# Patient Record
Sex: FEMALE | Race: WHITE | NOT HISPANIC OR LATINO | Employment: FULL TIME | URBAN - METROPOLITAN AREA
[De-identification: names, ages, dates, MRNs, and addresses within clinical notes are randomized per-mention and may not be internally consistent; named-entity substitution may affect disease eponyms.]

---

## 2017-02-13 ENCOUNTER — ALLSCRIPTS OFFICE VISIT (OUTPATIENT)
Dept: OTHER | Facility: OTHER | Age: 52
End: 2017-02-13

## 2017-02-13 ENCOUNTER — LAB REQUISITION (OUTPATIENT)
Dept: LAB | Facility: HOSPITAL | Age: 52
End: 2017-02-13
Payer: COMMERCIAL

## 2017-02-13 DIAGNOSIS — N63.0 BREAST LUMP: ICD-10-CM

## 2017-02-13 DIAGNOSIS — Z11.51 ENCOUNTER FOR SCREENING FOR HUMAN PAPILLOMAVIRUS (HPV): ICD-10-CM

## 2017-02-13 DIAGNOSIS — Z01.419 ENCOUNTER FOR GYNECOLOGICAL EXAMINATION WITHOUT ABNORMAL FINDING: ICD-10-CM

## 2017-02-13 PROCEDURE — 87624 HPV HI-RISK TYP POOLED RSLT: CPT | Performed by: NURSE PRACTITIONER

## 2017-02-13 PROCEDURE — G0145 SCR C/V CYTO,THINLAYER,RESCR: HCPCS | Performed by: NURSE PRACTITIONER

## 2017-02-17 LAB — HPV RRNA GENITAL QL NAA+PROBE: NORMAL

## 2017-02-20 LAB
LAB AP GYN PRIMARY INTERPRETATION: NORMAL
Lab: NORMAL

## 2017-02-22 ENCOUNTER — HOSPITAL ENCOUNTER (OUTPATIENT)
Dept: ULTRASOUND IMAGING | Facility: CLINIC | Age: 52
Discharge: HOME | End: 2017-02-22
Payer: COMMERCIAL

## 2017-02-22 ENCOUNTER — HOSPITAL ENCOUNTER (OUTPATIENT)
Dept: MAMMOGRAPHY | Facility: CLINIC | Age: 52
Discharge: HOME | End: 2017-02-22
Payer: COMMERCIAL

## 2017-02-22 DIAGNOSIS — N63.0 BREAST LUMP: ICD-10-CM

## 2017-02-22 PROCEDURE — G0204 DX MAMMO INCL CAD BI: HCPCS

## 2017-02-22 PROCEDURE — 76642 ULTRASOUND BREAST LIMITED: CPT

## 2017-02-24 ENCOUNTER — GENERIC CONVERSION - ENCOUNTER (OUTPATIENT)
Dept: OTHER | Facility: OTHER | Age: 52
End: 2017-02-24

## 2017-04-19 ENCOUNTER — TRANSCRIBE ORDERS (OUTPATIENT)
Dept: ADMINISTRATIVE | Age: 52
End: 2017-04-19

## 2017-04-19 ENCOUNTER — APPOINTMENT (OUTPATIENT)
Dept: LAB | Age: 52
End: 2017-04-19
Payer: COMMERCIAL

## 2017-04-19 DIAGNOSIS — D64.9 ANEMIA: ICD-10-CM

## 2017-04-19 DIAGNOSIS — E03.9 HYPOTHYROIDISM: ICD-10-CM

## 2017-04-19 LAB
BASOPHILS # BLD AUTO: 0.04 THOUSANDS/ΜL (ref 0–0.1)
BASOPHILS NFR BLD AUTO: 1 % (ref 0–1)
EOSINOPHIL # BLD AUTO: 0.24 THOUSAND/ΜL (ref 0–0.61)
EOSINOPHIL NFR BLD AUTO: 3 % (ref 0–6)
ERYTHROCYTE [DISTWIDTH] IN BLOOD BY AUTOMATED COUNT: 13 % (ref 11.6–15.1)
HCT VFR BLD AUTO: 38.4 % (ref 34.8–46.1)
HGB BLD-MCNC: 12.3 G/DL (ref 11.5–15.4)
IRON SATN MFR SERPL: 15 %
IRON SERPL-MCNC: 56 UG/DL (ref 50–170)
LYMPHOCYTES # BLD AUTO: 2.94 THOUSANDS/ΜL (ref 0.6–4.47)
LYMPHOCYTES NFR BLD AUTO: 36 % (ref 14–44)
MCH RBC QN AUTO: 28.9 PG (ref 26.8–34.3)
MCHC RBC AUTO-ENTMCNC: 32 G/DL (ref 31.4–37.4)
MCV RBC AUTO: 90 FL (ref 82–98)
MONOCYTES # BLD AUTO: 0.48 THOUSAND/ΜL (ref 0.17–1.22)
MONOCYTES NFR BLD AUTO: 6 % (ref 4–12)
NEUTROPHILS # BLD AUTO: 4.42 THOUSANDS/ΜL (ref 1.85–7.62)
NEUTS SEG NFR BLD AUTO: 54 % (ref 43–75)
NRBC BLD AUTO-RTO: 0 /100 WBCS
PLATELET # BLD AUTO: 377 THOUSANDS/UL (ref 149–390)
PMV BLD AUTO: 9.7 FL (ref 8.9–12.7)
RBC # BLD AUTO: 4.26 MILLION/UL (ref 3.81–5.12)
TIBC SERPL-MCNC: 378 UG/DL (ref 250–450)
TSH SERPL DL<=0.05 MIU/L-ACNC: 0.09 UIU/ML (ref 0.36–3.74)
WBC # BLD AUTO: 8.14 THOUSAND/UL (ref 4.31–10.16)

## 2017-04-19 PROCEDURE — 85025 COMPLETE CBC W/AUTO DIFF WBC: CPT

## 2017-04-19 PROCEDURE — 83540 ASSAY OF IRON: CPT

## 2017-04-19 PROCEDURE — 83550 IRON BINDING TEST: CPT

## 2017-04-19 PROCEDURE — 84443 ASSAY THYROID STIM HORMONE: CPT

## 2017-04-19 PROCEDURE — 36415 COLL VENOUS BLD VENIPUNCTURE: CPT

## 2017-06-15 ENCOUNTER — ALLSCRIPTS OFFICE VISIT (OUTPATIENT)
Dept: OTHER | Facility: OTHER | Age: 52
End: 2017-06-15

## 2017-06-21 DIAGNOSIS — E03.9 HYPOTHYROIDISM: ICD-10-CM

## 2017-06-30 ENCOUNTER — APPOINTMENT (OUTPATIENT)
Dept: LAB | Facility: CLINIC | Age: 52
End: 2017-06-30
Payer: COMMERCIAL

## 2017-06-30 DIAGNOSIS — E03.9 HYPOTHYROIDISM: ICD-10-CM

## 2017-06-30 LAB — TSH SERPL DL<=0.05 MIU/L-ACNC: 0.05 UIU/ML (ref 0.36–3.74)

## 2017-06-30 PROCEDURE — 36415 COLL VENOUS BLD VENIPUNCTURE: CPT

## 2017-06-30 PROCEDURE — 84443 ASSAY THYROID STIM HORMONE: CPT

## 2017-07-19 ENCOUNTER — HOSPITAL ENCOUNTER (OUTPATIENT)
Dept: RADIOLOGY | Facility: HOSPITAL | Age: 52
Discharge: HOME | End: 2017-07-19
Payer: COMMERCIAL

## 2017-07-19 DIAGNOSIS — E03.9 HYPOTHYROIDISM: ICD-10-CM

## 2017-07-19 PROCEDURE — 76536 US EXAM OF HEAD AND NECK: CPT

## 2017-07-27 ENCOUNTER — GENERIC CONVERSION - ENCOUNTER (OUTPATIENT)
Dept: OTHER | Facility: OTHER | Age: 52
End: 2017-07-27

## 2017-08-08 ENCOUNTER — ALLSCRIPTS OFFICE VISIT (OUTPATIENT)
Dept: OTHER | Facility: OTHER | Age: 52
End: 2017-08-08

## 2017-08-08 DIAGNOSIS — E53.8 DEFICIENCY OF OTHER SPECIFIED B GROUP VITAMINS: ICD-10-CM

## 2017-08-08 DIAGNOSIS — E03.9 HYPOTHYROIDISM: ICD-10-CM

## 2017-08-08 DIAGNOSIS — D64.9 ANEMIA: ICD-10-CM

## 2017-08-16 DIAGNOSIS — E03.9 HYPOTHYROIDISM: ICD-10-CM

## 2017-09-22 ENCOUNTER — APPOINTMENT (OUTPATIENT)
Dept: LAB | Facility: HOSPITAL | Age: 52
End: 2017-09-22
Payer: COMMERCIAL

## 2017-09-22 ENCOUNTER — ALLSCRIPTS OFFICE VISIT (OUTPATIENT)
Dept: OTHER | Facility: OTHER | Age: 52
End: 2017-09-22

## 2017-09-22 DIAGNOSIS — R39.9 UNSPECIFIED SYMPTOMS AND SIGNS INVOLVING THE GENITOURINARY SYSTEM: ICD-10-CM

## 2017-09-22 LAB
BILIRUB UR QL STRIP: NORMAL
CLARITY UR: NORMAL
COLOR UR: YELLOW
GLUCOSE (HISTORICAL): NORMAL
HGB UR QL STRIP.AUTO: NORMAL
KETONES UR STRIP-MCNC: NORMAL MG/DL
LEUKOCYTE ESTERASE UR QL STRIP: NORMAL
NITRITE UR QL STRIP: NORMAL
PH UR STRIP.AUTO: 0.5 [PH]
PROT UR STRIP-MCNC: NORMAL MG/DL
SP GR UR STRIP.AUTO: 1.02
UROBILINOGEN UR QL STRIP.AUTO: 0.2

## 2017-09-22 PROCEDURE — 87086 URINE CULTURE/COLONY COUNT: CPT

## 2017-09-23 LAB — BACTERIA UR CULT: NORMAL

## 2018-01-10 NOTE — MISCELLANEOUS
Provider Comments  Provider Comments:   Pt  was a no show for her Rheumatology evaluation today, 6/15/17        Signatures   Electronically signed by : Joe Silva DO; Jonah 15 2017  3:07PM EST                       (Author)

## 2018-01-13 VITALS
HEIGHT: 64 IN | BODY MASS INDEX: 31.07 KG/M2 | SYSTOLIC BLOOD PRESSURE: 120 MMHG | DIASTOLIC BLOOD PRESSURE: 74 MMHG | WEIGHT: 182 LBS

## 2018-01-13 VITALS
BODY MASS INDEX: 30.43 KG/M2 | TEMPERATURE: 97.4 F | RESPIRATION RATE: 18 BRPM | HEIGHT: 64 IN | WEIGHT: 178.25 LBS | SYSTOLIC BLOOD PRESSURE: 110 MMHG | HEART RATE: 78 BPM | DIASTOLIC BLOOD PRESSURE: 70 MMHG

## 2018-01-14 VITALS
SYSTOLIC BLOOD PRESSURE: 130 MMHG | TEMPERATURE: 96.4 F | WEIGHT: 177.5 LBS | HEIGHT: 64 IN | HEART RATE: 82 BPM | DIASTOLIC BLOOD PRESSURE: 84 MMHG | BODY MASS INDEX: 30.3 KG/M2

## 2018-01-15 NOTE — RESULT NOTES
Verified Results  [address] [place] [address] 02:24PM Sacha Moeller Order Number: [id number]    - Patient Instructions: To schedule this appointment, please contact Central Scheduling at [phone]  Test Name Result Flag Reference   US THYROID (Report)     THYROID ULTRASOUND     INDICATION: Fullness when swallowing, abnormal thyroid function tests     COMPARISON: None  TECHNIQUE:  Ultrasound of the thyroid was performed with a high frequency linear transducer in transverse and sagittal planes including volumetric imaging sweeps as well as traditional still imaging technique  FINDINGS:   The thyroid is atrophic and heterogeneous  Right gland: 0 9 x 0 7 x 2 4 cm  No dominant nodules  Left gland: 0 8 x 0 9 x 3 1 cm  No dominant nodules  Isthmus: The isthmus is 0 1 cm in AP dimension  IMPRESSION:      Atrophic and heterogeneous thyroid likely secondary to chronic autoimmune thyroiditis  Workstation performed: [id number]     Signed by:   Mookie Shelby MD   7/24/17       Signatures   Electronically signed by :  DELFINA Young ; Jul 27 2017  6:35PM EST                       (Author)

## 2018-01-15 NOTE — MISCELLANEOUS
Message   Recorded as Task   Date: 02/24/2017 01:12 PM, Created By: Jordy Torres   Task Name: Result Follow Up   Assigned To: Debby Dunn GYN,Team   Regarding Patient: Que Forrester, Status: In Progress   Zaranatalie Jeffers - 24 Feb 2017 1:12 PM     TASK CREATED  Benign findings on dx mammo and bilat breast US   Please notify patient and advise that she returns for screening mammo in 1 year  F/u PRN for changes noted on SBE  Please also advise that pap was WNL  Thanks   Paty Bañuelos - 24 Feb 2017 1:23 PM     TASK IN PROGRESS   Paty Bañuelos - 24 Feb 2017 1:23 PM     TASK EDITED  lm for pt  re olu and pap results        Active Problems    1  Anemia (285 9) (D64 9)   2  Arthritis (716 90) (M19 90)   3  Attention deficit hyperactivity disorder (314 01) (F90 9)   4  Atypical chest pain (786 59) (R07 89)   5  Cervical cancer screening (V76 2) (Z12 4)   6  Depression with anxiety (300 4) (F41 8)   7  Dysmenorrhea (625 3) (N94 6)   8  Eczema (692 9) (L30 9)   9  Elevated C-reactive protein (790 95) (R79 82)   10  Elevated WBC count (288 60) (D72 829)   11  Encounter for gynecological examination with abnormal finding (V72 31) (Z01 411)   12  Encounter for screening mammogram for malignant neoplasm of breast (V76 12)    (Z12 31)   13  Esophageal reflux (530 81) (K21 9)   14  Fasciitis (729 4) (M72 9)   15  Generalized anxiety disorder (300 02) (F41 1)   16  Herpes simplex infection (054 9) (B00 9)   17  Hypothyroidism (244 9) (E03 9)   18  Intramural leiomyoma of uterus (218 1) (D25 1)   19  Leiomyoma of uterus (218 9) (D25 9)   20  Lymphadenopathy (785 6) (R59 1)   21  Masses of both breasts (611 72) (N63)   22  Multiple joint pain (719 49) (M25 50)   23  Need for influenza vaccination (V04 81) (Z23)   24  Pain syndrome, chronic (338 4) (G89 4)   25  Psoriatic arthropathy (696 0) (L40 50)   26  Reactive airway disease (493 90) (J45 909)   27  Recurrent low back pain (724 2) (M54 5)   28   Screening for HPV (human papillomavirus) (V73 81) (Z11 51)   29  Skin rash (782 1) (R21)   30  Stress syndrome (308 0) (F43 9)   31  Vitamin B12 deficiency (266 2) (E53 8)    Current Meds   1  ALPRAZolam 0 5 MG Oral Tablet; TAKE 1 TABLET TWICE DAILY AS NEEDED FOR   ANXIETY; Therapy: 41Sgf3448 to (Evaluate:67Bqn5031)  Requested for: 01Apr2016; Last   Rx:01Apr2016 Ordered   2  Ferrex 150 Forte Plus  MG CAPS; TAKE 1 CAPSULE Daily; Therapy: 82QVW3803 to (Evaluate:28Mar2017)  Requested for: 68GUI3843; Last   Rx:28Nov2016 Ordered   3  Levocetirizine Dihydrochloride 5 MG Oral Tablet; TAKE 1 TABLET DAILY; Therapy: 82AJY6076 to (797 9386)  Requested for: 52DON9003; Last   Rx:01Nov2014 Ordered   4  Levothyroxine Sodium 150 MCG Oral Tablet; take one tablet by mouth every day; Therapy: 96PTZ8824 to (Evaluate:22Jan2017)  Requested for: 36Zvl3113; Last   Rx:31Mpi6088 Ordered   5  Metoprolol Tartrate 50 MG Oral Tablet; Take 1 tablet daily; Therapy: 02JUY5162 to (Evaluate:15Fpn7762)  Requested for: 36TXL0201; Last   Rx:18Jan2017 Ordered   6  Montelukast Sodium 10 MG Oral Tablet; TAKE 1 TABLET BY MOUTH EVERY AT   BEDTIME; Therapy: 34Sjj2459 to (Danica Robles)  Requested for: 10VYR8462; Last   Rx:78Yrn7612 Ordered   7  Nasonex 50 MCG/ACT Nasal Suspension (Mometasone Furoate); USE 2 SPRAYS IN   EACH NOSTRIL ONCE DAILY; Therapy: 22IAX5535 to (Evaluate:18Ctt9319)  Requested for: 96WIV1842; Last   Rx:10Rgw2797 Ordered   8  Pantoprazole Sodium 40 MG Oral Tablet Delayed Release; TAKE 1 TABLET DAILY 30   MINUTES BEFORE BREAKFAST; Therapy: 47RZA2088 to (Evaluate:73Ipj9455)  Requested for: 90XNV5859; Last   Rx:03Cfl9714 Ordered   9  PredniSONE 5 MG Oral Tablet; 4 tabs daily for 2 days, then  3  tabs daily for 2 days,   then 2 tabs daily for 2  days, 1 tab daily for 2 days; Therapy: 48FRR0474 to (Last Rx:28Nov2016)  Requested for: 25HSG0713; Status:   ACTIVE - Renewal Denied Ordered   10   Qnasl 80 MCG/ACT Nasal Aerosol Solution; INSTILL 2 SQUIRT Daily PRN; Therapy: 09IHB2806 to (Evaluate:07Kpz3197)  Requested for: 22OQT3054; Last    Rx:28Nov2016 Ordered   11  Symbicort 160-4 5 MCG/ACT Inhalation Aerosol; USE 2 PUFFS TWICE DAILY; Therapy: 76RPM2787 to (Teresa Dumont)  Requested for: 80JEL3054; Last    Rx:28Nov2016 Ordered   12  ValACYclovir HCl - 1 GM Oral Tablet; TAKE 2 TABLETS TWICE A DAY AS DIRECTED; Therapy: 00ICW8441 to (Evaluate:83Daa2254)  Requested for: 15Yoq0744; Last    Rx:09Aug2016 Ordered   13  Vyvanse 50 MG Oral Capsule; Take 1 capsule twice daily; Therapy: 87EJJ3647 to (Evaluate:53Maq1813); Last KI:58OLK5216 Ordered   14  Xopenex HFA 45 MCG/ACT Inhalation Aerosol; INHALE 2 PUFFS Every 4 hours PRN     chest tightness/wheeze; Therapy: 06EMZ6337 to (Evaluate:28Mar2017)  Requested for: 73VZO4633; Last    Rx:28Nov2016 Ordered    Allergies    1  Avelox TABS   2  Bactrim TABS   3   Cephalexin CAPS   4  Gabapentin CAPS    Signatures   Electronically signed by : Zaira Olivarez, ; Feb 24 2017  1:24PM EST                       (Author)

## 2018-01-15 NOTE — PROGRESS NOTES
Chief Complaint  Nurse visit for Vitamin B12 injection      Active Problems    1  Abdominal pain, LLQ (left lower quadrant) (789 04) (R10 32)   2  Acute sinusitis (461 9) (J01 90)   3  Allergic rhinitis (477 9) (J30 9)   4  Anemia (285 9) (D64 9)   5  Arthritis (716 90) (M19 90)   6  Attention deficit hyperactivity disorder (314 01) (F90 9)   7  Atypical chest pain (786 59) (R07 89)   8  Depression with anxiety (300 4) (F41 8)   9  Dysmenorrhea (625 3) (N94 6)   10  Eczema (692 9) (L30 9)   11  Elevated C-reactive protein (790 95) (R79 82)   12  Elevated WBC count (288 60) (D72 829)   13  Encounter for routine gynecological examination (V72 31) (Z01 419)   14  Encounter for screening mammogram for malignant neoplasm of breast (V76 12)    (Z12 31)   15  Esophageal reflux (530 81) (K21 9)   16  Fasciitis (729 4) (M72 9)   17  Generalized anxiety disorder (300 02) (F41 1)   18  Headache (784 0) (R51)   19  Herpes simplex infection (054 9) (B00 9)   20  Hot flashes, menopausal (627 2) (N95 1)   21  Hypothyroidism (244 9) (E03 9)   22  Intramural leiomyoma of uterus (218 1) (D25 1)   23  Leiomyoma of uterus (218 9) (D25 9)   24  Lymphadenopathy (785 6) (R59 1)   25  Mammogram abnormal (793 80) (R92 8)   26  Migraine headache (346 90) (G43 909)   27  Multiple joint pain (719 49) (M25 50)   28  Need for influenza vaccination (V04 81) (Z23)   29  Obstructive sleep apnea (327 23) (G47 33)   30  Pain syndrome, chronic (338 4) (G89 4)   31  Pelvic and perineal pain (625 9) (R10 2)   32  Psoriatic arthropathy (696 0) (L40 50)   33  Reactive airway disease (493 90) (J45 909)   34  Recurrent low back pain (724 2) (M54 5)   35  Skin rash (782 1) (R21)   36  Sore throat (462) (J02 9)   37  Stress syndrome (308 0) (F43 9)   38  Upper respiratory infection (465 9) (J06 9)   39  Urinary tract infection (599 0) (N39 0)   40  Vaginal yeast infection (112 1) (B37 3)   41  Vitamin B12 deficiency (266 2) (E53 8)    Current Meds   1  ALPRAZolam 0 5 MG Oral Tablet; TAKE 1 TABLET TWICE DAILY AS NEEDED FOR   ANXIETY; Therapy: 09Jhm5655 to (Evaluate:76Aws8548)  Requested for: 01Apr2016; Last   Rx:01Apr2016 Ordered   2  Ferrex 150 Forte Plus  MG CAPS; TAKE 1 CAPSULE Daily; Therapy: 98OTT5364 to (Evaluate:28Mar2017)  Requested for: 61GQL8584; Last   Rx:28Nov2016 Ordered   3  Levocetirizine Dihydrochloride 5 MG Oral Tablet; TAKE 1 TABLET DAILY; Therapy: 28DDD0056 to ([id number])  Requested for: 36ZOH8296; Last   Rx:01Nov2014 Ordered   4  Levothyroxine Sodium 150 MCG Oral Tablet; take one tablet by mouth every day; Therapy: 34JEX1941 to (Evaluate:09Jan2017)  Requested for: 95UJS4507; Last   Rx:10Nov2016 Ordered   5  Metoprolol Tartrate 50 MG Oral Tablet; Take 1 tablet daily; Therapy: 09CTR8607 to (Evaluate:15Jan2017)  Requested for: 04KJZ1799; Last   Rx:19Jlu9776 Ordered   6  Montelukast Sodium 10 MG Oral Tablet; TAKE 1 TABLET BY MOUTH EVERY AT   BEDTIME; Therapy: 78Iif3029 to (Geo De La Garza)  Requested for: 84EBV5822; Last   Rx:62Cgy1188 Ordered   7  Nasonex 50 MCG/ACT Nasal Suspension; USE 2 SPRAYS IN EACH NOSTRIL ONCE   DAILY; Therapy: 07DWV3743 to (Evaluate:46Avs0224)  Requested for: 46EQE3204; Last   Rx:62Ddc9285 Ordered   8  Pantoprazole Sodium 40 MG Oral Tablet Delayed Release; TAKE 1 TABLET 30   MINUTES BEFORE BREAKFAST DAILY; Therapy: 90USE3779 to (Evaluate:19Mar2017)  Requested for: 23MAD3849; Last   Rx:24Mar2016 Ordered   9  PredniSONE 5 MG Oral Tablet; 4 tabs daily for 2 days, then  3  tabs daily for 2 days,   then 2 tabs daily for 2  days, 1 tab daily for 2 days; Therapy: 29JLY6594 to (Last Rx:28Nov2016)  Requested for: 28Nov2016 Ordered   10  Qnasl 80 MCG/ACT Nasal Aerosol Solution; INSTILL 2 SQUIRT Daily PRN; Therapy: 00FXZ4234 to (Evaluate:58Dhr3795)  Requested for: 62LCL5456; Last    Rx:28Nov2016 Ordered   11  Symbicort 160-4 5 MCG/ACT Inhalation Aerosol; USE 2 PUFFS TWICE DAILY;     Therapy: 19HLO0925 to (Sherman Onslow)  Requested for: 84XEV3440; Last    Rx:28Nov2016 Ordered   12  ValACYclovir HCl - 1 GM Oral Tablet; TAKE 2 TABLETS TWICE A DAY AS DIRECTED; Therapy: 92KZZ8091 to (Evaluate:21Lvq0662)  Requested for: 07Spg2618; Last    Rx:81Dca1640 Ordered   13  Vyvanse 50 MG Oral Capsule; Take 1 capsule twice daily; Therapy: 97IPD6812 to (Evaluate:68Rfe9690); Last RAMIREZ:41KMN8247 Ordered   14  Xopenex HFA 45 MCG/ACT Inhalation Aerosol; INHALE 2 PUFFS Every 4 hours PRN     chest tightness/wheeze; Therapy: 10NSX7151 to (Evaluate:28Mar2017)  Requested for: 62PNH5294; Last    Rx:28Nov2016 Ordered    Allergies    1  Avelox TABS   2  Bactrim TABS   3  Cephalexin CAPS   4  Gabapentin CAPS    Plan  Vitamin B12 deficiency    · Cyanocobalamin 1000 MCG/ML Injection Solution    Future Appointments    Date/Time Provider Specialty Site   02/01/2017 09:00 AM Luis Alberto Moore DO Gastroenterology Adult [facility]   02/27/2017 02:20 PM Juliette Messer DO Rheumatology [facility]   12/14/2016 10:00 AM Lisset Martinez MD Pain Management [facility] SPINE   12/27/2016 01:15 PM [address], Nurse Schedule  [facility]     Signatures   Electronically signed by :  DELFINA Peters ; Dec 12 2016  9:59PM EST                       (Author)

## 2018-02-03 DIAGNOSIS — E03.9 HYPOTHYROIDISM, UNSPECIFIED TYPE: Primary | ICD-10-CM

## 2018-02-05 RX ORDER — LEVOTHYROXINE SODIUM 0.1 MG/1
TABLET ORAL
Qty: 90 TABLET | Refills: 0 | Status: SHIPPED | OUTPATIENT
Start: 2018-02-05 | End: 2018-05-06 | Stop reason: SDUPTHER

## 2018-05-06 DIAGNOSIS — E03.9 HYPOTHYROIDISM, UNSPECIFIED TYPE: ICD-10-CM

## 2018-05-07 ENCOUNTER — HOSPITAL ENCOUNTER (OUTPATIENT)
Dept: RADIOLOGY | Facility: HOSPITAL | Age: 53
Discharge: HOME | End: 2018-05-07
Payer: COMMERCIAL

## 2018-05-07 DIAGNOSIS — R07.1 CHEST PAIN ON RESPIRATION: ICD-10-CM

## 2018-05-07 DIAGNOSIS — S22.31XA CLOSED FRACTURE OF RIB OF RIGHT SIDE: ICD-10-CM

## 2018-05-07 PROCEDURE — 71101 X-RAY EXAM UNILAT RIBS/CHEST: CPT

## 2018-05-07 PROCEDURE — 71046 X-RAY EXAM CHEST 2 VIEWS: CPT

## 2018-05-08 RX ORDER — LEVOTHYROXINE SODIUM 0.1 MG/1
TABLET ORAL
Qty: 5 TABLET | Refills: 0 | Status: SHIPPED | OUTPATIENT
Start: 2018-05-08 | End: 2018-07-02 | Stop reason: SDUPTHER

## 2018-05-08 NOTE — TELEPHONE ENCOUNTER
Please contact patient  I received a refill request for Synthroid 100 micrograms 90 tablets  Patient's last thyroid test was done in June, it was abnormal, she was supposed to repeated and I do not see any follow-up results of blood work  I did placed orders for her repeat TSH back almost a year ago in June of 2017    I approved refill for 5 tablets only  Patient should proceed with blood work ASAP and schedule follow-up    Thanks

## 2018-05-09 ENCOUNTER — OFFICE VISIT (OUTPATIENT)
Dept: FAMILY MEDICINE CLINIC | Facility: CLINIC | Age: 53
End: 2018-05-09
Payer: COMMERCIAL

## 2018-05-09 VITALS
HEART RATE: 84 BPM | RESPIRATION RATE: 16 BRPM | HEIGHT: 64 IN | TEMPERATURE: 97.2 F | WEIGHT: 181.8 LBS | DIASTOLIC BLOOD PRESSURE: 82 MMHG | SYSTOLIC BLOOD PRESSURE: 116 MMHG | BODY MASS INDEX: 31.04 KG/M2

## 2018-05-09 DIAGNOSIS — R07.89 RIGHT-SIDED CHEST WALL PAIN: ICD-10-CM

## 2018-05-09 DIAGNOSIS — J40 BRONCHITIS: Primary | ICD-10-CM

## 2018-05-09 PROCEDURE — 99213 OFFICE O/P EST LOW 20 MIN: CPT | Performed by: NURSE PRACTITIONER

## 2018-05-09 RX ORDER — VALACYCLOVIR HYDROCHLORIDE 1 G/1
TABLET, FILM COATED ORAL
COMMUNITY
Start: 2011-09-21

## 2018-05-09 RX ORDER — PANTOPRAZOLE SODIUM 40 MG/1
1 TABLET, DELAYED RELEASE ORAL DAILY
COMMUNITY
Start: 2014-01-15 | End: 2018-10-09 | Stop reason: SDUPTHER

## 2018-05-09 RX ORDER — METOPROLOL TARTRATE 50 MG/1
1 TABLET, FILM COATED ORAL DAILY
COMMUNITY
Start: 2013-06-04 | End: 2018-06-14 | Stop reason: SDUPTHER

## 2018-05-09 RX ORDER — FAMOTIDINE 40 MG/1
1 TABLET, FILM COATED ORAL
COMMUNITY
Start: 2017-08-08 | End: 2018-05-19 | Stop reason: SDUPTHER

## 2018-05-09 RX ORDER — BUPROPION HYDROCHLORIDE 300 MG/1
1 TABLET ORAL DAILY
COMMUNITY

## 2018-05-09 RX ORDER — MOMETASONE FUROATE 50 UG/1
2 SPRAY, METERED NASAL DAILY
COMMUNITY
Start: 2012-11-05 | End: 2020-03-09 | Stop reason: SDUPTHER

## 2018-05-09 RX ORDER — GABAPENTIN 100 MG/1
300 CAPSULE ORAL 2 TIMES DAILY PRN
COMMUNITY

## 2018-05-09 RX ORDER — LEVALBUTEROL TARTRATE 45 UG/1
AEROSOL, METERED ORAL EVERY 4 HOURS
COMMUNITY
Start: 2013-02-18 | End: 2020-03-09 | Stop reason: SDUPTHER

## 2018-05-09 RX ORDER — LEVOFLOXACIN 750 MG/1
750 TABLET ORAL EVERY 24 HOURS
Qty: 5 TABLET | Refills: 0 | Status: SHIPPED | OUTPATIENT
Start: 2018-05-09 | End: 2018-05-14

## 2018-05-09 RX ORDER — ESCITALOPRAM OXALATE 20 MG/1
1 TABLET ORAL DAILY
COMMUNITY

## 2018-05-09 NOTE — PROGRESS NOTES
FAMILY PRACTICE OFFICE VISIT       NAME: Aneesh Burns  AGE: 47 y o  SEX: female       : 1965        MRN: [id number]    DATE: 2018  TIME: 8:01 PM    Assessment and Plan     Problem List Items Addressed This Visit     None      Visit Diagnoses     Bronchitis    -  Primary    Relevant Medications    levalbuterol (XOPENEX HFA) 45 mcg/act inhaler    mometasone (NASONEX) 50 mcg/act nasal spray    levofloxacin (LEVAQUIN) 750 mg tablet    Right-sided chest wall pain              1  Bronchitis  levofloxacin (LEVAQUIN) 750 mg tablet   2  Right-sided chest wall pain       Patient presents today with symptoms consistent with bronchitis  Symptoms have been present for the past 3 weeks  She has completed a medrol dose pack 4-5 days ago, with no improvement in symptoms  Has developed right sided chest pain with cough & deep breathing  Chest x-ray and right rib x-rays completed on , with no acute findings  Suspect muscle strain or costochondritis  Continue to use heat, and Tylenol for pain  Declines cough medication at this time  Given length of symptoms will treat with a course of levofloxacin 750 mg daily for 5 days  She has taken this in the past and is aware of the side effect profile of this medication  Continue supportive care:  Rest, increase fluids, warm fluids, honey, and humidification  If symptoms worsen, or if symptoms do not improve over the next few days, instructed to call the office  Chief Complaint     Chief Complaint   Patient presents with    Cold Like Symptoms     Patient is here c/o chest discomfort, ear pain, productive cough and fatigue x's 3 wks  History of Present Illness     Patient presents today for follow up after recent visits at [facility]  Approximately, 3 weeks ago she began with cough, fatigue, and rhinorrhea  Chest feels congested, like she needs to expectorate sputum, but is unable too   She was seen in urgent care approximately 1 5 weeks ago and prescribed a medrol dose pack, which she completed as prescribed  Had no improvement in symptoms  2 days ago she went back to urgent care for persistent symptoms and right sided sharp anterior chest pain with deep breathing and coughing  She had x-rays completed which were normal      Pain, cough, rhinorrhea persist  She has chills, no fevers  +ear pain bilaterally  +hoarse voice  She feels symptoms are worsening  Has a xopenx inhaler, which she has been using 3-4 times per day for chest tightness  Taking Tylenol for pain during the day, and states oxycodone was prescribed at urgent care for bedtime  Significant history of gastric bypass surgery, therefore it is necessary to avoid NSAIDS  Review of Systems   Review of Systems   Constitutional: Positive for chills and fatigue  Negative for fever  HENT: Positive for ear pain, postnasal drip, rhinorrhea and voice change  Negative for congestion, sinus pressure and sore throat  Respiratory: Positive for cough, chest tightness, shortness of breath (at times) and wheezing (at times)  Cardiovascular: Positive for chest pain  Negative for palpitations and leg swelling  Gastrointestinal: Negative for diarrhea, nausea and vomiting  Skin: Negative for rash  Neurological: Negative for dizziness and headaches  Active Problem List   There is no problem list on file for this patient        Past Medical History:  Past Medical History:   Diagnosis Date    Anxiety     Arthritis     Disease of thyroid gland     HPV in female        Past Surgical History:  Past Surgical History:   Procedure Laterality Date     SECTION      GASTRIC BYPASS      HIP SURGERY      MOUTH SURGERY      SINUS SURGERY         Family History:  Family History   Problem Relation Age of Onset    Dementia Mother     Osteopenia Mother     Leukemia Father        Social History:  Social History     Social History    Marital status: /Civil Union Spouse name: N/A    Number of children: N/A    Years of education: N/A     Occupational History    Not on file  Social History Main Topics    Smoking status: Former Smoker    Smokeless tobacco: Never Used    Alcohol use No    Drug use: No    Sexual activity: Not on file     Other Topics Concern    Not on file     Social History Narrative        2 children    Working currently       I have reviewed the patient's medical history in detail; there are no changes to the history as noted in the electronic medical record  Objective     Vitals:    05/09/18 1208   BP: 116/82   Pulse: 84   Resp: 16   Temp: (!) 97 2 °F (36 2 °C)   TempSrc: Tympanic   Weight: 82 5 kg (181 lb 12 8 oz)   Height: 5' 3 5" (1 613 m)     Wt Readings from Last 3 Encounters:   05/09/18 82 5 kg (181 lb 12 8 oz)   09/22/17 80 9 kg (178 lb 4 oz)   08/08/17 80 5 kg (177 lb 8 oz)     Body mass index is 31 7 kg/m²  Physical Exam   Constitutional: She is oriented to person, place, and time  She appears well-developed and well-nourished  Appears tired   HENT:   Head: Normocephalic and atraumatic  Right Ear: Tympanic membrane and ear canal normal    Left Ear: Tympanic membrane and ear canal normal    Nose: Mucosal edema present  Mouth/Throat: Posterior oropharyngeal erythema (mild erythema and post nasal drip) present  No oropharyngeal exudate  Eyes: Conjunctivae are normal    Neck: Normal range of motion  Neck supple  Cardiovascular: Normal rate, regular rhythm and normal heart sounds  No murmur heard  Pulmonary/Chest: Effort normal and breath sounds normal    Moist nonproductive cough   Musculoskeletal: She exhibits no edema  Lymphadenopathy:     She has no cervical adenopathy  Neurological: She is alert and oriented to person, place, and time  Skin: No rash noted  Psychiatric: She has a normal mood and affect  Nursing note and vitals reviewed        ALLERGIES:  Allergies   Allergen Reactions    Gabapentin Other reaction(s): Unknown Allergic Reaction    Cephalexin Rash     Reaction Date: 28Jul2011;     Moxifloxacin Rash     Reaction Date: 18Apr2011;     Sulfamethoxazole-Trimethoprim Rash       Current Medications     Current Outpatient Prescriptions   Medication Sig Dispense Refill    buPROPion (WELLBUTRIN XL) 300 mg 24 hr tablet Take 1 tablet by mouth daily      escitalopram (LEXAPRO) 20 mg tablet Take 1 tablet by mouth daily      famotidine (PEPCID) 40 MG tablet Take 1 tablet by mouth      gabapentin (NEURONTIN) 100 mg capsule Take by mouth      levalbuterol (XOPENEX HFA) 45 mcg/act inhaler Inhale every 4 (four) hours      levothyroxine 100 mcg tablet TAKE 1 TABLET BY MOUTH EVERY DAY 5 tablet 0    lisdexamfetamine (VYVANSE) 40 MG capsule Take by mouth 2 (two) times a day      metoprolol tartrate (LOPRESSOR) 50 mg tablet Take 1 tablet by mouth daily      mometasone (NASONEX) 50 mcg/act nasal spray 2 sprays into each nostril daily      pantoprazole (PROTONIX) 40 mg tablet Take 1 tablet by mouth Daily      valACYclovir (VALTREX) 1,000 mg tablet Take by mouth      levofloxacin (LEVAQUIN) 750 mg tablet Take 1 tablet (750 mg total) by mouth every 24 hours for 5 days 5 tablet 0     No current facility-administered medications for this visit            Health Maintenance     Health Maintenance   Topic Date Due    HIV SCREENING  1965    COLONOSCOPY  1965    Depression Screening PHQ-9  01/19/1978    DTaP,Tdap,and Td Vaccines (1 - Tdap) 01/19/1987    INFLUENZA VACCINE  09/01/2018     Immunization History   Administered Date(s) Administered    Influenza Quadrivalent Preservative Free 3 years and older IM 11/28/2016    Influenza TIV (IM) 10/14/2009, 09/16/2011, 01/10/2013    Pneumococcal Polysaccharide PPV23 02/01/2013    Tuberculin Skin Test-PPD Intradermal 12/13/2011       KALEB Scott

## 2018-05-15 ENCOUNTER — TELEPHONE (OUTPATIENT)
Dept: FAMILY MEDICINE CLINIC | Facility: CLINIC | Age: 53
End: 2018-05-15

## 2018-05-15 DIAGNOSIS — Z00.00 HEALTHCARE MAINTENANCE: Primary | ICD-10-CM

## 2018-05-15 RX ORDER — TERCONAZOLE 80 MG/1
80 SUPPOSITORY VAGINAL
Qty: 3 SUPPOSITORY | Refills: 0 | Status: SHIPPED | OUTPATIENT
Start: 2018-05-15 | End: 2018-09-24

## 2018-05-15 NOTE — TELEPHONE ENCOUNTER
OBVIOUSLY, SHE SHOULD NOT BE USING  VAGINAL SUPPOSITORY IF SHE HAS SYMPTOMS OF ORAL THRUSH  I recommend that  PATIENT SHOULD BE EVALUATED IN THE OFFICE ARE NOT QUITE SURE WHAT I AM TREATING OVER THE PHONE      THANKS

## 2018-05-15 NOTE — TELEPHONE ENCOUNTER
Spoke w/ pt's  and he states that the yeast infection is in pt's mouth and she has completed the Levaquin  Should she still use vaginal suppositories ? Please advise   Thanks

## 2018-05-15 NOTE — TELEPHONE ENCOUNTER
Diflucan and Levaquin should not be combined together  I will send prescription for Terazol vaginal suppositories      Thank you

## 2018-05-15 NOTE — TELEPHONE ENCOUNTER
RE: Levaquin    It has given her a yeast infection  Can you call in Diflucan for her into [facility]?

## 2018-05-19 DIAGNOSIS — K21.9 CHRONIC GERD: Primary | ICD-10-CM

## 2018-05-22 RX ORDER — FAMOTIDINE 40 MG/1
TABLET, FILM COATED ORAL
Qty: 30 TABLET | Refills: 0 | Status: SHIPPED | OUTPATIENT
Start: 2018-05-22 | End: 2018-06-18 | Stop reason: SDUPTHER

## 2018-06-14 DIAGNOSIS — F41.1 GENERALIZED ANXIETY DISORDER: Primary | ICD-10-CM

## 2018-06-14 RX ORDER — METOPROLOL TARTRATE 50 MG/1
TABLET, FILM COATED ORAL DAILY
Qty: 90 TABLET | Refills: 1 | Status: SHIPPED | OUTPATIENT
Start: 2018-06-14 | End: 2018-12-08 | Stop reason: SDUPTHER

## 2018-06-18 DIAGNOSIS — K21.9 CHRONIC GERD: ICD-10-CM

## 2018-06-18 RX ORDER — FAMOTIDINE 40 MG/1
TABLET, FILM COATED ORAL
Qty: 30 TABLET | Refills: 0 | Status: SHIPPED | OUTPATIENT
Start: 2018-06-18 | End: 2018-07-23 | Stop reason: SDUPTHER

## 2018-06-26 ENCOUNTER — OFFICE VISIT (OUTPATIENT)
Dept: URGENT CARE | Age: 53
End: 2018-06-26
Payer: COMMERCIAL

## 2018-06-26 VITALS
HEART RATE: 63 BPM | DIASTOLIC BLOOD PRESSURE: 76 MMHG | SYSTOLIC BLOOD PRESSURE: 139 MMHG | OXYGEN SATURATION: 99 % | BODY MASS INDEX: 30.22 KG/M2 | WEIGHT: 177 LBS | TEMPERATURE: 97.1 F | RESPIRATION RATE: 20 BRPM | HEIGHT: 64 IN

## 2018-06-26 DIAGNOSIS — M65.4 RADIAL STYLOID TENOSYNOVITIS (DE QUERVAIN): Primary | ICD-10-CM

## 2018-06-26 PROBLEM — N63.20 MASSES OF BOTH BREASTS: Status: ACTIVE | Noted: 2017-02-13

## 2018-06-26 PROBLEM — N63.10 MASSES OF BOTH BREASTS: Status: ACTIVE | Noted: 2017-02-13

## 2018-06-26 PROCEDURE — 99214 OFFICE O/P EST MOD 30 MIN: CPT | Performed by: FAMILY MEDICINE

## 2018-06-27 NOTE — PATIENT INSTRUCTIONS
RICE (rest, ice, compression, elevation) measures as discussed  Rest and elevate injured area as much as possible  Ice for 20 minutes at a time 3-4 times per day  Wear compression device/wrist splint during all waking hours  You may wear the wrist splint at night, however, check blood flow prior to sleep  Tylenol for discomfort  Follow up with your family doctor in the next 3-5 days  Proceed to the ER if symptoms worsen

## 2018-06-27 NOTE — PROGRESS NOTES
[facility]        NAME: Lindajo Sever is a 47 y o  female  : 1965    MRN: [id number]  DATE: 2018  TIME: 8:50 AM    Assessment and Plan   Radial styloid tenosynovitis (de quervain) [M65 4]  1  Radial styloid tenosynovitis (de quervain)  XR wrist 3+ vw left    XR hand 3+ vw left     Patient Instructions   RICE (rest, ice, compression, elevation) measures as discussed  Rest and elevate injured area as much as possible  Ice for 20 minutes at a time 3-4 times per day  Wear compression device/wrist splint during all waking hours  You may wear the wrist splint at night, however, check blood flow prior to sleep  Tylenol for discomfort  Follow up with your family doctor in the next 3-5 days  Proceed to the ER if symptoms worsen  Chief Complaint     Chief Complaint   Patient presents with    Wrist Pain     left wrist pain and hand for 2 month   ( no injury)    Hand Pain         History of Present Illness       64-year-old female with complaint of left wrist pain x2 months  She denies injury or any inciting event  Pain is reported as a sharp stabbing located at the base of her metatarsal (demonstrated) and the radial aspect of the wrist (demonstrated)  Pain is worse with movement especially gripping and extension of the fingers and is typically worse at the end of the day  She has tried treating with a wrist brace, which is primarily relieving of the discomfort, however, she states she does not want to wear this continously  She is also using ibuprofen which is moderately relieving of discomfort  She denies any swelling, skin discoloration, or visible deformity  No previous injury  RHD  Review of Systems   Review of Systems   Constitutional: Negative for chills and fever  Musculoskeletal: Positive for arthralgias  Negative for joint swelling and myalgias  Skin: Negative for rash and wound  Neurological: Negative for tremors, weakness and numbness       Current Medications       Current Outpatient Prescriptions:     buPROPion (WELLBUTRIN XL) 300 mg 24 hr tablet, Take 1 tablet by mouth daily, Disp: , Rfl:     escitalopram (LEXAPRO) 20 mg tablet, Take 1 tablet by mouth daily, Disp: , Rfl:     famotidine (PEPCID) 40 MG tablet, TAKE 1 TABLET BY MOUTH EVERYDAY AT BEDTIME, Disp: 30 tablet, Rfl: 0    gabapentin (NEURONTIN) 100 mg capsule, Take by mouth, Disp: , Rfl:     levalbuterol (XOPENEX HFA) 45 mcg/act inhaler, Inhale every 4 (four) hours, Disp: , Rfl:     levothyroxine 100 mcg tablet, TAKE 1 TABLET BY MOUTH EVERY DAY, Disp: 5 tablet, Rfl: 0    lisdexamfetamine (VYVANSE) 40 MG capsule, Take by mouth 2 (two) times a day, Disp: , Rfl:     metoprolol tartrate (LOPRESSOR) 50 mg tablet, TAKE 1 TABLET BY MOUTH DAILY, Disp: 90 tablet, Rfl: 1    mometasone (NASONEX) 50 mcg/act nasal spray, 2 sprays into each nostril daily, Disp: , Rfl:     pantoprazole (PROTONIX) 40 mg tablet, Take 1 tablet by mouth Daily, Disp: , Rfl:     terconazole (TERAZOL 3) 80 MG vaginal suppository, Insert 1 suppository (80 mg total) into the vagina daily at bedtime, Disp: 3 suppository, Rfl: 0    valACYclovir (VALTREX) 1,000 mg tablet, Take by mouth, Disp: , Rfl:     Current Allergies     Allergies as of 2018 - Reviewed 2018   Allergen Reaction Noted    Gabapentin  2012    Cephalexin Rash 2012    Moxifloxacin Rash 2012    Sulfamethoxazole-trimethoprim Rash 2012            The following portions of the patient's history were reviewed and updated as appropriate: allergies, current medications, past family history, past medical history, past social history, past surgical history and problem list      Past Medical History:   Diagnosis Date    Anxiety     Disorder, per [employer]    Arthritis     Disease of thyroid gland     HPV in female     Osteoarthritis        Past Surgical History:   Procedure Laterality Date     SECTION      GASTRIC BYPASS For Morbid Obesity, per [employer]    HIP SURGERY      MOUTH SURGERY      SINUS SURGERY         Family History   Problem Relation Age of Onset    Dementia Mother     Osteopenia Mother     Leukemia Father      Medications have been verified  Objective   /76   Pulse 63   Temp (!) 97 1 °F (36 2 °C) (Temporal)   Resp 20   Ht 5' 4" (1 626 m)   Wt 80 3 kg (177 lb)   SpO2 99%   BMI 30 38 kg/m²      Hand/wrist XR: no acute osseous abn  Physical Exam     Physical Exam   Constitutional: She is oriented to person, place, and time  She appears well-developed and well-nourished  No distress  HENT:   Head: Normocephalic and atraumatic  Eyes: Conjunctivae are normal    Cardiovascular: Normal rate, regular rhythm and normal heart sounds  Pulmonary/Chest: Effort normal and breath sounds normal  No respiratory distress  She has no decreased breath sounds  She has no wheezes  She has no rhonchi  She has no rales  Musculoskeletal:        Left wrist: She exhibits tenderness (TTP over radial styloid and head of proximal 5th metatarsal )  She exhibits normal range of motion, no swelling, no effusion, no crepitus, no deformity and no laceration  Positive Finkelstein test  Negative tinel's and Phalen  Radial and ulnar pulses +2  Distal sensation intact   strength 5/5 b/l  Brachioradialis reflex +2, symmetrical     Neurological: She is alert and oriented to person, place, and time  No cranial nerve deficit  She exhibits normal muscle tone  Coordination normal    Skin: Skin is warm and dry  No rash noted  She is not diaphoretic  Psychiatric: She has a normal mood and affect  Her behavior is normal    Nursing note and vitals reviewed

## 2018-07-02 ENCOUNTER — APPOINTMENT (OUTPATIENT)
Dept: LAB | Facility: CLINIC | Age: 53
End: 2018-07-02
Payer: COMMERCIAL

## 2018-07-02 ENCOUNTER — TELEPHONE (OUTPATIENT)
Dept: FAMILY MEDICINE CLINIC | Facility: CLINIC | Age: 53
End: 2018-07-02

## 2018-07-02 DIAGNOSIS — E03.9 HYPOTHYROIDISM: ICD-10-CM

## 2018-07-02 DIAGNOSIS — E03.9 HYPOTHYROIDISM, UNSPECIFIED TYPE: ICD-10-CM

## 2018-07-02 LAB — TSH SERPL DL<=0.05 MIU/L-ACNC: 43.3 UIU/ML (ref 0.36–3.74)

## 2018-07-02 PROCEDURE — 36415 COLL VENOUS BLD VENIPUNCTURE: CPT

## 2018-07-02 PROCEDURE — 84443 ASSAY THYROID STIM HORMONE: CPT

## 2018-07-02 RX ORDER — LEVOTHYROXINE SODIUM 0.1 MG/1
100 TABLET ORAL DAILY
Qty: 30 TABLET | Refills: 1 | Status: SHIPPED | OUTPATIENT
Start: 2018-07-02 | End: 2018-09-13 | Stop reason: SDUPTHER

## 2018-07-02 NOTE — TELEPHONE ENCOUNTER
Please advise patient to restart Synthroid 100 micrograms daily  I will send prescription  She needs to make an office visit    Thank you

## 2018-07-02 NOTE — TELEPHONE ENCOUNTER
----- Message from Charley Morgan MD sent at 7/2/2018  4:44 PM EDT -----  Please contact patient  Her thyroid function is very under active  Please find out current dose of Synthroid  I do need to see this patient for a follow-up office visit    Please let me know, thank you

## 2018-07-02 NOTE — TELEPHONE ENCOUNTER
Patient is aware of MD instructions  Patient states that she is currently taking 100mcg daily of the Levothyroxine  Patient is currently out of her medication  Please advise

## 2018-07-23 DIAGNOSIS — K21.9 CHRONIC GERD: ICD-10-CM

## 2018-07-25 RX ORDER — FAMOTIDINE 40 MG/1
TABLET, FILM COATED ORAL
Qty: 30 TABLET | Refills: 0 | Status: SHIPPED | OUTPATIENT
Start: 2018-07-25 | End: 2018-09-13 | Stop reason: SDUPTHER

## 2018-08-17 DIAGNOSIS — K21.9 CHRONIC GERD: ICD-10-CM

## 2018-08-17 RX ORDER — FAMOTIDINE 40 MG/1
40 TABLET, FILM COATED ORAL
Qty: 90 TABLET | Refills: 0 | OUTPATIENT
Start: 2018-08-17

## 2018-08-24 DIAGNOSIS — E03.9 HYPOTHYROIDISM, UNSPECIFIED TYPE: ICD-10-CM

## 2018-08-28 RX ORDER — LEVOTHYROXINE SODIUM 0.1 MG/1
TABLET ORAL
Qty: 30 TABLET | Refills: 1 | OUTPATIENT
Start: 2018-08-28

## 2018-09-06 DIAGNOSIS — K21.9 CHRONIC GERD: ICD-10-CM

## 2018-09-06 RX ORDER — FAMOTIDINE 40 MG/1
TABLET, FILM COATED ORAL
Qty: 30 TABLET | Refills: 0 | OUTPATIENT
Start: 2018-09-06

## 2018-09-13 ENCOUNTER — TELEPHONE (OUTPATIENT)
Dept: FAMILY MEDICINE CLINIC | Facility: CLINIC | Age: 53
End: 2018-09-13

## 2018-09-13 DIAGNOSIS — N94.6 DYSMENORRHEA: ICD-10-CM

## 2018-09-13 DIAGNOSIS — K21.9 CHRONIC GERD: ICD-10-CM

## 2018-09-13 DIAGNOSIS — E03.9 HYPOTHYROIDISM, UNSPECIFIED TYPE: Primary | ICD-10-CM

## 2018-09-13 DIAGNOSIS — D64.9 ANEMIA, UNSPECIFIED TYPE: ICD-10-CM

## 2018-09-13 RX ORDER — LEVOTHYROXINE SODIUM 0.1 MG/1
100 TABLET ORAL DAILY
Qty: 30 TABLET | Refills: 0 | Status: SHIPPED | OUTPATIENT
Start: 2018-09-13 | End: 2018-10-10 | Stop reason: SDUPTHER

## 2018-09-13 RX ORDER — FAMOTIDINE 40 MG/1
40 TABLET, FILM COATED ORAL
Qty: 30 TABLET | Refills: 0 | Status: SHIPPED | OUTPATIENT
Start: 2018-09-13 | End: 2018-12-09 | Stop reason: SDUPTHER

## 2018-09-13 NOTE — TELEPHONE ENCOUNTER
Spoke w/ pt and scheduled appt for 9/24/2018 for follow up  Pt is requesting a refill on her levothyroxine which she has been out of for 4 days and famotidine sent to [facility]  Please advise   Thanks

## 2018-09-13 NOTE — TELEPHONE ENCOUNTER
I will send prescriptions  Patient should do blood work prior to her appointment    Orders are in place, thanks

## 2018-09-24 ENCOUNTER — OFFICE VISIT (OUTPATIENT)
Dept: FAMILY MEDICINE CLINIC | Facility: CLINIC | Age: 53
End: 2018-09-24
Payer: COMMERCIAL

## 2018-09-24 VITALS
DIASTOLIC BLOOD PRESSURE: 82 MMHG | RESPIRATION RATE: 16 BRPM | HEART RATE: 72 BPM | HEIGHT: 64 IN | TEMPERATURE: 95.3 F | SYSTOLIC BLOOD PRESSURE: 124 MMHG | BODY MASS INDEX: 30.35 KG/M2 | WEIGHT: 177.8 LBS

## 2018-09-24 DIAGNOSIS — Z90.3 POSTGASTRECTOMY MALABSORPTION: Primary | ICD-10-CM

## 2018-09-24 DIAGNOSIS — J45.40 MODERATE PERSISTENT REACTIVE AIRWAY DISEASE WITHOUT COMPLICATION: ICD-10-CM

## 2018-09-24 DIAGNOSIS — K21.9 CHRONIC GERD: ICD-10-CM

## 2018-09-24 DIAGNOSIS — I10 ESSENTIAL HYPERTENSION: ICD-10-CM

## 2018-09-24 DIAGNOSIS — K91.2 POSTGASTRECTOMY MALABSORPTION: Primary | ICD-10-CM

## 2018-09-24 DIAGNOSIS — Z12.39 SCREENING FOR BREAST CANCER: ICD-10-CM

## 2018-09-24 DIAGNOSIS — M65.4 DE QUERVAIN'S DISEASE (RADIAL STYLOID TENOSYNOVITIS): ICD-10-CM

## 2018-09-24 DIAGNOSIS — Z12.11 SCREENING FOR COLON CANCER: ICD-10-CM

## 2018-09-24 DIAGNOSIS — F41.8 DEPRESSION WITH ANXIETY: ICD-10-CM

## 2018-09-24 DIAGNOSIS — E03.9 HYPOTHYROIDISM, UNSPECIFIED TYPE: ICD-10-CM

## 2018-09-24 DIAGNOSIS — E53.8 VITAMIN B12 DEFICIENCY: ICD-10-CM

## 2018-09-24 DIAGNOSIS — L40.50 PSORIASIS WITH ARTHROPATHY (HCC): ICD-10-CM

## 2018-09-24 PROCEDURE — 3074F SYST BP LT 130 MM HG: CPT | Performed by: FAMILY MEDICINE

## 2018-09-24 PROCEDURE — 3079F DIAST BP 80-89 MM HG: CPT | Performed by: FAMILY MEDICINE

## 2018-09-24 PROCEDURE — 99215 OFFICE O/P EST HI 40 MIN: CPT | Performed by: FAMILY MEDICINE

## 2018-09-24 PROCEDURE — 1036F TOBACCO NON-USER: CPT | Performed by: FAMILY MEDICINE

## 2018-09-24 PROCEDURE — 3008F BODY MASS INDEX DOCD: CPT | Performed by: FAMILY MEDICINE

## 2018-09-24 RX ORDER — MELOXICAM 15 MG/1
TABLET ORAL
Qty: 30 TABLET | Refills: 3 | Status: SHIPPED | OUTPATIENT
Start: 2018-09-24 | End: 2019-01-19 | Stop reason: SDUPTHER

## 2018-09-24 NOTE — PROGRESS NOTES
FAMILY PRACTICE OFFICE VISIT       NAME: Vitaly Mercedes  AGE: 47 y o  SEX: female       : 1965        MRN: [id number]        Assessment and Plan     Problem List Items Addressed This Visit     Depression with anxiety    Hypothyroidism    Psoriasis with arthropathy (HCC)    Relevant Medications    diclofenac sodium (VOLTAREN) 1 %    meloxicam (MOBIC) 15 mg tablet    Reactive airway disease    Vitamin B12 deficiency    Relevant Orders    Vitamin B12    Chronic GERD    Relevant Orders    Ambulatory referral to Gastroenterology      Other Visit Diagnoses     Postgastrectomy malabsorption    -  Primary    Relevant Orders    Vitamin D 25 hydroxy    Iron, TIBC and Ferritin Panel    C-reactive protein    Sedimentation rate, automated    De Quervain's disease (radial styloid tenosynovitis)        Relevant Medications    diclofenac sodium (VOLTAREN) 1 %    Other Relevant Orders    Ambulatory referral to Orthopedic Surgery    Screening for colon cancer        Relevant Orders    Ambulatory referral to Gastroenterology    Screening for breast cancer        Relevant Orders    Mammo screening bilateral w 3d & cad       Patient presents for follow-up of chronic medical conditions  She will be proceeding with complete blood work including CBC, CMP, TSH, lipid panel, iron panel, no vitamin D as well as C reactive protein and sed rate  Psoriasis  Psoriatic arthropathy  Chronic arthralgias  Patient has been using gabapentin 200 mg 3 times daily with partial improvement of her symptoms  Dermatology:  Dr Dukes  Patient prefers to hold off further evaluation by Rheumatology  She will try Voltaren gel topically as needed  I also suggested to discontinue ibuprofen/Tylenol combo over-the-counter and try meloxicam 50 mg once a day p r n  Only  Left wrist tenosynovitis, patient will start using soft wrist splint and Voltaren gel topically    She would likely benefit from corticosteroid injections, referral to [facility] Hand surgery  Patient will start PT exercises at home  Hypothyroidism:  Continue Synthroid 100 mcg daily  July 2017 thyroid ultrasound revealed : Atrophic and heterogeneous thyroid likely secondary to chronic autoimmune thyroiditis  TSH has been fluctuating lately, possibly likely to malabsorption due to history of gastric bypass  Will await results of blood work  Acid reflux disease:  I advised patient to use Pepcid 40 mg once a day at bedtime  She may be alternating it with Protonix as needed  Referral to [facility] GI patient would likely benefit from EGD due to chronic symptoms of acid reflux disease  Health maintenance:  Referral for mammography and colonoscopy  Patient and I had long discussion about pros and cons of Cologuard versus colonoscopy  I strongly suggested colonoscopy as more reliable screening tool for the young patient  Depression  Anxiety  She remains on regimen of Wellbutrin, Lexapro and Vyvanse, managed by Psychiatry  Hypertension:  Well controlled, continue metoprolol 50 mg twice a day  Reactive airway disease:  Continue Symbicort and Xopenex p r n     I have spent 40 minutes with Patient  today in which greater than 50% of this time was spent in counseling/coordination of care regarding Diagnostic results, Prognosis, Risks and benefits of tx options, Intructions for management, Patient and family education, Importance of tx compliance, Risk factor reductions and Impressions  Patient Instructions   Nithya Nur   WHAT YOU NEED TO KNOW:   What is de Quervain's disease? De Quervain's disease is inflammation of the tendons on the thumb side of your wrist  Tendons are thick strands of tissue that connect muscles to bones  What causes de Quervain's disease? Ruth Lalo disease is usually caused by frequent, repeated movements of your thumb or wrist  For example, lifting a small child, sewing, typing, or playing the piano can cause inflammation   A direct blow to the thumb may also damage the tendon and form scar tissue  This scar tissue can keep the tendon from working properly  What are the signs and symptoms of de Quervain's disease? · Pain and swelling near the base of your thumb are the most common symptoms  This usually occurs when you move your wrist up and down, grasp an object, or make a fist     · You will hear a grating noise when you move or rub your thumb or wrist     · Your thumb and wrist may be weak and you may have limited movement  How is de Quervain's disease diagnosed? Your healthcare provider will ask about your medical history and examine you  He will ask you to make a fist with your thumb touching the palm of your hand  Then he will ask you to move your hand and wrist in certain directions  He will check to see if you have pain, weakness, or problems with movement  Both hands may need to be checked  You may also need any of the following:  · X-rays: You may need pictures of your wrist and hand to check for a fracture  X-rays of both hands and wrists may be done  · MRI:  This scan uses powerful magnets and a computer to take pictures of your wrist and hand  An MRI may show if you have de Quervain's disease  You may be given dye to help the pictures show up better  Tell healthcare providers if you are allergic to iodine or seafood  You may also be allergic to the dye  Do not enter the MRI room with anything metal  Metal can cause serious injury  Tell healthcare providers if you have any metal in or on your body  How is de Quervain's disease treated? · Medicines:      ¨ NSAIDs:  These medicines decrease swelling, pain, and fever  They are available without a doctor's order  Ask which medicine is right for you, and how much to take  Take as directed  NSAIDs can cause stomach bleeding or kidney problems if not taken correctly  ¨ Steroid injection: This decreases long-term pain and swelling   It is usually injected into your wrist or hand  · Surgery: This may be done if other treatments do not work or your pain interferes with your daily activities  During surgery, an incision is made in the tissue that covers the tendon  This helps release pressure and reduce pain so your tendon can move freely  How can I manage my symptoms? · Splint or brace: These devices will help decrease pain, limit movement, and protect your wrist so that it can heal  Make sure your device is comfortable  If it is too tight, your fingers may feel numb or tingly  Do not push or lean on your device because it can break  · Physical or occupational therapy:  You may need to see a physical or occupational therapist to teach you special exercises  These exercises help improve movement and decrease pain  They also help improve strength and decrease your risk for loss of function  Therapists will help you make changes to your daily activities to decrease stress and pressure on the tendons  · Rest:  Rest your injured thumb or wrist  Avoid twisting, grasping, or gripping movements  Ask when you can return to your normal activities  What are the risks of de Quervain's disease? · Your thumb or wrist may not move the way it did before, even after treatment  It may take time and commitment to therapy to regain strength and normal movement of your thumb and wrist      · Without treatment, you may have increased pain and swelling  Over time, your movement and function will decrease  Eventually, you may not be able to move or use your thumb or wrist   When should I contact my healthcare provider? · Your splint or brace is too tight and you cannot loosen it  · You have a fever  · Your pain and swelling get worse or do not go away  · Your cannot grasp objects because of the pain and swelling  · You have questions or concerns about your condition or care  When should I seek immediate care?    · You cannot move your thumb or wrist     · Your fingers feel numb, tingly, cool to the touch, or look blue or pale  CARE AGREEMENT:   You have the right to help plan your care  Learn about your health condition and how it may be treated  Discuss treatment options with your caregivers to decide what care you want to receive  You always have the right to refuse treatment  The above information is an  only  It is not intended as medical advice for individual conditions or treatments  Talk to your doctor, nurse or pharmacist before following any medical regimen to see if it is safe and effective for you  © 2017 [address] [facility] Information is for End User's use only and may not be sold, redistributed or otherwise used for commercial purposes  All illustrations and images included in CareNotes® are the copyrighted property of A D A M , Inc  or [employer] ? Please proceed with blood work  And mammography  Please schedule EGD and colonoscopy            Chief Complaint     Chief Complaint   Patient presents with    Follow-up     Patient is here for a follow-up  Patient would like to discuss having the Cologaurd   Wrist Pain     Patient c/o left wrist pain and swelling x's 3 mths  History of Present Illness     Patient presents for follow-up of chronic medical conditions  She remains on medications for hypothyroidism, acid reflux disease, reactive airway disease, she is following up with Psychiatry for treatment of depression, anxiety and right earD  Patient is pleased with current medication regimen including Wellbutrin, Lexapro and Vyvanse  Reactive airway disease:  She has been experiencing increased chest tightness within past few days with flare up of seasonal allergies  She uses Symbicort and Xopenex as needed, medications work  History of gastric bypass surgery  Patient is past due routine blood work    She remains on multivitamin daily along with vitamin D3 2718-5849 units once a day   Hypothyroidism  She remains on 100 mcg once a day  She is also using gabapentin 200 mg b i d  or t i d  which was prescribed a while ago by Dermatology  History of psoriasis and psoriatic arthritis  Patient was seen by multiple specialists/Orthopedic surgery and Rheumatology in the past   She is not favoring any further evaluation by rheumatologist since she would like to prefer to avoid any biologic agents  Patient has been using ibuprofen and Tylenol  She has noticed increased reflux symptoms  She has been managing chronic GERD with combination of Protonix and Pepcid as needed  Patient is complaining of persistent pain in the left wrist for almost 3 months  No injury or fall  Pain along left thumb, shooting to her forearm  No numbness or tingling  No symptoms on the right hand, patient is right handed  Reportedly, patient was seen at urgent care center and had normal x-rays  No further recommendations were provided  Health maintenance:  Patient is following up with gyn  She is due for mammography  She is reluctant to proceed with colonoscopy  Patient is interested in Cologuard screening  Wrist Pain          Review of Systems   Review of Systems   Constitutional: Negative  HENT: Positive for postnasal drip  Eyes: Negative  Respiratory: Positive for cough and chest tightness  Negative for wheezing  Cardiovascular: Negative  Gastrointestinal: Negative  Endocrine: Negative  Genitourinary: Negative  Musculoskeletal: Positive for arthralgias and back pain  Allergic/Immunologic: Negative  Neurological: Negative  Hematological: Negative  Psychiatric/Behavioral: Negative           As outlined above       Active Problem List     Patient Active Problem List   Diagnosis    Anemia    Arthritis    Attention deficit hyperactivity disorder    Atypical chest pain    Depression with anxiety    Dysmenorrhea    Eczema    Elevated C-reactive protein    Elevated WBC count    Generalized anxiety disorder    Herpes simplex infection    Hypothyroidism    Leiomyoma of uterus    Masses of both breasts    Pain syndrome, chronic    Psoriasis with arthropathy (HCC)    Reactive airway disease    Vitamin B12 deficiency    Chronic GERD       Past Medical History:  Past Medical History:   Diagnosis Date    Anxiety     Disorder, per [employer]scripts    Arthritis     Disease of thyroid gland     HPV in female     Osteoarthritis        Past Surgical History:  Past Surgical History:   Procedure Laterality Date     SECTION      GASTRIC BYPASS      For Morbid Obesity, per [employer]scripts    HIP SURGERY      MOUTH SURGERY      SINUS SURGERY         Family History:  Family History   Problem Relation Age of Onset    Dementia Mother     Osteopenia Mother     Leukemia Father        Social History:  Social History     Social History    Marital status: /Civil Union     Spouse name: N/A    Number of children: 2    Years of education: N/A     Occupational History    currently works full-time      Social History Main Topics    Smoking status: Former Smoker    Smokeless tobacco: Never Used      Comment: Never a smoker, per [employer]scripts    Alcohol use No    Drug use: No    Sexual activity: Yes     Other Topics Concern    Not on file     Social History Narrative        2 children    Working currently    Always uses seat belt    College student    Exercise frequency     Feels safe at home           Objective     Vitals:    18 1416   BP: 124/82   Pulse: 72   Resp: 16   Temp: (!) 95 3 °F (35 2 °C)     Wt Readings from Last 3 Encounters:   18 80 6 kg (177 lb 12 8 oz)   18 80 3 kg (177 lb)   18 82 5 kg (181 lb 12 8 oz)       Physical Exam   Constitutional: She is oriented to person, place, and time  She appears well-developed and well-nourished  HENT:   Head: Normocephalic and atraumatic     Mouth/Throat: No oropharyngeal exudate (Postnasal drip, no erythema)  Eyes: Conjunctivae are normal    Neck: Neck supple  Carotid bruit is not present  No thyromegaly present  Cardiovascular: Normal rate, regular rhythm and normal heart sounds  No murmur heard  Pulmonary/Chest: Breath sounds normal  No respiratory distress  She has no wheezes  Musculoskeletal: Normal range of motion  Left wrist:  Mild edema, mild warmth  Positive Finkelstein sign  Tenderness with extension and outward deviation  Neurological: She is alert and oriented to person, place, and time  No cranial nerve deficit  Psychiatric: She has a normal mood and affect  Her behavior is normal    Nursing note and vitals reviewed        Pertinent Laboratory/Diagnostic Studies:  Lab Results   Component Value Date    GLUCOSE 117 11/16/2015    BUN 15 11/10/2016    CREATININE 0 76 11/10/2016    CALCIUM 8 7 11/10/2016     11/10/2016    K 4 0 11/10/2016    CO2 33 (H) 11/10/2016     11/10/2016     Lab Results   Component Value Date    ALT 29 11/10/2016    AST 24 11/10/2016    ALKPHOS 84 11/10/2016    BILITOT 0 35 11/16/2015       Lab Results   Component Value Date    WBC 8 14 04/19/2017    HGB 12 3 04/19/2017    HCT 38 4 04/19/2017    MCV 90 04/19/2017     04/19/2017       No results found for: TSH    Lab Results   Component Value Date    CHOL 255 04/06/2015     Lab Results   Component Value Date    TRIG 88 11/10/2016     Lab Results   Component Value Date    HDL 77 (H) 11/10/2016     Lab Results   Component Value Date    LDLCALC 116 (H) 11/10/2016     No results found for: HGBA1C    Results for orders placed or performed in visit on 07/02/18   TSH, 3rd generation   Result Value Ref Range    TSH 3RD GENERATON 43 300 (H) 0 358 - 3 740 uIU/mL       Orders Placed This Encounter   Procedures    Mammo screening bilateral w 3d & cad    Vitamin B12    Vitamin D 25 hydroxy    Iron, TIBC and Ferritin Panel    C-reactive protein    Sedimentation rate, automated    Ambulatory referral to Gastroenterology    Ambulatory referral to Orthopedic Surgery       ALLERGIES:  Allergies   Allergen Reactions    Gabapentin      Other reaction(s): Unknown Allergic Reaction    Cephalexin Rash     Reaction Date: 28Jul2011;     Moxifloxacin Rash     Reaction Date: 18Apr2011;     Sulfamethoxazole-Trimethoprim Rash       Current Medications     Current Outpatient Prescriptions   Medication Sig Dispense Refill    buPROPion (WELLBUTRIN XL) 300 mg 24 hr tablet Take 1 tablet by mouth daily      escitalopram (LEXAPRO) 20 mg tablet Take 1 tablet by mouth daily      famotidine (PEPCID) 40 MG tablet Take 1 tablet (40 mg total) by mouth daily at bedtime 30 tablet 0    gabapentin (NEURONTIN) 100 mg capsule Take 100 mg by mouth daily        levalbuterol (XOPENEX HFA) 45 mcg/act inhaler Inhale every 4 (four) hours      levothyroxine 100 mcg tablet Take 1 tablet (100 mcg total) by mouth daily Take half a tablet once a day for 6 days then use 1 tablet daily (Patient taking differently: Take 100 mcg by mouth daily  ) 30 tablet 0    lisdexamfetamine (VYVANSE) 40 MG capsule Take by mouth 2 (two) times a day      metoprolol tartrate (LOPRESSOR) 50 mg tablet TAKE 1 TABLET BY MOUTH DAILY 90 tablet 1    mometasone (NASONEX) 50 mcg/act nasal spray 2 sprays into each nostril daily      pantoprazole (PROTONIX) 40 mg tablet Take 1 tablet by mouth Daily      valACYclovir (VALTREX) 1,000 mg tablet Take by mouth      diclofenac sodium (VOLTAREN) 1 % Use 2 g 4 times daily as needed for upper body joints and 4 g 4 times daily as needed for lower body joints  Up to 3-4 times daily 5 Tube 5    meloxicam (MOBIC) 15 mg tablet Take 1 tablet once a day as needed for joint pain after food, do not combine with Voltaren gel 30 tablet 3     No current facility-administered medications for this visit            Health Maintenance     Health Maintenance   Topic Date Due    CRC Screening: Colonoscopy 1965    DTaP,Tdap,and Td Vaccines (1 - Tdap) 01/19/1987    INFLUENZA VACCINE  09/01/2018    Depression Screening PHQ  06/26/2019     Immunization History   Administered Date(s) Administered    Influenza Quadrivalent Preservative Free 3 years and older IM 11/28/2016    Influenza TIV (IM) 10/14/2009, 09/16/2011, 01/10/2013    Pneumococcal Polysaccharide PPV23 02/01/2013    Tuberculin Skin Test-PPD Intradermal 12/13/2011       Vona Harada, MD

## 2018-09-24 NOTE — PATIENT INSTRUCTIONS
Maryda Alberto Disease   WHAT YOU NEED TO KNOW:   What is de Quervain's disease? De Quervain's disease is inflammation of the tendons on the thumb side of your wrist  Tendons are thick strands of tissue that connect muscles to bones  What causes de Quervain's disease? Annice Files disease is usually caused by frequent, repeated movements of your thumb or wrist  For example, lifting a small child, sewing, typing, or playing the piano can cause inflammation  A direct blow to the thumb may also damage the tendon and form scar tissue  This scar tissue can keep the tendon from working properly  What are the signs and symptoms of de Quervain's disease? · Pain and swelling near the base of your thumb are the most common symptoms  This usually occurs when you move your wrist up and down, grasp an object, or make a fist     · You will hear a grating noise when you move or rub your thumb or wrist     · Your thumb and wrist may be weak and you may have limited movement  How is de Quervain's disease diagnosed? Your healthcare provider will ask about your medical history and examine you  He will ask you to make a fist with your thumb touching the palm of your hand  Then he will ask you to move your hand and wrist in certain directions  He will check to see if you have pain, weakness, or problems with movement  Both hands may need to be checked  You may also need any of the following:  · X-rays: You may need pictures of your wrist and hand to check for a fracture  X-rays of both hands and wrists may be done  · MRI:  This scan uses powerful magnets and a computer to take pictures of your wrist and hand  An MRI may show if you have de Quervain's disease  You may be given dye to help the pictures show up better  Tell healthcare providers if you are allergic to iodine or seafood  You may also be allergic to the dye  Do not enter the MRI room with anything metal  Metal can cause serious injury   Tell healthcare providers if you have any metal in or on your body  How is de Quervain's disease treated? · Medicines:      ¨ NSAIDs:  These medicines decrease swelling, pain, and fever  They are available without a doctor's order  Ask which medicine is right for you, and how much to take  Take as directed  NSAIDs can cause stomach bleeding or kidney problems if not taken correctly  ¨ Steroid injection: This decreases long-term pain and swelling  It is usually injected into your wrist or hand  · Surgery: This may be done if other treatments do not work or your pain interferes with your daily activities  During surgery, an incision is made in the tissue that covers the tendon  This helps release pressure and reduce pain so your tendon can move freely  How can I manage my symptoms? · Splint or brace: These devices will help decrease pain, limit movement, and protect your wrist so that it can heal  Make sure your device is comfortable  If it is too tight, your fingers may feel numb or tingly  Do not push or lean on your device because it can break  · Physical or occupational therapy:  You may need to see a physical or occupational therapist to teach you special exercises  These exercises help improve movement and decrease pain  They also help improve strength and decrease your risk for loss of function  Therapists will help you make changes to your daily activities to decrease stress and pressure on the tendons  · Rest:  Rest your injured thumb or wrist  Avoid twisting, grasping, or gripping movements  Ask when you can return to your normal activities  What are the risks of de Quervain's disease? · Your thumb or wrist may not move the way it did before, even after treatment  It may take time and commitment to therapy to regain strength and normal movement of your thumb and wrist      · Without treatment, you may have increased pain and swelling  Over time, your movement and function will decrease   Eventually, you may not be able to move or use your thumb or wrist   When should I contact my healthcare provider? · Your splint or brace is too tight and you cannot loosen it  · You have a fever  · Your pain and swelling get worse or do not go away  · Your cannot grasp objects because of the pain and swelling  · You have questions or concerns about your condition or care  When should I seek immediate care? · You cannot move your thumb or wrist     · Your fingers feel numb, tingly, cool to the touch, or look blue or pale  CARE AGREEMENT:   You have the right to help plan your care  Learn about your health condition and how it may be treated  Discuss treatment options with your caregivers to decide what care you want to receive  You always have the right to refuse treatment  The above information is an  only  It is not intended as medical advice for individual conditions or treatments  Talk to your doctor, nurse or pharmacist before following any medical regimen to see if it is safe and effective for you  © 2017 [address] [employer]  Information is for End User's use only and may not be sold, redistributed or otherwise used for commercial purposes  All illustrations and images included in CareNotes® are the copyrighted property of [employer]  or Melvin Ovalles  [facility] ?     Please proceed with blood work  And mammography  Please schedule EGD and colonoscopy

## 2018-10-05 ENCOUNTER — TRANSCRIBE ORDERS (OUTPATIENT)
Dept: LAB | Facility: CLINIC | Age: 53
End: 2018-10-05

## 2018-10-05 ENCOUNTER — APPOINTMENT (OUTPATIENT)
Dept: LAB | Facility: CLINIC | Age: 53
End: 2018-10-05
Payer: COMMERCIAL

## 2018-10-05 DIAGNOSIS — E03.9 HYPOTHYROIDISM, UNSPECIFIED TYPE: ICD-10-CM

## 2018-10-05 DIAGNOSIS — K91.2 POSTGASTRECTOMY MALABSORPTION: ICD-10-CM

## 2018-10-05 DIAGNOSIS — K91.2 POST-RESECTION MALABSORPTION: ICD-10-CM

## 2018-10-05 DIAGNOSIS — D64.9 ANEMIA, UNSPECIFIED TYPE: ICD-10-CM

## 2018-10-05 DIAGNOSIS — K21.9 CHRONIC GERD: ICD-10-CM

## 2018-10-05 DIAGNOSIS — Z90.3 POSTGASTRECTOMY MALABSORPTION: ICD-10-CM

## 2018-10-05 DIAGNOSIS — E53.8 VITAMIN B12 DEFICIENCY: ICD-10-CM

## 2018-10-05 DIAGNOSIS — K91.2 POST-RESECTION MALABSORPTION: Primary | ICD-10-CM

## 2018-10-05 LAB
25(OH)D3 SERPL-MCNC: 26.7 NG/ML (ref 30–100)
ALBUMIN SERPL BCP-MCNC: 3.4 G/DL (ref 3.5–5)
ALP SERPL-CCNC: 101 U/L (ref 46–116)
ALT SERPL W P-5'-P-CCNC: 33 U/L (ref 12–78)
ANION GAP SERPL CALCULATED.3IONS-SCNC: 4 MMOL/L (ref 4–13)
AST SERPL W P-5'-P-CCNC: 29 U/L (ref 5–45)
BILIRUB SERPL-MCNC: 0.42 MG/DL (ref 0.2–1)
BUN SERPL-MCNC: 19 MG/DL (ref 5–25)
CALCIUM SERPL-MCNC: 8.5 MG/DL (ref 8.3–10.1)
CHLORIDE SERPL-SCNC: 102 MMOL/L (ref 100–108)
CHOLEST SERPL-MCNC: 220 MG/DL (ref 50–200)
CO2 SERPL-SCNC: 28 MMOL/L (ref 21–32)
CREAT SERPL-MCNC: 1 MG/DL (ref 0.6–1.3)
CRP SERPL QL: 10.1 MG/L
ERYTHROCYTE [DISTWIDTH] IN BLOOD BY AUTOMATED COUNT: 13.3 % (ref 11.6–15.1)
ERYTHROCYTE [SEDIMENTATION RATE] IN BLOOD: 34 MM/HOUR (ref 0–20)
FERRITIN SERPL-MCNC: 6 NG/ML (ref 8–388)
GFR SERPL CREATININE-BSD FRML MDRD: 65 ML/MIN/1.73SQ M
GLUCOSE P FAST SERPL-MCNC: 123 MG/DL (ref 65–99)
HCT VFR BLD AUTO: 35.7 % (ref 34.8–46.1)
HDLC SERPL-MCNC: 84 MG/DL (ref 40–60)
HGB BLD-MCNC: 11 G/DL (ref 11.5–15.4)
IRON SERPL-MCNC: 56 UG/DL (ref 50–170)
LDLC SERPL CALC-MCNC: 114 MG/DL (ref 0–100)
MCH RBC QN AUTO: 28.4 PG (ref 26.8–34.3)
MCHC RBC AUTO-ENTMCNC: 30.8 G/DL (ref 31.4–37.4)
MCV RBC AUTO: 92 FL (ref 82–98)
NONHDLC SERPL-MCNC: 136 MG/DL
PLATELET # BLD AUTO: 348 THOUSANDS/UL (ref 149–390)
PMV BLD AUTO: 9.6 FL (ref 8.9–12.7)
POTASSIUM SERPL-SCNC: 4.3 MMOL/L (ref 3.5–5.3)
PROT SERPL-MCNC: 7.4 G/DL (ref 6.4–8.2)
RBC # BLD AUTO: 3.87 MILLION/UL (ref 3.81–5.12)
SODIUM SERPL-SCNC: 134 MMOL/L (ref 136–145)
TIBC SERPL-MCNC: 388 UG/DL (ref 250–450)
TRIGL SERPL-MCNC: 109 MG/DL
TSH SERPL DL<=0.05 MIU/L-ACNC: 4.63 UIU/ML (ref 0.36–3.74)
VIT B12 SERPL-MCNC: 247 PG/ML (ref 100–900)
WBC # BLD AUTO: 8.03 THOUSAND/UL (ref 4.31–10.16)

## 2018-10-05 PROCEDURE — 85027 COMPLETE CBC AUTOMATED: CPT

## 2018-10-05 PROCEDURE — 82728 ASSAY OF FERRITIN: CPT

## 2018-10-05 PROCEDURE — 83540 ASSAY OF IRON: CPT

## 2018-10-05 PROCEDURE — 84443 ASSAY THYROID STIM HORMONE: CPT

## 2018-10-05 PROCEDURE — 80053 COMPREHEN METABOLIC PANEL: CPT

## 2018-10-05 PROCEDURE — 86140 C-REACTIVE PROTEIN: CPT

## 2018-10-05 PROCEDURE — 82607 VITAMIN B-12: CPT

## 2018-10-05 PROCEDURE — 82306 VITAMIN D 25 HYDROXY: CPT

## 2018-10-05 PROCEDURE — 80061 LIPID PANEL: CPT

## 2018-10-05 PROCEDURE — 83550 IRON BINDING TEST: CPT

## 2018-10-05 PROCEDURE — 36415 COLL VENOUS BLD VENIPUNCTURE: CPT

## 2018-10-05 PROCEDURE — 85652 RBC SED RATE AUTOMATED: CPT

## 2018-10-07 ENCOUNTER — TELEPHONE (OUTPATIENT)
Dept: FAMILY MEDICINE CLINIC | Facility: CLINIC | Age: 53
End: 2018-10-07

## 2018-10-07 DIAGNOSIS — D64.9 ANEMIA, UNSPECIFIED TYPE: Primary | ICD-10-CM

## 2018-10-07 DIAGNOSIS — E03.9 HYPOTHYROIDISM, UNSPECIFIED TYPE: ICD-10-CM

## 2018-10-07 DIAGNOSIS — E53.8 VITAMIN B12 DEFICIENCY: ICD-10-CM

## 2018-10-08 NOTE — TELEPHONE ENCOUNTER
Results of recent blood work reviewed  Fasting blood sugar 123, TSH is elevated at 4 63  Ferritin level is 6, low, vitamin B12 level 247, hemoglobin 11 0, vitamin-D 26 7   C reactive protein is elevated at 10 1, sed rate is elevated 34  Will discuss results with patient  I did refer her to EGD and colonoscopy during last office visit  Current dose of Synthroid is 100 mcg daily    I do not see current iron supplements and vitamin-D supplements on her med list

## 2018-10-09 DIAGNOSIS — K21.9 CHRONIC GERD: Primary | ICD-10-CM

## 2018-10-09 RX ORDER — PANTOPRAZOLE SODIUM 40 MG/1
TABLET, DELAYED RELEASE ORAL
Qty: 90 TABLET | Refills: 3 | Status: SHIPPED | OUTPATIENT
Start: 2018-10-09 | End: 2019-10-20 | Stop reason: SDUPTHER

## 2018-10-10 RX ORDER — LEVOTHYROXINE SODIUM 112 UG/1
TABLET ORAL
Qty: 30 TABLET | Refills: 2 | Status: SHIPPED | OUTPATIENT
Start: 2018-10-10 | End: 2019-12-02 | Stop reason: SDUPTHER

## 2018-10-10 RX ORDER — CYANOCOBALAMIN 1000 UG/ML
INJECTION INTRAMUSCULAR; SUBCUTANEOUS
Qty: 10 ML | Refills: 3 | Status: SHIPPED | OUTPATIENT
Start: 2018-10-10 | End: 2019-10-26 | Stop reason: SDUPTHER

## 2018-10-10 RX ORDER — IRON POLYSACCHARIDE COMPLEX 150 MG
150 CAPSULE ORAL DAILY
Qty: 30 CAPSULE | Refills: 3 | Status: SHIPPED | OUTPATIENT
Start: 2018-10-10 | End: 2020-03-09

## 2018-10-10 NOTE — TELEPHONE ENCOUNTER
I spoke with patient on on Monday, October 8th, regarding blood work results  Will increase dose of Synthroid from 100-112 mcg daily  Will start her on daily iron supplement  Patient will start vitamin B12 injections at home  Will repeat blood work including TSH and CBC in 2 months  Referral to EGD and colonoscopy  Patient will increase dose of vitamin D3 from 3000 units daily to 5000 units daily and will use it in tablets  Patient understands instructions and agrees

## 2018-12-08 DIAGNOSIS — F41.1 GENERALIZED ANXIETY DISORDER: ICD-10-CM

## 2018-12-09 DIAGNOSIS — E03.9 HYPOTHYROIDISM, UNSPECIFIED TYPE: ICD-10-CM

## 2018-12-09 DIAGNOSIS — K21.9 CHRONIC GERD: ICD-10-CM

## 2018-12-10 RX ORDER — FAMOTIDINE 40 MG/1
40 TABLET, FILM COATED ORAL
Qty: 90 TABLET | Refills: 1 | Status: SHIPPED | OUTPATIENT
Start: 2018-12-10 | End: 2019-06-05 | Stop reason: SDUPTHER

## 2018-12-10 RX ORDER — LEVOTHYROXINE SODIUM 112 UG/1
112 TABLET ORAL DAILY
Qty: 30 TABLET | Refills: 1 | Status: SHIPPED | OUTPATIENT
Start: 2018-12-10 | End: 2019-03-11

## 2018-12-11 RX ORDER — METOPROLOL TARTRATE 50 MG/1
TABLET, FILM COATED ORAL
Qty: 90 TABLET | Refills: 1 | Status: SHIPPED | OUTPATIENT
Start: 2018-12-11 | End: 2019-06-05 | Stop reason: SDUPTHER

## 2019-01-19 DIAGNOSIS — L40.50 PSORIASIS WITH ARTHROPATHY (HCC): ICD-10-CM

## 2019-01-19 RX ORDER — MELOXICAM 15 MG/1
TABLET ORAL
Qty: 30 TABLET | Refills: 3 | Status: SHIPPED | OUTPATIENT
Start: 2019-01-19 | End: 2019-05-11 | Stop reason: SDUPTHER

## 2019-03-11 ENCOUNTER — OFFICE VISIT (OUTPATIENT)
Dept: FAMILY MEDICINE CLINIC | Facility: CLINIC | Age: 54
End: 2019-03-11
Payer: COMMERCIAL

## 2019-03-11 VITALS
TEMPERATURE: 97.4 F | WEIGHT: 178 LBS | SYSTOLIC BLOOD PRESSURE: 130 MMHG | DIASTOLIC BLOOD PRESSURE: 90 MMHG | HEIGHT: 64 IN | BODY MASS INDEX: 30.39 KG/M2 | RESPIRATION RATE: 16 BRPM | HEART RATE: 68 BPM

## 2019-03-11 DIAGNOSIS — J32.9 SINUSITIS, UNSPECIFIED CHRONICITY, UNSPECIFIED LOCATION: Primary | ICD-10-CM

## 2019-03-11 DIAGNOSIS — L30.9 ECZEMA, UNSPECIFIED TYPE: ICD-10-CM

## 2019-03-11 PROCEDURE — 3008F BODY MASS INDEX DOCD: CPT | Performed by: FAMILY MEDICINE

## 2019-03-11 PROCEDURE — 1036F TOBACCO NON-USER: CPT | Performed by: FAMILY MEDICINE

## 2019-03-11 PROCEDURE — 99213 OFFICE O/P EST LOW 20 MIN: CPT | Performed by: FAMILY MEDICINE

## 2019-03-11 RX ORDER — IRON ASPGLY,PS/C/B12/FA/CA/SUC 150-25-1
CAPSULE ORAL
COMMUNITY
Start: 2016-11-28

## 2019-03-11 RX ORDER — TRIAMCINOLONE ACETONIDE 5 MG/G
OINTMENT TOPICAL 2 TIMES DAILY
Qty: 30 G | Refills: 2 | Status: SHIPPED | OUTPATIENT
Start: 2019-03-11 | End: 2019-12-30 | Stop reason: SDUPTHER

## 2019-03-11 RX ORDER — AMOXICILLIN AND CLAVULANATE POTASSIUM 875; 125 MG/1; MG/1
1 TABLET, FILM COATED ORAL EVERY 12 HOURS SCHEDULED
Qty: 14 TABLET | Refills: 0 | Status: SHIPPED | OUTPATIENT
Start: 2019-03-11 | End: 2019-03-18

## 2019-03-11 RX ORDER — BUDESONIDE AND FORMOTEROL FUMARATE DIHYDRATE 160; 4.5 UG/1; UG/1
AEROSOL RESPIRATORY (INHALATION)
COMMUNITY
Start: 2011-09-10 | End: 2020-03-09 | Stop reason: SDUPTHER

## 2019-03-11 NOTE — PROGRESS NOTES
FAMILY PRACTICE OFFICE VISIT       NAME: Karma Osman  AGE: 49 y o  SEX: female       : 1965        MRN: [id number]    DATE: 3/11/2019  TIME: 10:33 PM    Assessment and Plan     Problem List Items Addressed This Visit        Respiratory    Sinusitis - Primary    Relevant Medications    amoxicillin-clavulanate (AUGMENTIN) 875-125 mg per tablet       Musculoskeletal and Integument    Eczema    Relevant Medications    triamcinolone (KENALOG) 0 5 % ointment        25-year-old female presents with symptoms that appear consistent with sinusitis  Will start patient on Augmentin  Recommend continue with symptomatic treatment and supportive measures including adequate hydration  Would benefit from nasal saline rinses  Would also benefit from cool mist humidifier and hot steam modification  Return parameters discussed  There are no Patient Instructions on file for this visit  Chief Complaint     Chief Complaint   Patient presents with   Colletta Claw Like Symptoms     pt is here for sinus pressure, throat 2 + wks       History of Present Illness     HPI  25-year-old female presents with a 3 week h/o sinus pressure, PND, cough, ear pressure, runny nose, nasal congestion   chills   took one of her daughter's augmentin last night   daugher is sick with sinus infection         Review of Systems   Review of Systems   Constitutional: Negative for chills and fever  HENT: Positive for congestion, postnasal drip, rhinorrhea and sinus pressure  Respiratory: Positive for cough  Negative for chest tightness, shortness of breath and wheezing          Active Problem List     Patient Active Problem List   Diagnosis    Anemia    Attention deficit hyperactivity disorder    Depression with anxiety    Eczema    Generalized anxiety disorder    Hypothyroidism    Leiomyoma of uterus    Masses of both breasts    Pain syndrome, chronic    Psoriasis with arthropathy ([address] )    Reactive airway disease    Vitamin B12 deficiency    Chronic GERD    Essential hypertension    Sinusitis       Past Medical History:  Past Medical History:   Diagnosis Date    Anxiety     Disorder, per [employer]    Arthritis     Disease of thyroid gland     HPV in female     Osteoarthritis        Past Surgical History:  Past Surgical History:   Procedure Laterality Date     SECTION      GASTRIC BYPASS      For Morbid Obesity, per [employer]    HIP SURGERY      MOUTH SURGERY      SINUS SURGERY         Family History:  Family History   Problem Relation Age of Onset    Dementia Mother    Xin Guevara Osteopenia Mother     Leukemia Father        Social History:  Social History     Socioeconomic History    Marital status: /Civil Union     Spouse name: Not on file    Number of children: 2    Years of education: Not on file    Highest education level: Not on file   Occupational History    Occupation: currently works full-time   Social Needs    Financial resource strain: Not on file    Food insecurity:     Worry: Not on file     Inability: Not on file   [employer] needs:     Medical: Not on file     Non-medical: Not on file   Tobacco Use    Smoking status: Former Smoker    Smokeless tobacco: Never Used    Tobacco comment: Never a smoker, per [employer]scripts   Substance and Sexual Activity    Alcohol use: No    Drug use: No    Sexual activity: Yes   Lifestyle    Physical activity:     Days per week: Not on file     Minutes per session: Not on file    Stress: Not on file   Relationships    Social connections:     Talks on phone: Not on file     Gets together: Not on file     Attends [employer] service: Not on file     Active member of club or organization: Not on file     Attends meetings of clubs or organizations: Not on file     Relationship status: Not on file    Intimate partner violence:     Fear of current or ex partner: Not on file     Emotionally abused: Not on file     Physically abused: Not on file Forced sexual activity: Not on file   Other Topics Concern    Not on file   Social History Narrative        2 children    Working currently    Always uses seat belt    College student    Exercise frequency     Feels safe at home     I have reviewed the patient's medical history in detail; there are no changes to the history as noted in the electronic medical record  Objective     Vitals:    03/11/19 1538   BP: 130/90   Pulse: 68   Resp: 16   Temp: (!) 97 4 °F (36 3 °C)     Wt Readings from Last 3 Encounters:   03/11/19 80 7 kg (178 lb)   09/24/18 80 6 kg (177 lb 12 8 oz)   06/26/18 80 3 kg (177 lb)       Physical Exam   Constitutional: She appears well-developed and well-nourished  HENT:   Head: Normocephalic and atraumatic  Mouth/Throat: Oropharynx is clear and moist    TMs intact and clear  Nares with edema noted  Posterior oropharynx with drainage and erythema noted  Tender palpation of maxillary, ethmoid sinuses bilaterally   Neck: Normal range of motion  Neck supple  Cardiovascular: Normal rate, regular rhythm and normal heart sounds  Pulmonary/Chest: Effort normal and breath sounds normal    Musculoskeletal: Normal range of motion  Lymphadenopathy:     She has no cervical adenopathy  Nursing note and vitals reviewed        Pertinent Laboratory/Diagnostic Studies:  Lab Results   Component Value Date    GLUCOSE 117 11/16/2015    BUN 19 10/05/2018    CREATININE 1 00 10/05/2018    CALCIUM 8 5 10/05/2018     11/16/2015    K 4 3 10/05/2018    CO2 28 10/05/2018     10/05/2018     Lab Results   Component Value Date    ALT 33 10/05/2018    AST 29 10/05/2018    ALKPHOS 101 10/05/2018    BILITOT 0 35 11/16/2015       Lab Results   Component Value Date    WBC 8 03 10/05/2018    HGB 11 0 (L) 10/05/2018    HCT 35 7 10/05/2018    MCV 92 10/05/2018     10/05/2018       No results found for: TSH    Lab Results   Component Value Date    CHOL 255 04/06/2015     Lab Results   Component Value Date    TRIG 109 10/05/2018     Lab Results   Component Value Date    HDL 84 (H) 10/05/2018     Lab Results   Component Value Date    LDLCALC 114 (H) 10/05/2018     No results found for: HGBA1C    Results for orders placed or performed in visit on 10/05/18   CBC   Result Value Ref Range    WBC 8 03 4 31 - 10 16 Thousand/uL    RBC 3 87 3 81 - 5 12 Million/uL    Hemoglobin 11 0 (L) 11 5 - 15 4 g/dL    Hematocrit 35 7 34 8 - 46 1 %    MCV 92 82 - 98 fL    MCH 28 4 26 8 - 34 3 pg    MCHC 30 8 (L) 31 4 - 37 4 g/dL    RDW 13 3 11 6 - 15 1 %    Platelets 813 962 - 191 Thousands/uL    MPV 9 6 8 9 - 12 7 fL   Comprehensive metabolic panel   Result Value Ref Range    Sodium 134 (L) 136 - 145 mmol/L    Potassium 4 3 3 5 - 5 3 mmol/L    Chloride 102 100 - 108 mmol/L    CO2 28 21 - 32 mmol/L    ANION GAP 4 4 - 13 mmol/L    BUN 19 5 - 25 mg/dL    Creatinine 1 00 0 60 - 1 30 mg/dL    Glucose, Fasting 123 (H) 65 - 99 mg/dL    Calcium 8 5 8 3 - 10 1 mg/dL    AST 29 5 - 45 U/L    ALT 33 12 - 78 U/L    Alkaline Phosphatase 101 46 - 116 U/L    Total Protein 7 4 6 4 - 8 2 g/dL    Albumin 3 4 (L) 3 5 - 5 0 g/dL    Total Bilirubin 0 42 0 20 - 1 00 mg/dL    eGFR 65 ml/min/1 73sq m   Lipid panel   Result Value Ref Range    Cholesterol 220 (H) 50 - 200 mg/dL    Triglycerides 109 <=150 mg/dL    HDL, Direct 84 (H) 40 - 60 mg/dL    LDL Calculated 114 (H) 0 - 100 mg/dL    Non-HDL-Chol (CHOL-HDL) 136 mg/dl   TSH, 3rd generation   Result Value Ref Range    TSH 3RD GENERATON 4 630 (H) 0 358 - 3 740 uIU/mL   Vitamin B12   Result Value Ref Range    Vitamin B-12 247 100 - 900 pg/mL   Vitamin D 25 hydroxy   Result Value Ref Range    Vit D, 25-Hydroxy 26 7 (L) 30 0 - 100 0 ng/mL   C-reactive protein   Result Value Ref Range    CRP 10 1 (H) <3 0 mg/L   Sedimentation rate, automated   Result Value Ref Range    Sed Rate 34 (H) 0 - 20 mm/hour   Iron   Result Value Ref Range    Iron 56 50 - 170 ug/dL   Ferritin   Result Value Ref Range    Ferritin 6 (L) 8 - 388 ng/mL   TIBC   Result Value Ref Range    TIBC 388 250 - 450 ug/dL       No orders of the defined types were placed in this encounter        ALLERGIES:  Allergies   Allergen Reactions    Cephalexin Rash     Reaction Date: 28Jul2011;     Moxifloxacin Rash     Reaction Date: 18Apr2011;     Sulfamethoxazole-Trimethoprim Rash       Current Medications     Current Outpatient Medications   Medication Sig Dispense Refill    budesonide-formoterol (SYMBICORT) 160-4 5 mcg/act inhaler Inhale      buPROPion (WELLBUTRIN XL) 300 mg 24 hr tablet Take 1 tablet by mouth daily      cyanocobalamin 1,000 mcg/mL Inject 1 mL IM once a week x 4 times then every 2 weeks x 4 times  then use 1 mL IM every 3-4 weeks 10 mL 3    diclofenac sodium (VOLTAREN) 1 % Use 2 g 4 times daily as needed for upper body joints and 4 g 4 times daily as needed for lower body joints  Up to 3-4 times daily 5 Tube 5    escitalopram (LEXAPRO) 20 mg tablet Take 1 tablet by mouth daily      famotidine (PEPCID) 40 MG tablet TAKE 1 TABLET (40 MG TOTAL) BY MOUTH DAILY AT BEDTIME 90 tablet 1    Fe-Succ Ac-C-Thre Ac-B12-FA (FERREX 150 FORTE PLUS)  MG CAPS Take by mouth      gabapentin (NEURONTIN) 100 mg capsule Take 100 mg by mouth daily        iron polysaccharides (NIFEREX) 150 mg capsule Take 1 capsule (150 mg total) by mouth daily 30 capsule 3    levalbuterol (XOPENEX HFA) 45 mcg/act inhaler Inhale every 4 (four) hours      levothyroxine 112 mcg tablet Take 1 tablet once a day 30 tablet 2    lisdexamfetamine (VYVANSE) 40 MG capsule Take by mouth 2 (two) times a day      meloxicam (MOBIC) 15 mg tablet TAKE 1 TABLET ONCE A DAY AS NEEDED FOR JOINT PAIN AFTER FOOD, DO NOT COMBINE WITH VOLTAREN GEL 30 tablet 3    metoprolol tartrate (LOPRESSOR) 50 mg tablet TAKE 1 TABLET BY MOUTH EVERY DAY 90 tablet 1    mometasone (NASONEX) 50 mcg/act nasal spray 2 sprays into each nostril daily      pantoprazole (PROTONIX) 40 mg tablet TAKE 1 TABLET DAILY 30 MINUTES BEFORE BREAKFAST 90 tablet 3    SYRINGE-NEEDLE, DISP, 3 ML (B-D SYRINGE/NEEDLE 3CC/23GX1") 23G X 1" 3 ML MISC Use  For vitamin B12 injections 10 each 5    valACYclovir (VALTREX) 1,000 mg tablet Take by mouth      amoxicillin-clavulanate (AUGMENTIN) 875-125 mg per tablet Take 1 tablet by mouth every 12 (twelve) hours for 7 days 14 tablet 0    triamcinolone (KENALOG) 0 5 % ointment Apply topically 2 (two) times a day 30 g 2     No current facility-administered medications for this visit            Health Maintenance     Health Maintenance   Topic Date Due    CRC Screening: Colonoscopy  1965    BMI: Followup Plan  01/19/1984    DTaP,Tdap,and Td Vaccines (1 - Tdap) 01/19/1987    INFLUENZA VACCINE  07/01/2018    Depression Screening PHQ  06/26/2019    BMI: Adult  03/11/2020    HEPATITIS B VACCINES  Aged Out     Immunization History   Administered Date(s) Administered    Influenza Quadrivalent Preservative Free 3 years and older IM 11/28/2016    Influenza TIV (IM) 10/14/2009, 09/16/2011, 01/10/2013    Pneumococcal Polysaccharide PPV23 02/01/2013    Tuberculin Skin Test-PPD Intradermal 12/13/2011       Jigar Blankenship MD

## 2019-03-21 DIAGNOSIS — E03.9 HYPOTHYROIDISM, UNSPECIFIED TYPE: ICD-10-CM

## 2019-03-21 DIAGNOSIS — M65.4 DE QUERVAIN'S DISEASE (RADIAL STYLOID TENOSYNOVITIS): ICD-10-CM

## 2019-03-22 RX ORDER — LEVOTHYROXINE SODIUM 112 UG/1
TABLET ORAL
Qty: 30 TABLET | Refills: 0 | Status: SHIPPED | OUTPATIENT
Start: 2019-03-22 | End: 2019-08-27 | Stop reason: SDUPTHER

## 2019-03-22 NOTE — TELEPHONE ENCOUNTER
Please contact patient  I refilled her Synthroid for 30 tablets and no refills  She was supposed to have blood work done a while ago to repeat her TSH and CBC  Her last thyroid test performed in October of 2018 was slightly under active  I need blood work results to adjust her medication further    Thank you

## 2019-03-25 DIAGNOSIS — E03.9 HYPOTHYROIDISM, UNSPECIFIED TYPE: ICD-10-CM

## 2019-03-26 RX ORDER — LEVOTHYROXINE SODIUM 112 UG/1
TABLET ORAL
Qty: 90 TABLET | Refills: 1 | OUTPATIENT
Start: 2019-03-26

## 2019-04-14 DIAGNOSIS — E03.9 HYPOTHYROIDISM, UNSPECIFIED TYPE: ICD-10-CM

## 2019-04-15 ENCOUNTER — TELEPHONE (OUTPATIENT)
Dept: FAMILY MEDICINE CLINIC | Facility: CLINIC | Age: 54
End: 2019-04-15

## 2019-04-15 RX ORDER — LEVOTHYROXINE SODIUM 112 UG/1
TABLET ORAL
Qty: 15 TABLET | Refills: 0 | Status: SHIPPED | OUTPATIENT
Start: 2019-04-15 | End: 2019-12-02

## 2019-05-11 DIAGNOSIS — L40.50 PSORIASIS WITH ARTHROPATHY (HCC): ICD-10-CM

## 2019-05-12 RX ORDER — MELOXICAM 15 MG/1
TABLET ORAL
Qty: 30 TABLET | Refills: 3 | Status: SHIPPED | OUTPATIENT
Start: 2019-05-12 | End: 2019-09-10 | Stop reason: SDUPTHER

## 2019-06-05 DIAGNOSIS — F41.1 GENERALIZED ANXIETY DISORDER: ICD-10-CM

## 2019-06-05 DIAGNOSIS — K21.9 CHRONIC GERD: ICD-10-CM

## 2019-06-05 RX ORDER — METOPROLOL TARTRATE 50 MG/1
TABLET, FILM COATED ORAL
Qty: 90 TABLET | Refills: 1 | Status: SHIPPED | OUTPATIENT
Start: 2019-06-05 | End: 2019-12-02 | Stop reason: SDUPTHER

## 2019-06-05 RX ORDER — FAMOTIDINE 40 MG/1
40 TABLET, FILM COATED ORAL
Qty: 90 TABLET | Refills: 1 | Status: SHIPPED | OUTPATIENT
Start: 2019-06-05 | End: 2020-03-09 | Stop reason: SDUPTHER

## 2019-08-26 ENCOUNTER — APPOINTMENT (OUTPATIENT)
Dept: LAB | Facility: CLINIC | Age: 54
End: 2019-08-26
Payer: COMMERCIAL

## 2019-08-26 ENCOUNTER — TELEPHONE (OUTPATIENT)
Dept: FAMILY MEDICINE CLINIC | Facility: CLINIC | Age: 54
End: 2019-08-26

## 2019-08-26 DIAGNOSIS — E03.9 HYPOTHYROIDISM, UNSPECIFIED TYPE: ICD-10-CM

## 2019-08-26 DIAGNOSIS — Z90.3 POSTGASTRECTOMY MALABSORPTION: ICD-10-CM

## 2019-08-26 DIAGNOSIS — K91.2 POSTGASTRECTOMY MALABSORPTION: ICD-10-CM

## 2019-08-26 DIAGNOSIS — E53.8 VITAMIN B12 DEFICIENCY: ICD-10-CM

## 2019-08-26 DIAGNOSIS — D64.9 ANEMIA, UNSPECIFIED TYPE: ICD-10-CM

## 2019-08-26 LAB
ERYTHROCYTE [DISTWIDTH] IN BLOOD BY AUTOMATED COUNT: 13 % (ref 11.6–15.1)
FERRITIN SERPL-MCNC: 9 NG/ML (ref 8–388)
HCT VFR BLD AUTO: 39.6 % (ref 34.8–46.1)
HGB BLD-MCNC: 12.4 G/DL (ref 11.5–15.4)
IRON SERPL-MCNC: 72 UG/DL (ref 50–170)
MCH RBC QN AUTO: 29.2 PG (ref 26.8–34.3)
MCHC RBC AUTO-ENTMCNC: 31.3 G/DL (ref 31.4–37.4)
MCV RBC AUTO: 93 FL (ref 82–98)
PLATELET # BLD AUTO: 334 THOUSANDS/UL (ref 149–390)
PMV BLD AUTO: 9.7 FL (ref 8.9–12.7)
RBC # BLD AUTO: 4.25 MILLION/UL (ref 3.81–5.12)
TIBC SERPL-MCNC: 412 UG/DL (ref 250–450)
TSH SERPL DL<=0.05 MIU/L-ACNC: 136 UIU/ML (ref 0.36–3.74)
WBC # BLD AUTO: 6.92 THOUSAND/UL (ref 4.31–10.16)

## 2019-08-26 PROCEDURE — 84443 ASSAY THYROID STIM HORMONE: CPT

## 2019-08-26 PROCEDURE — 83540 ASSAY OF IRON: CPT

## 2019-08-26 PROCEDURE — 36415 COLL VENOUS BLD VENIPUNCTURE: CPT

## 2019-08-26 PROCEDURE — 82728 ASSAY OF FERRITIN: CPT

## 2019-08-26 PROCEDURE — 85027 COMPLETE CBC AUTOMATED: CPT

## 2019-08-26 PROCEDURE — 83550 IRON BINDING TEST: CPT

## 2019-08-26 NOTE — TELEPHONE ENCOUNTER
----- Message from Trent Sherman MD sent at 8/26/2019  5:24 PM EDT -----  Please contact patient  Her thyroid function is extremely under active  I need to see her in the office tomorrow to evaluate  Please schedule any available [facility] appointment    Thank you

## 2019-08-27 DIAGNOSIS — E03.9 HYPOTHYROIDISM, UNSPECIFIED TYPE: Primary | ICD-10-CM

## 2019-08-27 DIAGNOSIS — E03.9 HYPOTHYROIDISM, UNSPECIFIED TYPE: ICD-10-CM

## 2019-08-27 RX ORDER — LEVOTHYROXINE SODIUM 112 UG/1
112 TABLET ORAL DAILY
Qty: 15 TABLET | Refills: 0 | OUTPATIENT
Start: 2019-08-27

## 2019-08-27 RX ORDER — LEVOTHYROXINE SODIUM 112 UG/1
TABLET ORAL
Qty: 30 TABLET | Refills: 0 | OUTPATIENT
Start: 2019-08-27

## 2019-08-27 RX ORDER — LEVOTHYROXINE SODIUM 112 UG/1
TABLET ORAL
Qty: 30 TABLET | Refills: 1 | Status: SHIPPED | OUTPATIENT
Start: 2019-08-27 | End: 2019-08-30 | Stop reason: SDUPTHER

## 2019-08-27 RX ORDER — LEVOTHYROXINE SODIUM 112 UG/1
112 TABLET ORAL DAILY
Qty: 30 TABLET | Refills: 0 | OUTPATIENT
Start: 2019-08-27

## 2019-08-27 NOTE — TELEPHONE ENCOUNTER
Spoke with pt, made aware as per Md's instructions  Pt reports she has not taken her Thyroid medication for approximately  a month  Pt was suppose to go for BW before obtaining additional RF's and she was unable to get to the lab till yesterday  Pt is requesting a RF for her Levothyroxine, She states she is very busy and has limited time  However, if Dr Gagan Gallardo really needs to see her, she will make apt

## 2019-08-27 NOTE — TELEPHONE ENCOUNTER
----- Message from Rebekah Coronel MD sent at 8/26/2019  5:24 PM EDT -----  Please contact patient  Her thyroid function is extremely under active  I need to see her in the office tomorrow to evaluate  Please schedule any available [facility] appointment    Thank you

## 2019-08-28 NOTE — TELEPHONE ENCOUNTER
Please contact patient  I just refilled her levothyroxine  She will take half a pill once a day for 2 weeks and then will increase dose to 1 pill daily  She cannot go back on a full strength of levothyroxine since she has been off medication for 1 month  It is very important that she repeats her blood work in 5-6 weeks  I will place orders    Thank you

## 2019-08-28 NOTE — TELEPHONE ENCOUNTER
Spoke w/ pt and advised medication sent to pharmacy and MD message and she will proceed w/ blood work

## 2019-08-30 DIAGNOSIS — E03.9 HYPOTHYROIDISM, UNSPECIFIED TYPE: ICD-10-CM

## 2019-08-30 RX ORDER — LEVOTHYROXINE SODIUM 112 UG/1
TABLET ORAL
Qty: 90 TABLET | Refills: 0 | Status: SHIPPED | OUTPATIENT
Start: 2019-08-30 | End: 2019-12-02 | Stop reason: SDUPTHER

## 2019-09-10 DIAGNOSIS — L40.50 PSORIASIS WITH ARTHROPATHY (HCC): ICD-10-CM

## 2019-09-11 RX ORDER — MELOXICAM 15 MG/1
TABLET ORAL
Qty: 30 TABLET | Refills: 3 | Status: SHIPPED | OUTPATIENT
Start: 2019-09-11 | End: 2020-01-16

## 2019-10-20 DIAGNOSIS — K21.9 CHRONIC GERD: ICD-10-CM

## 2019-10-20 RX ORDER — PANTOPRAZOLE SODIUM 40 MG/1
TABLET, DELAYED RELEASE ORAL
Qty: 90 TABLET | Refills: 3 | Status: SHIPPED | OUTPATIENT
Start: 2019-10-20 | End: 2020-03-09 | Stop reason: SDUPTHER

## 2019-10-26 DIAGNOSIS — E53.8 VITAMIN B12 DEFICIENCY: ICD-10-CM

## 2019-10-27 RX ORDER — CYANOCOBALAMIN 1000 UG/ML
INJECTION INTRAMUSCULAR; SUBCUTANEOUS
Qty: 10 ML | Refills: 3 | Status: SHIPPED | OUTPATIENT
Start: 2019-10-27 | End: 2020-03-09 | Stop reason: SDUPTHER

## 2019-12-02 ENCOUNTER — TELEPHONE (OUTPATIENT)
Dept: FAMILY MEDICINE CLINIC | Facility: CLINIC | Age: 54
End: 2019-12-02

## 2019-12-02 DIAGNOSIS — E03.9 HYPOTHYROIDISM, UNSPECIFIED TYPE: ICD-10-CM

## 2019-12-02 DIAGNOSIS — F41.1 GENERALIZED ANXIETY DISORDER: ICD-10-CM

## 2019-12-02 RX ORDER — LEVOTHYROXINE SODIUM 112 UG/1
TABLET ORAL
Qty: 90 TABLET | Refills: 0 | Status: SHIPPED | OUTPATIENT
Start: 2019-12-02 | End: 2020-03-21

## 2019-12-02 RX ORDER — LEVOTHYROXINE SODIUM 112 UG/1
TABLET ORAL
Qty: 30 TABLET | Refills: 0 | Status: SHIPPED | OUTPATIENT
Start: 2019-12-02 | End: 2019-12-02

## 2019-12-02 RX ORDER — METOPROLOL TARTRATE 50 MG/1
TABLET, FILM COATED ORAL
Qty: 90 TABLET | Refills: 0 | Status: SHIPPED | OUTPATIENT
Start: 2019-12-02 | End: 2020-02-19

## 2019-12-02 NOTE — TELEPHONE ENCOUNTER
Please contact [facility] in [place]  Correct prescription for levothyroxine should state 112 mcg,take  1 tablet once a day number 90 no refills  ( original prescription indicated half a tablet for 2 weeks and then 1 tablet daily)    Please contact patient  She is past due repeat blood work for [facility]   Her last test was significantly out of the range  It is very important that I monitor her thyroid function levels    Please advise her to proceed with labs ASAP, nonfasting  I did place orders for repeat TSH back  in August   She was supposed to do in October, please see telephone note from August 27th for details  Thank you

## 2019-12-03 NOTE — TELEPHONE ENCOUNTER
Please let patient know I refilled metoprolol  She is due for a check up with Dr Crystal Khan, her last check up was September 2018

## 2019-12-30 DIAGNOSIS — L30.9 ECZEMA, UNSPECIFIED TYPE: ICD-10-CM

## 2019-12-31 RX ORDER — TRIAMCINOLONE ACETONIDE 5 MG/G
OINTMENT TOPICAL
Qty: 30 G | Refills: 0 | Status: SHIPPED | OUTPATIENT
Start: 2019-12-31 | End: 2020-11-15 | Stop reason: SDUPTHER

## 2020-01-16 DIAGNOSIS — L40.50 PSORIASIS WITH ARTHROPATHY (HCC): ICD-10-CM

## 2020-01-16 RX ORDER — MELOXICAM 15 MG/1
TABLET ORAL
Qty: 30 TABLET | Refills: 0 | Status: SHIPPED | OUTPATIENT
Start: 2020-01-16 | End: 2020-03-02 | Stop reason: SDUPTHER

## 2020-02-13 ENCOUNTER — TELEPHONE (OUTPATIENT)
Dept: FAMILY MEDICINE CLINIC | Facility: CLINIC | Age: 55
End: 2020-02-13

## 2020-02-13 DIAGNOSIS — Z20.828 EXPOSURE TO INFLUENZA: Primary | ICD-10-CM

## 2020-02-13 RX ORDER — OSELTAMIVIR PHOSPHATE 75 MG/1
75 CAPSULE ORAL DAILY
Qty: 10 CAPSULE | Refills: 0 | Status: SHIPPED | OUTPATIENT
Start: 2020-02-13 | End: 2020-02-23

## 2020-02-13 NOTE — TELEPHONE ENCOUNTER
Patient called and stated that her daughter had been diagnosed with the Flu and patient would like to know if you can send a script over for the Tamiflu for her    Please send to [facility] in [address] and call to advise

## 2020-02-13 NOTE — TELEPHONE ENCOUNTER
Rx for Tamiflu 75 mg once a day for 10 days which is a preventative dose sent to the pharmacy    Thank you

## 2020-02-14 DIAGNOSIS — L40.50 PSORIASIS WITH ARTHROPATHY (HCC): ICD-10-CM

## 2020-02-14 RX ORDER — MELOXICAM 15 MG/1
TABLET ORAL
Qty: 30 TABLET | Refills: 0 | OUTPATIENT
Start: 2020-02-14

## 2020-02-17 DIAGNOSIS — F41.1 GENERALIZED ANXIETY DISORDER: ICD-10-CM

## 2020-02-19 RX ORDER — METOPROLOL TARTRATE 50 MG/1
TABLET, FILM COATED ORAL
Qty: 90 TABLET | Refills: 0 | Status: SHIPPED | OUTPATIENT
Start: 2020-02-19 | End: 2020-03-02 | Stop reason: SDUPTHER

## 2020-02-19 NOTE — TELEPHONE ENCOUNTER
Please contact patient or mail her a letter  I have not seen her in the office since September of 2018  Her last office visit with us was in March of 2019  I did send multiple messages about importance of follow-up and regular blood work  I just refilled her blood pressure medication, metoprolol for 90 day supply and no refills  I will not be able to continue refilling any other medications unless patient comes for an office visit/checkup    Thank you

## 2020-03-02 DIAGNOSIS — L40.50 PSORIASIS WITH ARTHROPATHY (HCC): ICD-10-CM

## 2020-03-02 DIAGNOSIS — F41.1 GENERALIZED ANXIETY DISORDER: ICD-10-CM

## 2020-03-03 DIAGNOSIS — K91.2 POSTGASTRECTOMY MALABSORPTION: ICD-10-CM

## 2020-03-03 DIAGNOSIS — E03.9 HYPOTHYROIDISM, UNSPECIFIED TYPE: Primary | ICD-10-CM

## 2020-03-03 DIAGNOSIS — Z90.3 POSTGASTRECTOMY MALABSORPTION: ICD-10-CM

## 2020-03-03 DIAGNOSIS — I10 ESSENTIAL HYPERTENSION: ICD-10-CM

## 2020-03-03 RX ORDER — MELOXICAM 15 MG/1
TABLET ORAL
Qty: 30 TABLET | Refills: 0 | Status: SHIPPED | OUTPATIENT
Start: 2020-03-03 | End: 2020-03-09 | Stop reason: SDUPTHER

## 2020-03-03 RX ORDER — METOPROLOL TARTRATE 50 MG/1
50 TABLET, FILM COATED ORAL DAILY
Qty: 90 TABLET | Refills: 0 | Status: SHIPPED | OUTPATIENT
Start: 2020-03-03 | End: 2020-03-09 | Stop reason: SDUPTHER

## 2020-03-09 ENCOUNTER — TRANSCRIBE ORDERS (OUTPATIENT)
Dept: LAB | Facility: CLINIC | Age: 55
End: 2020-03-09

## 2020-03-09 ENCOUNTER — OFFICE VISIT (OUTPATIENT)
Dept: FAMILY MEDICINE CLINIC | Facility: CLINIC | Age: 55
End: 2020-03-09
Payer: COMMERCIAL

## 2020-03-09 ENCOUNTER — APPOINTMENT (OUTPATIENT)
Dept: LAB | Facility: CLINIC | Age: 55
End: 2020-03-09
Payer: COMMERCIAL

## 2020-03-09 VITALS
HEART RATE: 64 BPM | SYSTOLIC BLOOD PRESSURE: 122 MMHG | HEIGHT: 64 IN | TEMPERATURE: 97.8 F | BODY MASS INDEX: 29.81 KG/M2 | WEIGHT: 174.6 LBS | RESPIRATION RATE: 15 BRPM | DIASTOLIC BLOOD PRESSURE: 78 MMHG | OXYGEN SATURATION: 97 %

## 2020-03-09 DIAGNOSIS — K91.2 POSTGASTRECTOMY MALABSORPTION: ICD-10-CM

## 2020-03-09 DIAGNOSIS — Z12.39 SCREENING FOR BREAST CANCER: ICD-10-CM

## 2020-03-09 DIAGNOSIS — K21.9 CHRONIC GERD: ICD-10-CM

## 2020-03-09 DIAGNOSIS — E03.9 HYPOTHYROIDISM, UNSPECIFIED TYPE: ICD-10-CM

## 2020-03-09 DIAGNOSIS — Z12.11 SCREENING FOR COLON CANCER: ICD-10-CM

## 2020-03-09 DIAGNOSIS — I10 ESSENTIAL HYPERTENSION: ICD-10-CM

## 2020-03-09 DIAGNOSIS — E53.8 VITAMIN B12 DEFICIENCY: ICD-10-CM

## 2020-03-09 DIAGNOSIS — Z90.3 POSTGASTRECTOMY MALABSORPTION: ICD-10-CM

## 2020-03-09 DIAGNOSIS — J32.9 SINUSITIS, UNSPECIFIED CHRONICITY, UNSPECIFIED LOCATION: ICD-10-CM

## 2020-03-09 DIAGNOSIS — L40.50 PSORIASIS WITH ARTHROPATHY (HCC): ICD-10-CM

## 2020-03-09 DIAGNOSIS — J45.40 MODERATE PERSISTENT REACTIVE AIRWAY DISEASE WITHOUT COMPLICATION: ICD-10-CM

## 2020-03-09 DIAGNOSIS — F41.1 GENERALIZED ANXIETY DISORDER: ICD-10-CM

## 2020-03-09 DIAGNOSIS — Z00.00 WELL ADULT EXAM: Primary | ICD-10-CM

## 2020-03-09 LAB
25(OH)D3 SERPL-MCNC: 32.4 NG/ML (ref 30–100)
ALBUMIN SERPL BCP-MCNC: 3.3 G/DL (ref 3.5–5)
ALP SERPL-CCNC: 84 U/L (ref 46–116)
ALT SERPL W P-5'-P-CCNC: 26 U/L (ref 12–78)
ANION GAP SERPL CALCULATED.3IONS-SCNC: 8 MMOL/L (ref 4–13)
AST SERPL W P-5'-P-CCNC: 21 U/L (ref 5–45)
BASOPHILS # BLD AUTO: 0.05 THOUSANDS/ΜL (ref 0–0.1)
BASOPHILS NFR BLD AUTO: 1 % (ref 0–1)
BILIRUB SERPL-MCNC: 0.21 MG/DL (ref 0.2–1)
BUN SERPL-MCNC: 20 MG/DL (ref 5–25)
CALCIUM SERPL-MCNC: 8.6 MG/DL (ref 8.3–10.1)
CHLORIDE SERPL-SCNC: 111 MMOL/L (ref 100–108)
CHOLEST SERPL-MCNC: 202 MG/DL (ref 50–200)
CO2 SERPL-SCNC: 20 MMOL/L (ref 21–32)
CREAT SERPL-MCNC: 1.07 MG/DL (ref 0.6–1.3)
CRP SERPL QL: <3 MG/L
EOSINOPHIL # BLD AUTO: 0.15 THOUSAND/ΜL (ref 0–0.61)
EOSINOPHIL NFR BLD AUTO: 3 % (ref 0–6)
ERYTHROCYTE [DISTWIDTH] IN BLOOD BY AUTOMATED COUNT: 13.7 % (ref 11.6–15.1)
EST. AVERAGE GLUCOSE BLD GHB EST-MCNC: 117 MG/DL
FERRITIN SERPL-MCNC: 5 NG/ML (ref 8–388)
GFR SERPL CREATININE-BSD FRML MDRD: 59 ML/MIN/1.73SQ M
GLUCOSE SERPL-MCNC: 105 MG/DL (ref 65–140)
HBA1C MFR BLD: 5.7 %
HCT VFR BLD AUTO: 35.1 % (ref 34.8–46.1)
HDLC SERPL-MCNC: 76 MG/DL
HGB BLD-MCNC: 10.7 G/DL (ref 11.5–15.4)
IMM GRANULOCYTES # BLD AUTO: 0.02 THOUSAND/UL (ref 0–0.2)
IMM GRANULOCYTES NFR BLD AUTO: 0 % (ref 0–2)
IRON SATN MFR SERPL: 13 %
IRON SERPL-MCNC: 62 UG/DL (ref 50–170)
LDLC SERPL CALC-MCNC: 110 MG/DL (ref 0–100)
LYMPHOCYTES # BLD AUTO: 1.67 THOUSANDS/ΜL (ref 0.6–4.47)
LYMPHOCYTES NFR BLD AUTO: 28 % (ref 14–44)
MCH RBC QN AUTO: 27.7 PG (ref 26.8–34.3)
MCHC RBC AUTO-ENTMCNC: 30.5 G/DL (ref 31.4–37.4)
MCV RBC AUTO: 91 FL (ref 82–98)
MONOCYTES # BLD AUTO: 0.46 THOUSAND/ΜL (ref 0.17–1.22)
MONOCYTES NFR BLD AUTO: 8 % (ref 4–12)
NEUTROPHILS # BLD AUTO: 3.64 THOUSANDS/ΜL (ref 1.85–7.62)
NEUTS SEG NFR BLD AUTO: 60 % (ref 43–75)
NRBC BLD AUTO-RTO: 0 /100 WBCS
PLATELET # BLD AUTO: 352 THOUSANDS/UL (ref 149–390)
PMV BLD AUTO: 9.7 FL (ref 8.9–12.7)
POTASSIUM SERPL-SCNC: 4.1 MMOL/L (ref 3.5–5.3)
PROT SERPL-MCNC: 6.8 G/DL (ref 6.4–8.2)
PTH-INTACT SERPL-MCNC: 80.4 PG/ML (ref 18.4–80.1)
RBC # BLD AUTO: 3.86 MILLION/UL (ref 3.81–5.12)
SODIUM SERPL-SCNC: 139 MMOL/L (ref 136–145)
TIBC SERPL-MCNC: 489 UG/DL (ref 250–450)
TRIGL SERPL-MCNC: 78 MG/DL
TSH SERPL DL<=0.05 MIU/L-ACNC: 0.38 UIU/ML (ref 0.36–3.74)
VIT B12 SERPL-MCNC: 549 PG/ML (ref 100–900)
WBC # BLD AUTO: 5.99 THOUSAND/UL (ref 4.31–10.16)

## 2020-03-09 PROCEDURE — 82306 VITAMIN D 25 HYDROXY: CPT

## 2020-03-09 PROCEDURE — 83970 ASSAY OF PARATHORMONE: CPT

## 2020-03-09 PROCEDURE — 80053 COMPREHEN METABOLIC PANEL: CPT

## 2020-03-09 PROCEDURE — 83540 ASSAY OF IRON: CPT

## 2020-03-09 PROCEDURE — 99396 PREV VISIT EST AGE 40-64: CPT | Performed by: FAMILY MEDICINE

## 2020-03-09 PROCEDURE — 84425 ASSAY OF VITAMIN B-1: CPT

## 2020-03-09 PROCEDURE — 82728 ASSAY OF FERRITIN: CPT

## 2020-03-09 PROCEDURE — 83036 HEMOGLOBIN GLYCOSYLATED A1C: CPT

## 2020-03-09 PROCEDURE — 86140 C-REACTIVE PROTEIN: CPT

## 2020-03-09 PROCEDURE — 82607 VITAMIN B-12: CPT

## 2020-03-09 PROCEDURE — 80061 LIPID PANEL: CPT

## 2020-03-09 PROCEDURE — 83550 IRON BINDING TEST: CPT

## 2020-03-09 PROCEDURE — 85025 COMPLETE CBC W/AUTO DIFF WBC: CPT

## 2020-03-09 PROCEDURE — 36415 COLL VENOUS BLD VENIPUNCTURE: CPT

## 2020-03-09 PROCEDURE — 84443 ASSAY THYROID STIM HORMONE: CPT

## 2020-03-09 RX ORDER — PANTOPRAZOLE SODIUM 40 MG/1
40 TABLET, DELAYED RELEASE ORAL
Qty: 90 TABLET | Refills: 3 | Status: SHIPPED | OUTPATIENT
Start: 2020-03-09 | End: 2021-03-17

## 2020-03-09 RX ORDER — MELOXICAM 15 MG/1
TABLET ORAL
Qty: 90 TABLET | Refills: 1 | Status: SHIPPED | OUTPATIENT
Start: 2020-03-09 | End: 2020-09-17

## 2020-03-09 RX ORDER — METOPROLOL TARTRATE 50 MG/1
50 TABLET, FILM COATED ORAL DAILY
Qty: 90 TABLET | Refills: 3 | Status: SHIPPED | OUTPATIENT
Start: 2020-03-09 | End: 2020-06-17 | Stop reason: SDUPTHER

## 2020-03-09 RX ORDER — MONTELUKAST SODIUM 10 MG/1
10 TABLET ORAL
Qty: 90 TABLET | Refills: 2 | Status: SHIPPED | OUTPATIENT
Start: 2020-03-09 | End: 2020-12-19

## 2020-03-09 RX ORDER — FAMOTIDINE 40 MG/1
40 TABLET, FILM COATED ORAL
Qty: 90 TABLET | Refills: 1 | Status: SHIPPED | OUTPATIENT
Start: 2020-03-09 | End: 2020-09-02

## 2020-03-09 RX ORDER — LEVALBUTEROL TARTRATE 45 UG/1
2 AEROSOL, METERED ORAL EVERY 6 HOURS PRN
Qty: 1 INHALER | Refills: 3 | Status: SHIPPED | OUTPATIENT
Start: 2020-03-09

## 2020-03-09 RX ORDER — MOMETASONE FUROATE 50 UG/1
2 SPRAY, METERED NASAL DAILY
Qty: 51 ACT | Refills: 3 | Status: SHIPPED | OUTPATIENT
Start: 2020-03-09 | End: 2021-03-02

## 2020-03-09 RX ORDER — BUDESONIDE AND FORMOTEROL FUMARATE DIHYDRATE 160; 4.5 UG/1; UG/1
2 AEROSOL RESPIRATORY (INHALATION) 2 TIMES DAILY
Qty: 1 INHALER | Refills: 1 | Status: SHIPPED | OUTPATIENT
Start: 2020-03-09 | End: 2020-05-04

## 2020-03-09 RX ORDER — CYANOCOBALAMIN 1000 UG/ML
INJECTION INTRAMUSCULAR; SUBCUTANEOUS
Qty: 10 ML | Refills: 3 | Status: SHIPPED | OUTPATIENT
Start: 2020-03-09

## 2020-03-13 ENCOUNTER — TELEPHONE (OUTPATIENT)
Dept: FAMILY MEDICINE CLINIC | Facility: CLINIC | Age: 55
End: 2020-03-13

## 2020-03-13 DIAGNOSIS — G89.4 PAIN SYNDROME, CHRONIC: ICD-10-CM

## 2020-03-13 DIAGNOSIS — Z12.11 SCREENING FOR COLON CANCER: ICD-10-CM

## 2020-03-13 DIAGNOSIS — E03.9 HYPOTHYROIDISM, UNSPECIFIED TYPE: ICD-10-CM

## 2020-03-13 DIAGNOSIS — D50.8 OTHER IRON DEFICIENCY ANEMIA: Primary | ICD-10-CM

## 2020-03-13 LAB — VIT B1 BLD-SCNC: 132.1 NMOL/L (ref 66.5–200)

## 2020-03-13 NOTE — TELEPHONE ENCOUNTER
I reviewed blood work results  Hemoglobin 10 7, ferritin level is 5  Rest of the blood work is essentially unremarkable  The left message for patient to call me back to discuss

## 2020-03-17 PROBLEM — D50.9 IRON DEFICIENCY ANEMIA, UNSPECIFIED: Status: ACTIVE | Noted: 2020-03-17

## 2020-03-17 NOTE — TELEPHONE ENCOUNTER
I spoke with patient regarding blood work results  Decreased hemoglobin of 10 7 with very low level of ferritin and high TIBC  Patient with history of gastric bypass  She is not up-to-date with upper and lower endoscopies  Plan:  Referral to [facility], patient needs EGD and colonoscopy, will cancel Cologuard orders due to anemia  Referral to Hematology, patient would likely benefit from iron infusions  Patient understands and agrees    Will repeat blood work in 3 months

## 2020-03-19 ENCOUNTER — TELEPHONE (OUTPATIENT)
Dept: HEMATOLOGY ONCOLOGY | Facility: CLINIC | Age: 55
End: 2020-03-19

## 2020-03-19 NOTE — TELEPHONE ENCOUNTER
New Patient Encounter    New Patient Intake Form   Patient Details:  Iqra Guerra  1965  [id number]    Background Information:  [address] starts by opening a telephone encounter and gathering the following information   Who is calling to schedule? If not self, relationship to patient? Self   Referring Provider Dr Alma Katz   What is the diagnosis? Other iron deficiency anemia   Is this diagnosis confirmed? Yes   When was the diagnosis? unknown   Is there a confirmed diagnosis from a biopsy/tissue reviewed by pathology? Is patient aware of diagnosis? yes   Is there a personal history of cancer and what kind? no   Is there a family history of cancer and what kind? Father - Yost Ping - 62   Reason for visit? New Diagnosis   Have you had any imaging or labs done? If so: when, [facility]? [facility]   Are records in [email or web address] [facility])? yes   Was the patient told to bring a disk? no   Does the patient smoke or Vape? no   If yes, how many packs or cartridges per day? N/A   Scheduling Information:   Preferred [place]: [facility]     Requesting Specific Provider? any   Are there any dates/time the patient cannot be seen? any   Miscellaneous:    After completing the above information, please route to Financial Counselor and the appropriate Nurse Navigator for review

## 2020-03-21 DIAGNOSIS — E03.9 HYPOTHYROIDISM, UNSPECIFIED TYPE: ICD-10-CM

## 2020-03-21 RX ORDER — LEVOTHYROXINE SODIUM 112 UG/1
TABLET ORAL
Qty: 90 TABLET | Refills: 1 | Status: SHIPPED | OUTPATIENT
Start: 2020-03-21 | End: 2020-11-13

## 2020-04-20 ENCOUNTER — TELEPHONE (OUTPATIENT)
Dept: FAMILY MEDICINE CLINIC | Facility: CLINIC | Age: 55
End: 2020-04-20

## 2020-04-20 RX ORDER — DEXTROAMPHETAMINE SACCHARATE, AMPHETAMINE ASPARTATE, DEXTROAMPHETAMINE SULFATE AND AMPHETAMINE SULFATE 3.75; 3.75; 3.75; 3.75 MG/1; MG/1; MG/1; MG/1
1 TABLET ORAL DAILY PRN
COMMUNITY
Start: 2020-04-01

## 2020-04-23 ENCOUNTER — TELEPHONE (OUTPATIENT)
Dept: HEMATOLOGY ONCOLOGY | Facility: CLINIC | Age: 55
End: 2020-04-23

## 2020-04-24 ENCOUNTER — TELEPHONE (OUTPATIENT)
Dept: HEMATOLOGY ONCOLOGY | Facility: CLINIC | Age: 55
End: 2020-04-24

## 2020-04-24 ENCOUNTER — CONSULT (OUTPATIENT)
Dept: HEMATOLOGY ONCOLOGY | Facility: CLINIC | Age: 55
End: 2020-04-24
Payer: COMMERCIAL

## 2020-04-24 VITALS
OXYGEN SATURATION: 99 % | RESPIRATION RATE: 16 BRPM | WEIGHT: 181.4 LBS | DIASTOLIC BLOOD PRESSURE: 80 MMHG | SYSTOLIC BLOOD PRESSURE: 120 MMHG | BODY MASS INDEX: 30.97 KG/M2 | HEIGHT: 64 IN | HEART RATE: 70 BPM | TEMPERATURE: 96.4 F

## 2020-04-24 DIAGNOSIS — Z90.3 POSTGASTRECTOMY MALABSORPTION: ICD-10-CM

## 2020-04-24 DIAGNOSIS — Z92.241 HISTORY OF STEROID THERAPY: ICD-10-CM

## 2020-04-24 DIAGNOSIS — E53.8 VITAMIN B12 DEFICIENCY: Primary | ICD-10-CM

## 2020-04-24 DIAGNOSIS — K91.2 POSTGASTRECTOMY MALABSORPTION: ICD-10-CM

## 2020-04-24 DIAGNOSIS — D50.8 OTHER IRON DEFICIENCY ANEMIA: ICD-10-CM

## 2020-04-24 PROBLEM — Z00.00 HEALTH CARE MAINTENANCE: Status: ACTIVE | Noted: 2020-04-24

## 2020-04-24 PROCEDURE — 3074F SYST BP LT 130 MM HG: CPT | Performed by: PHYSICIAN ASSISTANT

## 2020-04-24 PROCEDURE — 99243 OFF/OP CNSLTJ NEW/EST LOW 30: CPT | Performed by: PHYSICIAN ASSISTANT

## 2020-04-24 PROCEDURE — 3008F BODY MASS INDEX DOCD: CPT | Performed by: NURSE PRACTITIONER

## 2020-04-24 PROCEDURE — 3079F DIAST BP 80-89 MM HG: CPT | Performed by: PHYSICIAN ASSISTANT

## 2020-04-24 RX ORDER — SODIUM CHLORIDE 9 MG/ML
20 INJECTION, SOLUTION INTRAVENOUS ONCE
Status: CANCELLED | OUTPATIENT
Start: 2020-04-29

## 2020-04-29 ENCOUNTER — HOSPITAL ENCOUNTER (OUTPATIENT)
Dept: INFUSION CENTER | Facility: HOSPITAL | Age: 55
Discharge: HOME | End: 2020-04-29
Attending: INTERNAL MEDICINE
Payer: COMMERCIAL

## 2020-04-29 VITALS
HEART RATE: 68 BPM | DIASTOLIC BLOOD PRESSURE: 57 MMHG | BODY MASS INDEX: 30.86 KG/M2 | HEIGHT: 64 IN | RESPIRATION RATE: 18 BRPM | TEMPERATURE: 97.4 F | SYSTOLIC BLOOD PRESSURE: 110 MMHG | WEIGHT: 180.78 LBS

## 2020-04-29 DIAGNOSIS — D50.8 OTHER IRON DEFICIENCY ANEMIA: Primary | ICD-10-CM

## 2020-04-29 PROCEDURE — 96366 THER/PROPH/DIAG IV INF ADDON: CPT

## 2020-04-29 PROCEDURE — 96365 THER/PROPH/DIAG IV INF INIT: CPT

## 2020-04-29 RX ORDER — SODIUM CHLORIDE 9 MG/ML
20 INJECTION, SOLUTION INTRAVENOUS ONCE
Status: COMPLETED | OUTPATIENT
Start: 2020-04-29 | End: 2020-04-29

## 2020-04-29 RX ORDER — SODIUM CHLORIDE 9 MG/ML
20 INJECTION, SOLUTION INTRAVENOUS ONCE
Status: CANCELLED | OUTPATIENT
Start: 2020-05-06

## 2020-04-29 RX ADMIN — IRON SUCROSE 300 MG: 20 INJECTION, SOLUTION INTRAVENOUS at 10:51

## 2020-04-29 RX ADMIN — SODIUM CHLORIDE 20 ML/HR: 0.9 INJECTION, SOLUTION INTRAVENOUS at 10:51

## 2020-05-04 DIAGNOSIS — J45.40 MODERATE PERSISTENT REACTIVE AIRWAY DISEASE WITHOUT COMPLICATION: ICD-10-CM

## 2020-05-04 RX ORDER — BUDESONIDE AND FORMOTEROL FUMARATE DIHYDRATE 160; 4.5 UG/1; UG/1
AEROSOL RESPIRATORY (INHALATION)
Qty: 10.2 INHALER | Refills: 3 | Status: SHIPPED | OUTPATIENT
Start: 2020-05-04 | End: 2020-05-30

## 2020-05-05 RX ORDER — SODIUM CHLORIDE 9 MG/ML
20 INJECTION, SOLUTION INTRAVENOUS ONCE
Status: CANCELLED | OUTPATIENT
Start: 2020-05-09

## 2020-05-09 ENCOUNTER — HOSPITAL ENCOUNTER (OUTPATIENT)
Dept: INFUSION CENTER | Facility: HOSPITAL | Age: 55
Discharge: HOME | End: 2020-05-09
Attending: INTERNAL MEDICINE
Payer: COMMERCIAL

## 2020-05-09 VITALS
SYSTOLIC BLOOD PRESSURE: 131 MMHG | HEART RATE: 66 BPM | TEMPERATURE: 97.5 F | DIASTOLIC BLOOD PRESSURE: 66 MMHG | RESPIRATION RATE: 18 BRPM

## 2020-05-09 DIAGNOSIS — D50.8 OTHER IRON DEFICIENCY ANEMIA: Primary | ICD-10-CM

## 2020-05-09 PROCEDURE — 96366 THER/PROPH/DIAG IV INF ADDON: CPT

## 2020-05-09 PROCEDURE — 96365 THER/PROPH/DIAG IV INF INIT: CPT

## 2020-05-09 RX ORDER — SODIUM CHLORIDE 9 MG/ML
20 INJECTION, SOLUTION INTRAVENOUS ONCE
Status: CANCELLED | OUTPATIENT
Start: 2020-05-16

## 2020-05-09 RX ORDER — SODIUM CHLORIDE 9 MG/ML
20 INJECTION, SOLUTION INTRAVENOUS ONCE
Status: COMPLETED | OUTPATIENT
Start: 2020-05-09 | End: 2020-05-09

## 2020-05-09 RX ADMIN — SODIUM CHLORIDE 20 ML/HR: 0.9 INJECTION, SOLUTION INTRAVENOUS at 12:23

## 2020-05-09 RX ADMIN — IRON SUCROSE 300 MG: 20 INJECTION, SOLUTION INTRAVENOUS at 12:23

## 2020-05-14 ENCOUNTER — TELEPHONE (OUTPATIENT)
Dept: FAMILY MEDICINE CLINIC | Facility: CLINIC | Age: 55
End: 2020-05-14

## 2020-05-14 ENCOUNTER — TELEPHONE (OUTPATIENT)
Dept: OTHER | Facility: OTHER | Age: 55
End: 2020-05-14

## 2020-05-15 ENCOUNTER — TELEMEDICINE (OUTPATIENT)
Dept: FAMILY MEDICINE CLINIC | Facility: CLINIC | Age: 55
End: 2020-05-15
Payer: COMMERCIAL

## 2020-05-15 DIAGNOSIS — Z20.828 EXPOSURE TO SARS-ASSOCIATED CORONAVIRUS: ICD-10-CM

## 2020-05-15 DIAGNOSIS — J01.91 ACUTE RECURRENT SINUSITIS, UNSPECIFIED LOCATION: Primary | ICD-10-CM

## 2020-05-15 PROCEDURE — 99214 OFFICE O/P EST MOD 30 MIN: CPT | Performed by: FAMILY MEDICINE

## 2020-05-15 PROCEDURE — U0003 INFECTIOUS AGENT DETECTION BY NUCLEIC ACID (DNA OR RNA); SEVERE ACUTE RESPIRATORY SYNDROME CORONAVIRUS 2 (SARS-COV-2) (CORONAVIRUS DISEASE [COVID-19]), AMPLIFIED PROBE TECHNIQUE, MAKING USE OF HIGH THROUGHPUT TECHNOLOGIES AS DESCRIBED BY CMS-2020-01-R: HCPCS | Performed by: FAMILY MEDICINE

## 2020-05-15 RX ORDER — AMOXICILLIN AND CLAVULANATE POTASSIUM 875; 125 MG/1; MG/1
1 TABLET, FILM COATED ORAL 2 TIMES DAILY WITH MEALS
Qty: 20 TABLET | Refills: 0 | Status: SHIPPED | OUTPATIENT
Start: 2020-05-15 | End: 2020-05-25

## 2020-05-16 ENCOUNTER — HOSPITAL ENCOUNTER (OUTPATIENT)
Dept: INFUSION CENTER | Facility: HOSPITAL | Age: 55
Discharge: HOME | End: 2020-05-16
Attending: INTERNAL MEDICINE
Payer: COMMERCIAL

## 2020-05-16 DIAGNOSIS — D50.8 OTHER IRON DEFICIENCY ANEMIA: Primary | ICD-10-CM

## 2020-05-16 PROCEDURE — 96366 THER/PROPH/DIAG IV INF ADDON: CPT

## 2020-05-16 PROCEDURE — 96365 THER/PROPH/DIAG IV INF INIT: CPT

## 2020-05-16 RX ORDER — SODIUM CHLORIDE 9 MG/ML
20 INJECTION, SOLUTION INTRAVENOUS ONCE
Status: COMPLETED | OUTPATIENT
Start: 2020-05-16 | End: 2020-05-16

## 2020-05-16 RX ORDER — SODIUM CHLORIDE 9 MG/ML
20 INJECTION, SOLUTION INTRAVENOUS ONCE
Status: CANCELLED | OUTPATIENT
Start: 2020-05-23

## 2020-05-16 RX ADMIN — SODIUM CHLORIDE 20 ML/HR: 0.9 INJECTION, SOLUTION INTRAVENOUS at 11:40

## 2020-05-16 RX ADMIN — IRON SUCROSE 300 MG: 20 INJECTION, SOLUTION INTRAVENOUS at 11:40

## 2020-05-17 LAB — SARS-COV-2 RNA SPEC QL NAA+PROBE: NOT DETECTED

## 2020-05-23 ENCOUNTER — HOSPITAL ENCOUNTER (OUTPATIENT)
Dept: INFUSION CENTER | Facility: HOSPITAL | Age: 55
Discharge: HOME | End: 2020-05-23
Attending: INTERNAL MEDICINE
Payer: COMMERCIAL

## 2020-05-23 VITALS
DIASTOLIC BLOOD PRESSURE: 61 MMHG | TEMPERATURE: 96.1 F | RESPIRATION RATE: 18 BRPM | HEART RATE: 72 BPM | SYSTOLIC BLOOD PRESSURE: 131 MMHG

## 2020-05-23 DIAGNOSIS — D50.8 OTHER IRON DEFICIENCY ANEMIA: Primary | ICD-10-CM

## 2020-05-23 PROCEDURE — 96365 THER/PROPH/DIAG IV INF INIT: CPT

## 2020-05-23 PROCEDURE — 96366 THER/PROPH/DIAG IV INF ADDON: CPT

## 2020-05-23 RX ORDER — SODIUM CHLORIDE 9 MG/ML
20 INJECTION, SOLUTION INTRAVENOUS ONCE
Status: COMPLETED | OUTPATIENT
Start: 2020-05-23 | End: 2020-05-23

## 2020-05-23 RX ORDER — SODIUM CHLORIDE 9 MG/ML
20 INJECTION, SOLUTION INTRAVENOUS ONCE
Status: CANCELLED | OUTPATIENT
Start: 2020-05-30

## 2020-05-23 RX ADMIN — SODIUM CHLORIDE 20 ML/HR: 0.9 INJECTION, SOLUTION INTRAVENOUS at 12:23

## 2020-05-23 RX ADMIN — IRON SUCROSE 300 MG: 20 INJECTION, SOLUTION INTRAVENOUS at 12:24

## 2020-05-29 DIAGNOSIS — J45.40 MODERATE PERSISTENT REACTIVE AIRWAY DISEASE WITHOUT COMPLICATION: ICD-10-CM

## 2020-05-30 ENCOUNTER — HOSPITAL ENCOUNTER (OUTPATIENT)
Dept: INFUSION CENTER | Facility: HOSPITAL | Age: 55
Discharge: HOME | End: 2020-05-30
Attending: INTERNAL MEDICINE
Payer: COMMERCIAL

## 2020-05-30 VITALS — DIASTOLIC BLOOD PRESSURE: 53 MMHG | HEART RATE: 80 BPM | TEMPERATURE: 97 F | SYSTOLIC BLOOD PRESSURE: 126 MMHG

## 2020-05-30 DIAGNOSIS — D50.8 OTHER IRON DEFICIENCY ANEMIA: Primary | ICD-10-CM

## 2020-05-30 PROCEDURE — 96366 THER/PROPH/DIAG IV INF ADDON: CPT

## 2020-05-30 PROCEDURE — 96365 THER/PROPH/DIAG IV INF INIT: CPT

## 2020-05-30 RX ORDER — SODIUM CHLORIDE 9 MG/ML
20 INJECTION, SOLUTION INTRAVENOUS ONCE
Status: COMPLETED | OUTPATIENT
Start: 2020-05-30 | End: 2020-05-30

## 2020-05-30 RX ORDER — BUDESONIDE AND FORMOTEROL FUMARATE DIHYDRATE 160; 4.5 UG/1; UG/1
AEROSOL RESPIRATORY (INHALATION)
Qty: 3 INHALER | Refills: 2 | Status: SHIPPED | OUTPATIENT
Start: 2020-05-30

## 2020-05-30 RX ORDER — SODIUM CHLORIDE 9 MG/ML
20 INJECTION, SOLUTION INTRAVENOUS ONCE
Status: CANCELLED | OUTPATIENT
Start: 2020-05-30

## 2020-05-30 RX ADMIN — SODIUM CHLORIDE 20 ML/HR: 0.9 INJECTION, SOLUTION INTRAVENOUS at 12:20

## 2020-05-30 RX ADMIN — IRON SUCROSE 300 MG: 20 INJECTION, SOLUTION INTRAVENOUS at 12:19

## 2020-06-17 ENCOUNTER — TELEPHONE (OUTPATIENT)
Dept: FAMILY MEDICINE CLINIC | Facility: CLINIC | Age: 55
End: 2020-06-17

## 2020-06-17 DIAGNOSIS — F41.1 GENERALIZED ANXIETY DISORDER: ICD-10-CM

## 2020-06-17 DIAGNOSIS — J32.9 SINUSITIS, UNSPECIFIED CHRONICITY, UNSPECIFIED LOCATION: Primary | ICD-10-CM

## 2020-06-17 RX ORDER — DOXYCYCLINE HYCLATE 100 MG
100 TABLET ORAL 2 TIMES DAILY
Qty: 20 TABLET | Refills: 0 | Status: SHIPPED | OUTPATIENT
Start: 2020-06-17 | End: 2020-06-27

## 2020-06-18 RX ORDER — METOPROLOL TARTRATE 50 MG/1
50 TABLET, FILM COATED ORAL DAILY
Qty: 90 TABLET | Refills: 3 | Status: SHIPPED | OUTPATIENT
Start: 2020-06-18 | End: 2020-07-07 | Stop reason: SDUPTHER

## 2020-06-19 RX ORDER — METOPROLOL TARTRATE 50 MG/1
50 TABLET, FILM COATED ORAL DAILY
Qty: 90 TABLET | Refills: 0 | OUTPATIENT
Start: 2020-06-19

## 2020-07-01 ENCOUNTER — APPOINTMENT (OUTPATIENT)
Dept: LAB | Facility: CLINIC | Age: 55
End: 2020-07-01
Payer: COMMERCIAL

## 2020-07-01 DIAGNOSIS — Z12.11 SCREENING FOR COLON CANCER: ICD-10-CM

## 2020-07-01 DIAGNOSIS — L40.50 PSORIASIS WITH ARTHROPATHY (HCC): ICD-10-CM

## 2020-07-01 DIAGNOSIS — G89.4 PAIN SYNDROME, CHRONIC: ICD-10-CM

## 2020-07-01 DIAGNOSIS — E03.9 HYPOTHYROIDISM, UNSPECIFIED TYPE: ICD-10-CM

## 2020-07-01 LAB
ALBUMIN SERPL BCP-MCNC: 3.4 G/DL (ref 3.5–5)
ALP SERPL-CCNC: 85 U/L (ref 46–116)
ALT SERPL W P-5'-P-CCNC: 48 U/L (ref 12–78)
ANION GAP SERPL CALCULATED.3IONS-SCNC: 6 MMOL/L (ref 4–13)
AST SERPL W P-5'-P-CCNC: 31 U/L (ref 5–45)
BILIRUB SERPL-MCNC: 0.28 MG/DL (ref 0.2–1)
BUN SERPL-MCNC: 19 MG/DL (ref 5–25)
CALCIUM SERPL-MCNC: 8.5 MG/DL (ref 8.3–10.1)
CHLORIDE SERPL-SCNC: 103 MMOL/L (ref 100–108)
CHOLEST SERPL-MCNC: 208 MG/DL (ref 50–200)
CO2 SERPL-SCNC: 29 MMOL/L (ref 21–32)
CREAT SERPL-MCNC: 0.93 MG/DL (ref 0.6–1.3)
ERYTHROCYTE [DISTWIDTH] IN BLOOD BY AUTOMATED COUNT: 13.1 % (ref 11.6–15.1)
ERYTHROCYTE [SEDIMENTATION RATE] IN BLOOD: 17 MM/HOUR (ref 0–20)
FERRITIN SERPL-MCNC: 176 NG/ML (ref 8–388)
GFR SERPL CREATININE-BSD FRML MDRD: 70 ML/MIN/1.73SQ M
GLUCOSE P FAST SERPL-MCNC: 108 MG/DL (ref 65–99)
HCT VFR BLD AUTO: 37.4 % (ref 34.8–46.1)
HDLC SERPL-MCNC: 70 MG/DL
HGB BLD-MCNC: 11.7 G/DL (ref 11.5–15.4)
IRON SATN MFR SERPL: 40 %
IRON SERPL-MCNC: 97 UG/DL (ref 50–170)
LDLC SERPL CALC-MCNC: 117 MG/DL (ref 0–100)
MCH RBC QN AUTO: 30.6 PG (ref 26.8–34.3)
MCHC RBC AUTO-ENTMCNC: 31.3 G/DL (ref 31.4–37.4)
MCV RBC AUTO: 98 FL (ref 82–98)
PLATELET # BLD AUTO: 306 THOUSANDS/UL (ref 149–390)
PMV BLD AUTO: 10.4 FL (ref 8.9–12.7)
POTASSIUM SERPL-SCNC: 3.9 MMOL/L (ref 3.5–5.3)
PROT SERPL-MCNC: 6.7 G/DL (ref 6.4–8.2)
PTH-INTACT SERPL-MCNC: 86.5 PG/ML (ref 18.4–80.1)
RBC # BLD AUTO: 3.82 MILLION/UL (ref 3.81–5.12)
SODIUM SERPL-SCNC: 138 MMOL/L (ref 136–145)
TIBC SERPL-MCNC: 240 UG/DL (ref 250–450)
TRIGL SERPL-MCNC: 103 MG/DL
TSH SERPL DL<=0.05 MIU/L-ACNC: 0.2 UIU/ML (ref 0.36–3.74)
WBC # BLD AUTO: 6.08 THOUSAND/UL (ref 4.31–10.16)

## 2020-07-01 PROCEDURE — 82728 ASSAY OF FERRITIN: CPT

## 2020-07-01 PROCEDURE — 83970 ASSAY OF PARATHORMONE: CPT

## 2020-07-01 PROCEDURE — 36415 COLL VENOUS BLD VENIPUNCTURE: CPT

## 2020-07-01 PROCEDURE — 80061 LIPID PANEL: CPT

## 2020-07-01 PROCEDURE — 84443 ASSAY THYROID STIM HORMONE: CPT

## 2020-07-01 PROCEDURE — 83550 IRON BINDING TEST: CPT

## 2020-07-01 PROCEDURE — 84439 ASSAY OF FREE THYROXINE: CPT

## 2020-07-01 PROCEDURE — 83540 ASSAY OF IRON: CPT

## 2020-07-01 PROCEDURE — 80053 COMPREHEN METABOLIC PANEL: CPT

## 2020-07-01 PROCEDURE — 85652 RBC SED RATE AUTOMATED: CPT

## 2020-07-01 PROCEDURE — 85027 COMPLETE CBC AUTOMATED: CPT

## 2020-07-02 ENCOUNTER — APPOINTMENT (OUTPATIENT)
Dept: LAB | Facility: CLINIC | Age: 55
End: 2020-07-02
Payer: COMMERCIAL

## 2020-07-02 ENCOUNTER — TRANSCRIBE ORDERS (OUTPATIENT)
Dept: LAB | Facility: HOSPITAL | Age: 55
End: 2020-07-02

## 2020-07-02 DIAGNOSIS — E03.9 HYPOTHYROIDISM, UNSPECIFIED TYPE: Primary | ICD-10-CM

## 2020-07-02 DIAGNOSIS — K91.1 POST GASTRECTOMY SYNDROME: ICD-10-CM

## 2020-07-02 DIAGNOSIS — K91.1 POST GASTRECTOMY SYNDROME: Primary | ICD-10-CM

## 2020-07-02 LAB — T4 FREE SERPL-MCNC: 1.11 NG/DL (ref 0.76–1.46)

## 2020-07-02 PROCEDURE — 36415 COLL VENOUS BLD VENIPUNCTURE: CPT

## 2020-07-07 ENCOUNTER — TELEMEDICINE (OUTPATIENT)
Dept: FAMILY MEDICINE CLINIC | Facility: CLINIC | Age: 55
End: 2020-07-07
Payer: COMMERCIAL

## 2020-07-07 DIAGNOSIS — M54.2 ANTERIOR NECK PAIN: ICD-10-CM

## 2020-07-07 DIAGNOSIS — J45.40 MODERATE PERSISTENT REACTIVE AIRWAY DISEASE WITHOUT COMPLICATION: ICD-10-CM

## 2020-07-07 DIAGNOSIS — Z90.3 POSTGASTRECTOMY MALABSORPTION: ICD-10-CM

## 2020-07-07 DIAGNOSIS — E03.9 HYPOTHYROIDISM, UNSPECIFIED TYPE: ICD-10-CM

## 2020-07-07 DIAGNOSIS — L40.50 PSORIASIS WITH ARTHROPATHY (HCC): ICD-10-CM

## 2020-07-07 DIAGNOSIS — F41.1 GENERALIZED ANXIETY DISORDER: ICD-10-CM

## 2020-07-07 DIAGNOSIS — K91.2 POSTGASTRECTOMY MALABSORPTION: ICD-10-CM

## 2020-07-07 DIAGNOSIS — D50.8 OTHER IRON DEFICIENCY ANEMIA: ICD-10-CM

## 2020-07-07 DIAGNOSIS — N60.19 FIBROCYSTIC BREAST DISEASE (FCBD), UNSPECIFIED LATERALITY: Primary | ICD-10-CM

## 2020-07-07 DIAGNOSIS — K21.9 CHRONIC GERD: ICD-10-CM

## 2020-07-07 PROBLEM — Z92.241 HISTORY OF STEROID THERAPY: Status: RESOLVED | Noted: 2020-04-24 | Resolved: 2020-07-07

## 2020-07-07 PROCEDURE — 99214 OFFICE O/P EST MOD 30 MIN: CPT | Performed by: FAMILY MEDICINE

## 2020-07-07 PROCEDURE — 3074F SYST BP LT 130 MM HG: CPT | Performed by: FAMILY MEDICINE

## 2020-07-07 PROCEDURE — 1036F TOBACCO NON-USER: CPT | Performed by: FAMILY MEDICINE

## 2020-07-07 PROCEDURE — 3078F DIAST BP <80 MM HG: CPT | Performed by: FAMILY MEDICINE

## 2020-07-07 RX ORDER — METOPROLOL TARTRATE 50 MG/1
TABLET, FILM COATED ORAL
Qty: 180 TABLET | Refills: 3 | Status: SHIPPED | OUTPATIENT
Start: 2020-07-07 | End: 2021-07-11

## 2020-07-07 RX ORDER — PREDNISONE 10 MG/1
TABLET ORAL
Qty: 22 TABLET | Refills: 0 | Status: SHIPPED | OUTPATIENT
Start: 2020-07-07 | End: 2020-07-17 | Stop reason: SDUPTHER

## 2020-07-07 NOTE — PROGRESS NOTES
Virtual Regular Visit      Assessment/Plan:    Problem List Items Addressed This Visit        Digestive    Chronic GERD    Relevant Orders    Ambulatory Referral to Otolaryngology    Postgastrectomy malabsorption    Relevant Orders    CBC    Iron, TIBC and Ferritin Panel    T4, free    TSH, 3rd generation    Basic metabolic panel    Calcium, ionized    PTH, intact       Endocrine    Hypothyroidism    Relevant Medications    metoprolol tartrate (LOPRESSOR) 50 mg tablet    predniSONE 10 mg tablet       Respiratory    Reactive airway disease    Relevant Medications    predniSONE 10 mg tablet       Musculoskeletal and Integument    Psoriasis with arthropathy (HCC)     · Worsening of myalgias and arthralgias  · Continue meloxicam 15 mg daily p r n  Beth Prows · Patient will start ADA steroid taper to alleviate her symptoms  · She has declined further follow-up with Rheumatology on multiple occasions         Relevant Medications    predniSONE 10 mg tablet       Other    Generalized anxiety disorder    Relevant Medications    metoprolol tartrate (LOPRESSOR) 50 mg tablet    Iron deficiency anemia, unspecified      Other Visit Diagnoses     Fibrocystic breast disease (FCBD), unspecified laterality    -  Primary    Relevant Orders    Mammo diagnostic bilateral w 3d & cad    Anterior neck pain        Relevant Orders    Ambulatory Referral to Otolaryngology      Patient presents for follow-up of chronic medical conditions  Chronic symptoms of fatigue, likely multifactorial   Patient was recently diagnosed and treated for iron deficiency anemia, status post Venofer infusions x5  Pending colonoscopy an EGD  Her hemoglobin has improved from 10 7-11 7 with significant improvement of ferritin level from 5-176  Will continue close observation  Patient will repeat blood work in 2 months  No indication for iron infusions at present time  Results of recent blood work reviewed     Mild elevation of PTH , previously normal vitamin-D level    I advised patient to increase dose of Caltrate with vitamin-D from 1 tablet daily to 2 tablets daily  Will continue close monitoring  Current PTH is 85 and calcium level is 8 5  Hypertension, continue metoprolol, patient noticed improvement on slightly high dose of 50 mg b i d     Intermittent symptoms of anterior neck discomfort described as subjective swelling  Previous thyroid ultrasound revealed atrophic gland with no dominant nodules  Patient with chronic symptoms of reflux  She has Protonix on hand but uses medication on an as-needed basis only  I advised patient to proceed with repeat thyroid ultrasound, schedule evaluation with ENT and start using Protonix on a daily basis  Patient reports worsening of generalized myalgias and arthralgias along with exacerbation of asthma and allergy symptoms  She was recently treated for 2 sinus infections and reports increased use of rescue inhaler lately  I will try her on prednisone taper at 40 mg daily for 2 days then 30 mg daily for 2 days then 20 mg daily for 2 days then 10 mg daily for 2 days  Patient will contact me if symptoms are not improving  Health maintenance:  Patient is past due mammogram   History of fibrocystic breast disease    I strongly advised her to schedule diagnostic mammogram       Schedule mammogram Ace titus, history of fibrocystic, past due 3 months  Dr Jadyn Tellez for symptoms of intermittent neck is well in and sore throat, concerned about silent reflux, start pantoprazole daily         Reason for visit is   Chief Complaint   Patient presents with   Nadya ARTIS/DELFINA for Pt to C/B with Vitals    Virtual Regular Visit        Encounter provider Garima Whitney MD    Provider located at [address] [place] [id number]      Recent Visits  Date Type Provider Dept   07/07/20 Telemedicine Garima Whitney MD [facility] recent visits within past 7 days and meeting all other requirements     Future Appointments  No visits were found meeting these conditions  Showing future appointments within next 150 days and meeting all other requirements        The patient was identified by name and date of birth  Severiano Later was informed that this is a telemedicine visit and that the visit is being conducted through [facility] and patient was informed that this is not a secure, HIPAA-complaint platform  She agrees to proceed     My office door was closed  No one else was in the room  She acknowledged consent and understanding of privacy and security of the video platform  The patient has agreed to participate and understands they can discontinue the visit at any time  Patient is aware this is a billable service  Santy Braun is a 48 y o  female who presents for follow-up of chronic medical conditions   Patient presents for follow-up chronic medical conditions  We are following up on symptoms of chronic fatigue, post gastrectomy malabsorption, iron deficiency anemia  Results of recent blood work reviewed with patient  Hemoglobin has improved and went up from 10 7 to 11 7  Significant improvement of ferritin from 5-176  Patient was evaluated by [facility] and has received 5 Venofer infusions, last treatment 3 weeks ago  She reports partial but incomplete improvement of fatigue  Patient states that she feels achier than normal   Patient reports 2 recent sinus infections and has notice worsening of asthma and allergy symptoms manifesting as intermittent chest tightness and increased use of rescue inhaler, patient states that inhaler helps  She remains on daily regimen of Symbicort and montelukast      She also admits to increased joint aches  Patient is concerned about mild elevation of PTH  PTH was 80 in March of 2020 and is currently 80    Patient uses Caltrate with vitamin-D 600 mg daily, she also remains on multivitamin daily  Vitamin-D level performed in 2020 was 32  Current Calcium level is on the lower side of normal at 8 5    Recent TSH is slightly suppressed at 0 2, free T4 is normal   Patient denies any signs or symptoms of hyperthyroidism  No palpitations, general is or increased anxiety  Patient remains on levothyroxine 112 mcg daily  Patient admits to intermittent sensation of neck fullness  She has pantoprazole on hand but has not been using medication daily  Previous evaluation with thyroid ultrasound in 2017 revealed atrophic thyroid gland likely secondary to autoimmune thyroiditis and no dominant nodules  Hypertension  Patient has noticed improved blood pressure readings on twice a day metoprolol  She is requesting updated prescription  Current doses 50 mg in the morning and 25-50 mg in the evening  Patient denies symptoms of chest pain, palpitations, shortness of breath or dizziness         Past Medical History:   Diagnosis Date    Anxiety     Disorder, per Allscripts    Arthritis     Disease of thyroid gland     HPV in female     Osteoarthritis        Past Surgical History:   Procedure Laterality Date     SECTION      GASTRIC BYPASS      For Morbid Obesity, per Allscripts    HIP SURGERY      MOUTH SURGERY      SINUS SURGERY         Current Outpatient Medications   Medication Sig Dispense Refill    amphetamine-dextroamphetamine (ADDERALL) 15 MG tablet Take 1 tablet by mouth daily as needed      buPROPion (WELLBUTRIN XL) 300 mg 24 hr tablet Take 1 tablet by mouth daily      cyanocobalamin 1,000 mcg/mL INJECT 1 ML ONCE A WEEK X 4 TIMES THEN EVERY 2 WEEKS X 4 TIMES THEN USE 1 ML EVERY 3-4 WEEKS 10 mL 3    diclofenac sodium (VOLTAREN) 1 % USE 2 GRAMS 4 TIMES DAILY AS NEEDED FOR UPPER BODY JOINTS AND 4 GRAMS FOR LOWER BODY JOINTS 500 g 1    escitalopram (LEXAPRO) 20 mg tablet Take 1 tablet by mouth daily      famotidine (PEPCID) 40 MG tablet Take 1 tablet (40 mg total) by mouth daily at bedtime 90 tablet 1    Fe-Succ Ac-C-Thre Ac-B12-FA (FERREX 150 FORTE PLUS)  MG CAPS Take by mouth      gabapentin (NEURONTIN) 100 mg capsule Take 100 mg by mouth daily        levalbuterol (Xopenex HFA) 45 mcg/act inhaler Inhale 2 puffs every 6 (six) hours as needed for wheezing or shortness of breath (cough) 1 Inhaler 3    levothyroxine 112 mcg tablet TAKE 1 TABLET BY MOUTH EVERY DAY 90 tablet 1    lisdexamfetamine (VYVANSE) 40 MG capsule Take by mouth 2 (two) times a day      meloxicam (MOBIC) 15 mg tablet Take 1 tablet once a day after food as needed for joint pain 90 tablet 1    metoprolol tartrate (LOPRESSOR) 50 mg tablet Take 1 tab twice a day 180 tablet 3    mometasone (Nasonex) 50 mcg/act nasal spray 2 sprays into each nostril daily 51 Act 3    montelukast (SINGULAIR) 10 mg tablet Take 1 tablet (10 mg total) by mouth daily at bedtime 90 tablet 2    pantoprazole (PROTONIX) 40 mg tablet Take 1 tablet (40 mg total) by mouth daily before breakfast Take 30 minutes before 90 tablet 3    predniSONE 10 mg tablet Take 40 mg qd x 2 days; then 30 mg daily x 2 days then 20 mgdaily x 2 days then 10 mg qd x 2 days, then half a tab daily x 2 days 22 tablet 0    SYMBICORT 160-4 5 MCG/ACT inhaler TAKE 2 PUFFS BY MOUTH TWICE A DAY 3 Inhaler 2    SYRINGE-NEEDLE, DISP, 3 ML (B-D SYRINGE/NEEDLE 3CC/23GX1") 23G X 1" 3 ML MISC Use  For vitamin B12 injections 10 each 5    triamcinolone (KENALOG) 0 5 % ointment APPLY TO AFFECTED AREA TWICE A DAY 30 g 0    valACYclovir (VALTREX) 1,000 mg tablet Take by mouth       No current facility-administered medications for this visit  Allergies   Allergen Reactions    Cephalexin Rash     Reaction Date: 28Jul2011;  Cephalosporin per patient     Moxifloxacin Rash     Reaction Date: 18Apr2011;     Sulfamethoxazole-Trimethoprim Rash       Review of Systems   Constitutional: Positive for fatigue     HENT: Positive for congestion and postnasal drip  Intermittent symptoms of anterior neck fullness   Respiratory: Positive for chest tightness  Cardiovascular: Negative  Genitourinary: Negative  Musculoskeletal: Positive for arthralgias  Neurological: Negative  Psychiatric/Behavioral: Negative  Video Exam    There were no vitals filed for this visit  Physical Exam   Constitutional: She is oriented to person, place, and time  She appears well-developed and well-nourished  Pulmonary/Chest: Effort normal  No respiratory distress  Unlabored breathing, no cough throughout exam, no audible wheezing   Neurological: She is alert and oriented to person, place, and time  Psychiatric: She has a normal mood and affect  Her behavior is normal         As a result of this visit, I have not referred the patient for further respiratory evaluation  I spent 25 minutes directly with the patient during this visit      VIRTUAL VISIT DISCLAIMER    Eliobelem Ozunayudith acknowledges that she has consented to an online visit or consultation  She understands that the online visit is based solely on information provided by her, and that, in the absence of a face-to-face physical evaluation by the physician, the diagnosis she receives is both limited and provisional in terms of accuracy and completeness  This is not intended to replace a full medical face-to-face evaluation by the physician  Elio Long understands and accepts these terms

## 2020-07-13 NOTE — ASSESSMENT & PLAN NOTE
· Worsening of myalgias and arthralgias  · Continue meloxicam 15 mg daily p r n  Joaquina Blend   · Patient will start ADA steroid taper to alleviate her symptoms  · She has declined further follow-up with Rheumatology on multiple occasions

## 2020-07-16 DIAGNOSIS — J45.40 MODERATE PERSISTENT REACTIVE AIRWAY DISEASE WITHOUT COMPLICATION: ICD-10-CM

## 2020-07-16 RX ORDER — PREDNISONE 10 MG/1
TABLET ORAL
Qty: 22 TABLET | Refills: 0 | OUTPATIENT
Start: 2020-07-16

## 2020-07-17 ENCOUNTER — TELEPHONE (OUTPATIENT)
Dept: FAMILY MEDICINE CLINIC | Facility: CLINIC | Age: 55
End: 2020-07-17

## 2020-07-17 DIAGNOSIS — J45.40 MODERATE PERSISTENT REACTIVE AIRWAY DISEASE WITHOUT COMPLICATION: ICD-10-CM

## 2020-07-17 RX ORDER — PREDNISONE 10 MG/1
TABLET ORAL
Qty: 40 TABLET | Refills: 0 | Status: SHIPPED | OUTPATIENT
Start: 2020-07-17 | End: 2020-07-30 | Stop reason: ALTCHOICE

## 2020-07-17 NOTE — TELEPHONE ENCOUNTER
----- Message from Arun Lyn sent at 7/16/2020  4:48 PM EDT -----  Regarding: FW: Prescription Question  Contact: [phone]      ----- Message -----  From: Reba Hernandez  Sent: 7/16/2020   4:39 PM EDT  [facility]  Subject: Prescription Question                            Hi Dr Gerda Hastings   you were spot on with the prednisone in fact I wasn't aware of how bad I was feeling until I took it I was able to smell again and taste food again and I didn't even realize that it was gone! I used inhaler 3 times in ten days  If you agree I think another round is indicated  Symptoms came back quickly and feels like sinus infection coming on but it's not     Please advise what's best    Thank you  [facility]

## 2020-07-20 RX ORDER — PREDNISONE 10 MG/1
TABLET ORAL
Qty: 40 TABLET | Refills: 0 | OUTPATIENT
Start: 2020-07-20

## 2020-07-29 ENCOUNTER — TELEPHONE (OUTPATIENT)
Dept: FAMILY MEDICINE CLINIC | Facility: CLINIC | Age: 55
End: 2020-07-29

## 2020-07-29 NOTE — TELEPHONE ENCOUNTER
----- Message from [facility] sent at 7/29/2020  2:14 PM EDT -----  Regarding: Non-Urgent Medical Question  Contact: [phone]  Still having sinus infection and I should have an appointment I think   Can I do virtual?

## 2020-07-30 ENCOUNTER — TELEMEDICINE (OUTPATIENT)
Dept: FAMILY MEDICINE CLINIC | Facility: CLINIC | Age: 55
End: 2020-07-30
Payer: COMMERCIAL

## 2020-07-30 ENCOUNTER — TELEPHONE (OUTPATIENT)
Dept: FAMILY MEDICINE CLINIC | Facility: CLINIC | Age: 55
End: 2020-07-30

## 2020-07-30 DIAGNOSIS — J01.90 SUBACUTE SINUSITIS, UNSPECIFIED LOCATION: Primary | ICD-10-CM

## 2020-07-30 PROCEDURE — 99213 OFFICE O/P EST LOW 20 MIN: CPT | Performed by: NURSE PRACTITIONER

## 2020-07-30 PROCEDURE — 3078F DIAST BP <80 MM HG: CPT | Performed by: NURSE PRACTITIONER

## 2020-07-30 PROCEDURE — 1036F TOBACCO NON-USER: CPT | Performed by: NURSE PRACTITIONER

## 2020-07-30 PROCEDURE — 3074F SYST BP LT 130 MM HG: CPT | Performed by: NURSE PRACTITIONER

## 2020-07-30 RX ORDER — AMOXICILLIN AND CLAVULANATE POTASSIUM 875; 125 MG/1; MG/1
1 TABLET, FILM COATED ORAL EVERY 12 HOURS SCHEDULED
Qty: 20 TABLET | Refills: 0 | Status: SHIPPED | OUTPATIENT
Start: 2020-07-30 | End: 2020-08-09

## 2020-07-30 RX ORDER — AMOXICILLIN AND CLAVULANATE POTASSIUM 875; 125 MG/1; MG/1
1 TABLET, FILM COATED ORAL EVERY 12 HOURS SCHEDULED
Qty: 14 TABLET | Refills: 0 | Status: SHIPPED | OUTPATIENT
Start: 2020-07-30 | End: 2020-07-30 | Stop reason: SDUPTHER

## 2020-07-30 NOTE — TELEPHONE ENCOUNTER
Pharmacy is requesting a new script for the Amoxicillin for a 10 day supply instead of 7 days  Please resend to [facility] thank you

## 2020-08-02 NOTE — PROGRESS NOTES
Virtual Regular Visit      Assessment/Plan:    Problem List Items Addressed This Visit        Respiratory    Sinusitis - Primary    Relevant Medications    amoxicillin-clavulanate (AUGMENTIN) 875-125 mg per tablet        This 52-year-old female presents today for sinusitis  Started with sinusitis in May, was initially treated with a course of Augmentin, symptoms improved, but did not completely clear  She was treated with a course of doxycycline at the end of June, and recently completed 2 courses of prednisone  Symptoms improved but did not completely resolve  Will trial course of Augmentin 875-125 mg twice daily for 10 days  She will continue Zyrtec, Nasonex, nasal saline rinses for allergy component  [place]  She is hoping the change of environment paired with antibiotics will clear her sinuses  If symptoms are persistenting, may need to consider CT sinuses and ENT referral   She will call for persisting symptoms  Reason for visit is   Chief Complaint   Patient presents with    vitals     LMOM for vitals cs    Virtual Regular Visit        Encounter provider [address] [place] [address], [facility]    Provider located at [address] [place] [id number]      Recent Visits  Date Type Provider Dept   07/30/20 Telephone [address]   07/30/20 Telemedicine Marysol Cox, [address]  [facility]    07/29/20 Telephone [facility], [facility] recent visits within past 7 days and meeting all other requirements     Future Appointments  No visits were found meeting these conditions  Showing future appointments within next 150 days and meeting all other requirements        The patient was identified by name and date of birth   Radha Johnson was informed that this is a telemedicine visit and that the visit is being conducted through [address] [place] [address] and patient was informed that this is not a secure, HIPAA-complaint platform  She agrees to proceed     My office door was closed  No one else was in the room  She acknowledged consent and understanding of privacy and security of the video platform  The patient has agreed to participate and understands they can discontinue the visit at any time  Patient is aware this is a billable service  Subjective  Makayla Glasgow is a 61-year-old female presenting today for sinusitis  She was treated with Augmentin in May for sinusitis, symptoms improved, but did not completely resolve  End of  she took a course of doxycycline, this was not effective  Recently completed 2 courses of prednisone, which did help, but nasal congestion, post nasal drip, and sinus pressure persist   She has been taking Zyrtec daily, Benadryl as needed  Currently not needing to use albuterol inhaler  She has a lot of pets at home, and [place]  Feels allergies are what is contributing to her persistent sinusitis  She is using Nasonex and nasal saline rinses             Past Medical History:   Diagnosis Date    Anxiety     Disorder, per Allscripts    Arthritis     Disease of thyroid gland     HPV in female     Osteoarthritis        Past Surgical History:   Procedure Laterality Date     SECTION      GASTRIC BYPASS      For Morbid Obesity, per Allscripts    HIP SURGERY      MOUTH SURGERY      SINUS SURGERY         Current Outpatient Medications   Medication Sig Dispense Refill    amphetamine-dextroamphetamine (ADDERALL) 15 MG tablet Take 1 tablet by mouth daily as needed      buPROPion (WELLBUTRIN XL) 300 mg 24 hr tablet Take 1 tablet by mouth daily      cyanocobalamin 1,000 mcg/mL INJECT 1 ML ONCE A WEEK X 4 TIMES THEN EVERY 2 WEEKS X 4 TIMES THEN USE 1 ML EVERY 3-4 WEEKS 10 mL 3    escitalopram (LEXAPRO) 20 mg tablet Take 1 tablet by mouth daily      gabapentin (NEURONTIN) 100 mg capsule Take 100 mg by mouth daily        levalbuterol (Xopenex HFA) 45 mcg/act inhaler Inhale 2 puffs every 6 (six) hours as needed for wheezing or shortness of breath (cough) 1 Inhaler 3    levothyroxine 112 mcg tablet TAKE 1 TABLET BY MOUTH EVERY DAY 90 tablet 1    lisdexamfetamine (VYVANSE) 40 MG capsule Take by mouth 2 (two) times a day      meloxicam (MOBIC) 15 mg tablet Take 1 tablet once a day after food as needed for joint pain 90 tablet 1    metoprolol tartrate (LOPRESSOR) 50 mg tablet Take 1 tab twice a day 180 tablet 3    mometasone (Nasonex) 50 mcg/act nasal spray 2 sprays into each nostril daily 51 Act 3    montelukast (SINGULAIR) 10 mg tablet Take 1 tablet (10 mg total) by mouth daily at bedtime 90 tablet 2    pantoprazole (PROTONIX) 40 mg tablet Take 1 tablet (40 mg total) by mouth daily before breakfast Take 30 minutes before 90 tablet 3    SYMBICORT 160-4 5 MCG/ACT inhaler TAKE 2 PUFFS BY MOUTH TWICE A DAY 3 Inhaler 2    triamcinolone (KENALOG) 0 5 % ointment APPLY TO AFFECTED AREA TWICE A DAY 30 g 0    amoxicillin-clavulanate (AUGMENTIN) 875-125 mg per tablet Take 1 tablet by mouth every 12 (twelve) hours for 10 days 20 tablet 0    diclofenac sodium (VOLTAREN) 1 % USE 2 GRAMS 4 TIMES DAILY AS NEEDED FOR UPPER BODY JOINTS AND 4 GRAMS FOR LOWER BODY JOINTS 500 g 1    famotidine (PEPCID) 40 MG tablet Take 1 tablet (40 mg total) by mouth daily at bedtime 90 tablet 1    Fe-Succ Ac-C-Thre Ac-B12-FA (FERREX 150 FORTE PLUS)  MG CAPS Take by mouth      SYRINGE-NEEDLE, DISP, 3 ML (B-D SYRINGE/NEEDLE 3CC/23GX1") 23G X 1" 3 ML MISC Use  For vitamin B12 injections 10 each 5    valACYclovir (VALTREX) 1,000 mg tablet Take by mouth       No current facility-administered medications for this visit           Allergies   Allergen Reactions    Cephalexin Rash     Reaction Date: 28Jul2011;  Cephalosporin per patient     Moxifloxacin Rash     Reaction Date: 18Apr2011;     Sulfamethoxazole-Trimethoprim Rash       Review of Systems   Constitutional: Negative  HENT: Positive for congestion, postnasal drip, sinus pressure and sinus pain  Negative for ear pain and sore throat  Respiratory: Negative  Cardiovascular: Negative  Gastrointestinal: Positive for nausea (from post nasal drip)  Negative for vomiting  Neurological: Negative for dizziness and headaches  Video Exam    Physical Exam   Constitutional: She is oriented to person, place, and time  She appears well-developed and well-nourished  No distress  HENT:   Head: Normocephalic and atraumatic  Eyes: Conjunctivae are normal    Pulmonary/Chest: Effort normal  No respiratory distress  Neurological: She is alert and oriented to person, place, and time  Psychiatric: She has a normal mood and affect  Her behavior is normal  Judgment and thought content normal    Nursing note and vitals reviewed  I spent 13 minutes directly with the patient during this visit      VIRTUAL VISIT DISCLAIMER    Denisha Robles acknowledges that she has consented to an online visit or consultation  She understands that the online visit is based solely on information provided by her, and that, in the absence of a face-to-face physical evaluation by the physician, the diagnosis she receives is both limited and provisional in terms of accuracy and completeness  This is not intended to replace a full medical face-to-face evaluation by the physician  Denisha Robles understands and accepts these terms

## 2020-08-26 ENCOUNTER — TELEPHONE (OUTPATIENT)
Dept: GASTROENTEROLOGY | Facility: AMBULARY SURGERY CENTER | Age: 55
End: 2020-08-26

## 2020-09-02 DIAGNOSIS — K21.9 CHRONIC GERD: ICD-10-CM

## 2020-09-02 RX ORDER — FAMOTIDINE 40 MG/1
TABLET, FILM COATED ORAL
Qty: 90 TABLET | Refills: 1 | Status: SHIPPED | OUTPATIENT
Start: 2020-09-02

## 2020-09-11 DIAGNOSIS — E03.9 HYPOTHYROIDISM, UNSPECIFIED TYPE: ICD-10-CM

## 2020-09-11 RX ORDER — LEVOTHYROXINE SODIUM 112 UG/1
TABLET ORAL
Qty: 90 TABLET | Refills: 1 | OUTPATIENT
Start: 2020-09-11

## 2020-09-17 DIAGNOSIS — L40.50 PSORIASIS WITH ARTHROPATHY (HCC): ICD-10-CM

## 2020-09-17 RX ORDER — MELOXICAM 15 MG/1
TABLET ORAL
Qty: 90 TABLET | Refills: 1 | Status: SHIPPED | OUTPATIENT
Start: 2020-09-17 | End: 2021-03-17

## 2020-11-11 ENCOUNTER — TELEPHONE (OUTPATIENT)
Dept: FAMILY MEDICINE CLINIC | Facility: CLINIC | Age: 55
End: 2020-11-11

## 2020-11-11 DIAGNOSIS — E03.9 HYPOTHYROIDISM, UNSPECIFIED TYPE: ICD-10-CM

## 2020-11-13 DIAGNOSIS — E03.9 HYPOTHYROIDISM, UNSPECIFIED TYPE: ICD-10-CM

## 2020-11-13 RX ORDER — LEVOTHYROXINE SODIUM 112 UG/1
TABLET ORAL
Qty: 30 TABLET | Refills: 0 | Status: SHIPPED | OUTPATIENT
Start: 2020-11-13 | End: 2021-02-25

## 2020-11-15 DIAGNOSIS — L30.9 ECZEMA, UNSPECIFIED TYPE: ICD-10-CM

## 2020-11-15 RX ORDER — LEVOTHYROXINE SODIUM 112 UG/1
TABLET ORAL
Qty: 30 TABLET | Refills: 0 | OUTPATIENT
Start: 2020-11-15

## 2020-11-16 RX ORDER — TRIAMCINOLONE ACETONIDE 5 MG/G
1 OINTMENT TOPICAL 2 TIMES DAILY
Qty: 30 G | Refills: 0 | Status: SHIPPED | OUTPATIENT
Start: 2020-11-16 | End: 2021-02-25

## 2020-12-04 ENCOUNTER — AMB VIDEO VISIT (OUTPATIENT)
Dept: OTHER | Facility: HOSPITAL | Age: 55
End: 2020-12-04

## 2020-12-19 DIAGNOSIS — J45.40 MODERATE PERSISTENT REACTIVE AIRWAY DISEASE WITHOUT COMPLICATION: ICD-10-CM

## 2020-12-19 RX ORDER — MONTELUKAST SODIUM 10 MG/1
TABLET ORAL
Qty: 90 TABLET | Refills: 2 | Status: SHIPPED | OUTPATIENT
Start: 2020-12-19 | End: 2021-09-07

## 2021-01-15 DIAGNOSIS — Z23 ENCOUNTER FOR IMMUNIZATION: ICD-10-CM

## 2021-01-17 ENCOUNTER — IMMUNIZATIONS (OUTPATIENT)
Dept: FAMILY MEDICINE CLINIC | Facility: HOSPITAL | Age: 56
End: 2021-01-17

## 2021-01-17 DIAGNOSIS — Z23 ENCOUNTER FOR IMMUNIZATION: Primary | ICD-10-CM

## 2021-01-17 PROCEDURE — 0001A SARS-COV-2 / COVID-19 MRNA VACCINE (PFIZER-BIONTECH) 30 MCG: CPT

## 2021-01-17 PROCEDURE — 91300 SARS-COV-2 / COVID-19 MRNA VACCINE (PFIZER-BIONTECH) 30 MCG: CPT

## 2021-02-05 ENCOUNTER — IMMUNIZATIONS (OUTPATIENT)
Dept: FAMILY MEDICINE CLINIC | Facility: HOSPITAL | Age: 56
End: 2021-02-05

## 2021-02-05 DIAGNOSIS — Z23 ENCOUNTER FOR IMMUNIZATION: Primary | ICD-10-CM

## 2021-02-05 PROCEDURE — 91300 SARS-COV-2 / COVID-19 MRNA VACCINE (PFIZER-BIONTECH) 30 MCG: CPT

## 2021-02-05 PROCEDURE — 0002A SARS-COV-2 / COVID-19 MRNA VACCINE (PFIZER-BIONTECH) 30 MCG: CPT

## 2021-02-24 ENCOUNTER — LAB (OUTPATIENT)
Dept: LAB | Facility: CLINIC | Age: 56
End: 2021-02-24
Payer: COMMERCIAL

## 2021-02-24 ENCOUNTER — TRANSCRIBE ORDERS (OUTPATIENT)
Dept: LAB | Facility: CLINIC | Age: 56
End: 2021-02-24

## 2021-02-24 ENCOUNTER — TELEPHONE (OUTPATIENT)
Dept: FAMILY MEDICINE CLINIC | Facility: CLINIC | Age: 56
End: 2021-02-24

## 2021-02-24 DIAGNOSIS — L30.9 ECZEMA, UNSPECIFIED TYPE: ICD-10-CM

## 2021-02-24 DIAGNOSIS — Z90.3 POSTGASTRECTOMY MALABSORPTION: Primary | ICD-10-CM

## 2021-02-24 DIAGNOSIS — K91.2 POSTGASTRECTOMY MALABSORPTION: ICD-10-CM

## 2021-02-24 DIAGNOSIS — Z90.3 POSTGASTRECTOMY MALABSORPTION: ICD-10-CM

## 2021-02-24 DIAGNOSIS — E21.3 HYPERPARATHYROIDISM (HCC): Primary | ICD-10-CM

## 2021-02-24 DIAGNOSIS — K91.2 POSTGASTRECTOMY MALABSORPTION: Primary | ICD-10-CM

## 2021-02-24 DIAGNOSIS — E03.9 HYPOTHYROIDISM, UNSPECIFIED TYPE: ICD-10-CM

## 2021-02-24 LAB
ANION GAP SERPL CALCULATED.3IONS-SCNC: 4 MMOL/L (ref 4–13)
BUN SERPL-MCNC: 20 MG/DL (ref 5–25)
CA-I BLD-SCNC: 1.13 MMOL/L (ref 1.12–1.32)
CALCIUM SERPL-MCNC: 9.4 MG/DL (ref 8.3–10.1)
CHLORIDE SERPL-SCNC: 106 MMOL/L (ref 100–108)
CO2 SERPL-SCNC: 31 MMOL/L (ref 21–32)
CREAT SERPL-MCNC: 0.94 MG/DL (ref 0.6–1.3)
ERYTHROCYTE [DISTWIDTH] IN BLOOD BY AUTOMATED COUNT: 11.9 % (ref 11.6–15.1)
FERRITIN SERPL-MCNC: 76 NG/ML (ref 8–388)
GFR SERPL CREATININE-BSD FRML MDRD: 69 ML/MIN/1.73SQ M
GLUCOSE P FAST SERPL-MCNC: 116 MG/DL (ref 65–99)
HCT VFR BLD AUTO: 37.1 % (ref 34.8–46.1)
HGB BLD-MCNC: 11.6 G/DL (ref 11.5–15.4)
IRON SERPL-MCNC: 75 UG/DL (ref 50–170)
MCH RBC QN AUTO: 30.8 PG (ref 26.8–34.3)
MCHC RBC AUTO-ENTMCNC: 31.3 G/DL (ref 31.4–37.4)
MCV RBC AUTO: 98 FL (ref 82–98)
PLATELET # BLD AUTO: 324 THOUSANDS/UL (ref 149–390)
PMV BLD AUTO: 9.8 FL (ref 8.9–12.7)
POTASSIUM SERPL-SCNC: 4.4 MMOL/L (ref 3.5–5.3)
PTH-INTACT SERPL-MCNC: 113.2 PG/ML (ref 18.4–80.1)
RBC # BLD AUTO: 3.77 MILLION/UL (ref 3.81–5.12)
SODIUM SERPL-SCNC: 141 MMOL/L (ref 136–145)
T4 FREE SERPL-MCNC: 1.13 NG/DL (ref 0.76–1.46)
TIBC SERPL-MCNC: 253 UG/DL (ref 250–450)
TSH SERPL DL<=0.05 MIU/L-ACNC: 0.08 UIU/ML (ref 0.36–3.74)
WBC # BLD AUTO: 7.8 THOUSAND/UL (ref 4.31–10.16)

## 2021-02-24 PROCEDURE — 82330 ASSAY OF CALCIUM: CPT

## 2021-02-24 PROCEDURE — 83550 IRON BINDING TEST: CPT

## 2021-02-24 PROCEDURE — 84443 ASSAY THYROID STIM HORMONE: CPT

## 2021-02-24 PROCEDURE — 83970 ASSAY OF PARATHORMONE: CPT

## 2021-02-24 PROCEDURE — 36415 COLL VENOUS BLD VENIPUNCTURE: CPT

## 2021-02-24 PROCEDURE — 85027 COMPLETE CBC AUTOMATED: CPT

## 2021-02-24 PROCEDURE — 82728 ASSAY OF FERRITIN: CPT

## 2021-02-24 PROCEDURE — 84439 ASSAY OF FREE THYROXINE: CPT

## 2021-02-24 PROCEDURE — 80048 BASIC METABOLIC PNL TOTAL CA: CPT

## 2021-02-24 PROCEDURE — 83540 ASSAY OF IRON: CPT

## 2021-02-25 PROBLEM — E21.3 HYPERPARATHYROIDISM (HCC): Status: ACTIVE | Noted: 2021-02-25

## 2021-02-25 RX ORDER — LEVOTHYROXINE SODIUM 0.1 MG/1
TABLET ORAL
Qty: 90 TABLET | Refills: 0 | Status: SHIPPED | OUTPATIENT
Start: 2021-02-25 | End: 2021-05-20

## 2021-02-25 RX ORDER — TRIAMCINOLONE ACETONIDE 5 MG/G
1 OINTMENT TOPICAL 2 TIMES DAILY
Qty: 30 G | Refills: 0 | Status: SHIPPED | OUTPATIENT
Start: 2021-02-25 | End: 2021-04-28

## 2021-02-25 NOTE — TELEPHONE ENCOUNTER
Please contact patient  I reviewed her blood work  · Her thyroid function is still overactive  I reduced dose of levothyroxine from 112 to 100 mcg daily  ·   Level of PTH, parathyroid hormone remains elevated, higher than before  ·   I recommend to schedule consultation with [facility]'s Endocrinology  · I will place a referral     · Patient should schedule annual physical with me as well       thank you

## 2021-03-02 DIAGNOSIS — J32.9 SINUSITIS, UNSPECIFIED CHRONICITY, UNSPECIFIED LOCATION: ICD-10-CM

## 2021-03-02 RX ORDER — MOMETASONE FUROATE 50 UG/1
SPRAY, METERED NASAL
Qty: 1 ACT | Refills: 3 | Status: SHIPPED | OUTPATIENT
Start: 2021-03-02 | End: 2021-07-08

## 2021-03-17 DIAGNOSIS — L40.50 PSORIASIS WITH ARTHROPATHY (HCC): ICD-10-CM

## 2021-03-17 DIAGNOSIS — K21.9 CHRONIC GERD: ICD-10-CM

## 2021-03-17 RX ORDER — PANTOPRAZOLE SODIUM 40 MG/1
40 TABLET, DELAYED RELEASE ORAL
Qty: 90 TABLET | Refills: 3 | Status: SHIPPED | OUTPATIENT
Start: 2021-03-17 | End: 2022-04-11

## 2021-03-17 RX ORDER — MELOXICAM 15 MG/1
TABLET ORAL
Qty: 90 TABLET | Refills: 1 | Status: SHIPPED | OUTPATIENT
Start: 2021-03-17 | End: 2021-09-07

## 2021-04-21 ENCOUNTER — CONSULT (OUTPATIENT)
Dept: ENDOCRINOLOGY | Facility: CLINIC | Age: 56
End: 2021-04-21
Payer: COMMERCIAL

## 2021-04-21 VITALS
WEIGHT: 181.25 LBS | HEIGHT: 64 IN | DIASTOLIC BLOOD PRESSURE: 90 MMHG | HEART RATE: 78 BPM | SYSTOLIC BLOOD PRESSURE: 122 MMHG | BODY MASS INDEX: 30.94 KG/M2

## 2021-04-21 DIAGNOSIS — E21.3 HYPERPARATHYROIDISM (HCC): ICD-10-CM

## 2021-04-21 DIAGNOSIS — Z98.84 HISTORY OF GASTRIC BYPASS: ICD-10-CM

## 2021-04-21 DIAGNOSIS — E03.9 HYPOTHYROIDISM, UNSPECIFIED TYPE: ICD-10-CM

## 2021-04-21 DIAGNOSIS — E55.9 VITAMIN D DEFICIENCY: Primary | ICD-10-CM

## 2021-04-21 DIAGNOSIS — Z90.3 POSTGASTRECTOMY MALABSORPTION: ICD-10-CM

## 2021-04-21 DIAGNOSIS — K91.2 POSTGASTRECTOMY MALABSORPTION: ICD-10-CM

## 2021-04-21 PROCEDURE — 3008F BODY MASS INDEX DOCD: CPT | Performed by: INTERNAL MEDICINE

## 2021-04-21 PROCEDURE — 99244 OFF/OP CNSLTJ NEW/EST MOD 40: CPT | Performed by: INTERNAL MEDICINE

## 2021-04-21 PROCEDURE — 1036F TOBACCO NON-USER: CPT | Performed by: INTERNAL MEDICINE

## 2021-04-21 NOTE — PROGRESS NOTES
Chief Complaint   Patient presents with    Hypothyroidism    Hyperparathyroidism      Referring Provider  Margaret Gamboa Md  [address] ,  [facility] [facility] [address]     History of Present Illness:   Ed Rodríguez is a 55 y o  female with hypothyroidism and hyperparathyroidism seen in consultation at the request of Dr Rosita Benoit    She was diagnosed at the age of 26yrs  She recalls this was when she was gaining weight  She reaclls being on several dose in the past from 100-125mcg  She has a history of gastric bypass 2007  She feels that her doses change with her weight  She is also going through menopause  Levothyroxine 100mcg once daily  She takes this daily and does not miss does  In the past, she was on brands and thinks this was better on a brand  She reports a hx of psoriatic arthritis and spondylithiasis  Her main concerns are about thinning skin  There are times when she has "swollen glands" in her throat, but a thyroid ultrasound was done in 2017 that showed an atrophied thyroid  GYB: she is taking MVI, vitamin C, and quercitin supplements  She also recalls a liquid vitamin D, but is unsure about the number of units (1000 vs 5000 units)  She laos has had significant iron deficiency from her RYGBP and has recently had iron infusions  Her PTH has been elevated, but her calciums are in the normal range  There is no personal hx of renal stones  She denies constipation or abdominal pain, but reports GERD  She denies mood changes  Also has a dry mouth, but not nocturia  She feels more sluyggies    Dietary Calcium intake: She feels lactose intolerant and has issues with chewing veggies  Yogurt is ok, but not will have a few times per week  There is milk in her coffee, maybe a cup per day  Calcium/Vitamin D supplementation: does take some in the MVI and   Weight bearing exercises: she reports "being on her feet," but not other exercise       Fhx: high calcium, renal stones in sister    Patient Active Problem List   Diagnosis    Anemia    Attention deficit hyperactivity disorder    Depression with anxiety    Eczema    Generalized anxiety disorder    Hypothyroidism    Leiomyoma of uterus    Masses of both breasts    Pain syndrome, chronic    [facility]iasis [facility] arthropathy [facility] [address] [facility]    Reactive airway disease    Vitamin B12 deficiency    Chronic GERD    Essential hypertension    Sinusitis    Iron deficiency anemia, unspecified    Health care maintenance    Postgastrectomy malabsorption    Hyperparathyroidism ([facility] [address] )    History of gastric bypass      Past Medical History:   Diagnosis Date    Anxiety     Disorder, per [employer]    Arthritis     Disease of thyroid gland     HPV in female     Osteoarthritis       Past Surgical History:   Procedure Laterality Date     SECTION      GASTRIC BYPASS      For Morbid Obesity, per [employer]    HIP SURGERY      MOUTH SURGERY      SINUS SURGERY        Family History   Problem Relation Age of Onset    Dementia Mother     Osteopenia Mother     Leukemia Father      Social History     Tobacco Use    Smoking status: Former Smoker     Quit date:      Years since quittin 3    Smokeless tobacco: Never Used    Tobacco comment: Never a smoker, per [employer]   Substance Use Topics    Alcohol use: No     Allergies   Allergen Reactions    Cephalexin Rash     Reaction Date: 2011;  Cephalosporin per patient     Moxifloxacin Rash     Reaction Date: 2011;     Sulfamethoxazole-Trimethoprim Rash         Current Outpatient Medications:     buPROPion (WELLBUTRIN XL) 300 mg 24 hr tablet, Take 1 tablet by mouth daily, Disp: , Rfl:     cyanocobalamin 1,000 mcg/mL, INJECT 1 ML ONCE A WEEK X 4 TIMES THEN EVERY 2 WEEKS X 4 TIMES THEN USE 1 ML EVERY 3-4 WEEKS, Disp: 10 mL, Rfl: 3    diclofenac sodium (VOLTAREN) 1 %, USE 2 GRAMS 4 TIMES DAILY AS NEEDED FOR UPPER BODY JOINTS AND 4 GRAMS FOR LOWER BODY JOINTS, Disp: 500 g, Rfl: 1    escitalopram (LEXAPRO) 20 mg tablet, Take 1 tablet by mouth daily, Disp: , Rfl:     famotidine (PEPCID) 40 MG tablet, TAKE 1 TABLET BY MOUTH DAILY AT BEDTIME, Disp: 90 tablet, Rfl: 1    gabapentin (NEURONTIN) 100 mg capsule, Take 400 mg by mouth daily , Disp: , Rfl:     levalbuterol (Xopenex HFA) 45 mcg/act inhaler, Inhale 2 puffs every 6 (six) hours as needed for wheezing or shortness of breath (cough), Disp: 1 Inhaler, Rfl: 3    levothyroxine 100 mcg tablet, TAKE 1 TABLET BY MOUTH EVERY DAY, Disp: 90 tablet, Rfl: 0    lisdexamfetamine (VYVANSE) 40 MG capsule, Take by mouth 2 (two) times a day, Disp: , Rfl:     meloxicam (MOBIC) 15 mg tablet, TAKE 1 TABLET ONCE A DAY AFTER FOOD AS NEEDED FOR JOINT PAIN, Disp: 90 tablet, Rfl: 1    metoprolol tartrate (LOPRESSOR) 50 mg tablet, Take 1 tab twice a day (Patient taking differently: Take 50 mg by mouth daily ), Disp: 180 tablet, Rfl: 3    mometasone (NASONEX) 50 mcg/act nasal spray, SPRAY 2 SPRAYS INTO EACH NOSTRIL EVERY DAY, Disp: 1 Act, Rfl: 3    montelukast (SINGULAIR) 10 mg tablet, TAKE 1 TABLET BY MOUTH DAILY AT BEDTIME, Disp: 90 tablet, Rfl: 2    pantoprazole (PROTONIX) 40 mg tablet, TAKE 1 TABLET (40 MG TOTAL) BY MOUTH DAILY BEFORE BREAKFAST TAKE 30 MINUTES BEFORE, Disp: 90 tablet, Rfl: 3    SYMBICORT 160-4 5 MCG/ACT inhaler, TAKE 2 PUFFS BY MOUTH TWICE A DAY, Disp: 3 Inhaler, Rfl: 2    SYRINGE-NEEDLE, DISP, 3 ML (B-D SYRINGE/NEEDLE 3CC/23GX1") 23G X 1" 3 ML MISC, Use  For vitamin B12 injections, Disp: 10 each, Rfl: 5    triamcinolone (KENALOG) 0 5 % ointment, APPLY 1 APPLICATION TOPICALLY 2 (TWO) TIMES A DAY TO AFFECTED AREA, Disp: 30 g, Rfl: 0    amphetamine-dextroamphetamine (ADDERALL) 15 MG tablet, Take 1 tablet by mouth daily as needed, Disp: , Rfl:     Fe-Succ Ac-C-Thre Ac-B12-FA (FERREX 150 FORTE PLUS)  MG CAPS, Take by mouth, Disp: , Rfl:     valACYclovir (VALTREX) 1,000 mg tablet, Take by mouth, Disp: , Rfl:   Review of Systems   Constitutional: Positive for fatigue  Negative for unexpected weight change  HENT: Negative for hearing loss, trouble swallowing and voice change  Eyes: Negative for visual disturbance  Respiratory: Negative for cough and shortness of breath  Cardiovascular: Positive for palpitations (situational)  Gastrointestinal: Negative for diarrhea and nausea  Endocrine:        Dry mouth   Genitourinary: Negative for menstrual problem  Musculoskeletal: Positive for arthralgias  Neurological: Negative for tremors  Difficulties with balance when standing on one leg   Psychiatric/Behavioral: The patient is nervous/anxious  All other systems reviewed and are negative  Physical Exam:  Body mass index is 31 11 kg/m²  /90   Pulse 78   Ht 5' 4" (1 626 m)   Wt 82 2 kg (181 lb 4 oz)   BMI 31 11 kg/m²    Wt Readings from Last 3 Encounters:   04/21/21 82 2 kg (181 lb 4 oz)   04/29/20 82 kg (180 lb 12 4 oz)   04/24/20 82 3 kg (181 lb 6 4 oz)       GEN: NAD  E/n/m: mask in place, hearing intact bilat  Eyes: no stare or proptosis, EOMI  Neck: trachea midline, thyroid NT to palpation, nl in size, no nodules or neck masses noted, no cervical LAD  CV; heart reg rate s1s2 nl, no m/r/g appreciated  Resp: CTAB, good effort  Ab+BS  Neuro: no tremor, 2+ DTRs in BUE  MS: no c/c in digits, moves all 4 ext, nl muscle bulk, gait nl  Skin: warm and dry, no palmar erythema  Psych: nl mood and affect, no gross lapses in memory    DATA:  Labs:   Lab Results   Component Value Date    TQG1XPKSJIVA 0 078 (L) 02/24/2021         Lab Results   Component Value Date    FREET4 1 13 02/24/2021     Lab Results   Component Value Date     2 (H) 02/24/2021    CALCIUM 9 4 02/24/2021    PHOS 4 2 04/06/2015     Vitamin D 3/2020 32 4, corresponding PTH 80 4 (nl 18 4-80  1)    Radiology    Impression:  1  Vitamin D deficiency    2  Hypothyroidism, unspecified type    3  Hyperparathyroidism ([address] )    4   History of gastric bypass    5  Postgastrectomy malabsorption           Plan:    Isabel Jacobs was seen today for hypothyroidism and hyperparathyroidism  Diagnoses and all orders for this visit:    Vitamin D deficiency  -     Vitamin D 25 hydroxy; Future    Hypothyroidism, unspecified type  -     Ambulatory referral to Endocrinology  -     TSH, 3rd generation Lab Collect; Future    Hyperparathyroidism [facility])  -     Ambulatory referral to Endocrinology  -     Magnesium Lab Collect; Future  -     Phosphorus Lab Collect; Future  -     Calcium, ionized; Future    History of gastric bypass    Postgastrectomy malabsorption      1  Hypothyroidism, unspecified type     - Ambulatory referral to Endocrinology    2  Hyperparathyroidism ([facility] [address] )     - Ambulatory referral to Endocrinology    1  Hypothyroidism: May need a brand preparation to avoid frequent dose changes  Order provided to check TSH at the current dose, but she could be changed to Synthroid or Levoxyl if needed  2  Hyperparathyroid: Her calciums are normal, and I suspect this is related to the RYGBP  She is unsure of her total calicum intake or vitamin D supplementation  I asked that she send picturs of the vitamins so this can be calculated  Iw ill also check iCa, mag and phos as well we vitamin D  3  Post RYGBP malabsorption: See #2, vitamin D levels ordered      Discussed with the patient and all questioned fully answered  She will call me if any problems arise          Rodolfo Eugene MD

## 2021-04-28 DIAGNOSIS — L30.9 ECZEMA, UNSPECIFIED TYPE: ICD-10-CM

## 2021-04-28 RX ORDER — TRIAMCINOLONE ACETONIDE 5 MG/G
1 OINTMENT TOPICAL 2 TIMES DAILY
Qty: 30 G | Refills: 0 | Status: SHIPPED | OUTPATIENT
Start: 2021-04-28 | End: 2021-07-02

## 2021-05-01 ENCOUNTER — AMB VIDEO VISIT (OUTPATIENT)
Dept: OTHER | Facility: HOSPITAL | Age: 56
End: 2021-05-01

## 2021-05-01 PROCEDURE — ECARE PR SL URGENT CARE VIRTUAL VISIT: Performed by: FAMILY MEDICINE

## 2021-05-01 NOTE — CARE ANYWHERE EVISITS
Visit Summary for [address] - Gender: Female - Date of Birth: [id number]  Date: [id number] - Duration: 7 minutes  Patient: [address]  Provider: Priyanka Mccauley    Patient Contact Information  Address  [address]; [place] [address]      Visit Topics  Sinus infection  [Added By: Self - 2021-05-01]    Triage Questions   What is your current physical address in the event of a medical emergency? Answer []  Are you allergic to any medications? Answer []  Are you now or could you be pregnant? Answer []  Do you have any immune system compromise or chronic lung   disease? Answer []  Do you have any vulnerable family members in the home (infant, pregnant, cancer, elderly)? Answer []     Conversation Transcripts  [0A][0A] [Notification] [employer] Staff, [id number] [facility], will help you prepare for your visit  She is Bernadine Herrera, Family Physician [0A][[facility]] Marcelino, and thank you for connecting  While you are waiting for the doctor, are there any questions I   can answer for you about our service? Please contact customer service if you have questions about billing, insurance, or technical issues  Visits work best with a stable WiFi connection, so please make sure you are connected before we   begin [0A][Notification] [employer] Staff has left the room  [0A][Notification] You are connected with Lizzie Herrera Family Physician [0A][Notification] Meghna Chauhan is located in [place]  [0A][Notification] Meghna Chauhan has shared   health history  Cem Huston  [0A][Notification] Lizzie Herrera has added a prescription  [0A]    Diagnosis  Acute sinusitis, unspecified    Procedures  Value: 09336 Code: CPT-4 UNLISTED E&M SERVICE    Medications Prescribed    Augmentin  Dose : 1 tablet  Route : oral  Frequency : every 12 hours       Refills : 0  Instructions to the Pharmacist : Substitutions allowed      Provider Notes  [0A][0A] HPI: The patient has been[0A]having sinus pain, post nasal drip, discolored mucous, headache for more than a[0A]week  Symptoms improved and have[0A]suddenly worsened  No fever, no swollen[0A]glands  Sore throat from drainage  No cough from   drainage  [0A]The patient has been achy and tired  [0A]PMH: HTN, Hypothyroidism, Depression, Anxiety, Neuropathy, ADHD, Allergic rhinitis, and Asthma[0A]Meds: Metoprolol, Levothyroxine, Wellbutrin, Lexapro, Gabapentin, Vyvanse, Nasacort, Albuterol,   Symbicort[0A]Allergies: Cephalosporins[0A]PE:[0A][0A]Gen: Patient is well[0A]appearing and in no acute distress[0A]Eyes: Normal appearing[0A]Nose: Congested[0A]Sinuses: Sinus tenderness[0A]bilaterally[0A]Resp: No   respiratory[0A]distress[0A][0A]Assessment: Acute[0A]Sinusitis [0A]Plan:[0A]1  Medication as[0A]described below  [0A]2  Discussed options and[0A]precautions[0A]3  Recommend plain Mucinex to thin the mucus, Flonase Nasal Spray 2[0A]sprays each nostril daily   to open sinuses and decrease swelling in sinuses, Afrin[0A]nasal spray for acute stuffiness especially at bedtime if stuffiness is[0A]preventing sleep, do not use Afrin for more than 5 days  Drink 64 ounces[0A]of water daily to thin out the mucus and   stay hydrated, will help with[0A]headaches as well  Use a NAVAGE, Simply Saline Nasal Solution or Neti Pot[0A]with nasal saline (ask pharmacist) to flush sinuses once open with above[0A]medications  Use it in the shower, the steam will help open[0A]the   sinuses  Take Vitamin C (Airborne or EmergenC) and Sambucol (black[0A]elderberry) to boost immune system  Tylenol and / or Ibuprofen as[0A]needed for headache or pain  Get at[0A]least 8-10 hour of sleep when ill  [0A]4  Sleeping with[0A]head/chest   elevated can help with sinus drainage[0A]Antibiotic indication:[0A]  It's a virus currently and needs[0A]symptomatic care  If not improved with symptomatic care in 72[0A]hours or if you develop the following, then further treatment may be[0A]needed      [0A]The signs of a[0A]bacterial infection (which patient has in part or totality) are sick for more[0A]than 1 week (at least 10 days) or symptoms worsen after initial improvement, headache,[0A]facial swelling with pain, unilateral pain, teeth or jaw   pain, redness,[0A]discolored bloody nasal secretions, fever (definition is 100 4 but 101 or[0A]higher is more indicative of bacteria), and failed conservative therapy[0A](over-the-counter medication as listed above)[0A]i              [0A]3 days/ + severe   symptoms (fever > 102, purulent nasal[0A]discharge, headache, facial pain) duration must be met & 2 of 3 sx with one[0A]fever > 102 [0A]ii  [0A]7 days / worsening after initial improvement + symptom[0A](sx=purulent nasal discharge, headache,   facial pain) duration must be met &[0A]2 of 3 sx[0A]iii            [0A]>10 days / persistent + symptoms (sx=purulent nasal[0A]discharge, headache, facial pain) duration must be met & 2 of 3 sx[0A] [0A]Antibiotic choice:[0A]Augmentin 875 mg every 12 hours   for 7 days (no co-morbidities, no chronicity,[0A]no sinus surgeries, no antibiotic failure, simple sinusitis)[0A]Follow[0A]up:                                                    [0A]1  Follow up in 5-7 days[0A]if not improving  Discussed   precautions  [0A]2  If there are any[0A]questions or problems with the prescription, call [phone] anytime for[0A]assistance  [0A]3  Please re-connect for[0A]another online visit or see an in-person provider should your symptoms worsen[0A]or not   improving 5-7days  [0A]4  Taking a probiotic (Renew Life Ultimate Christina and Azo Yeast[0A]or Rephresh pH probiotic in the women's feminine hygiene section) while using[0A]antibiotics can help prevent some of the troublesome side effects that   antibiotics[0A]can sometimes cause  Take it 2 hours away from antibiotics  Take it[0A]during antibiotic and for at least 1 week after antibiotic[0A]5   Please print a copy of[0A]this note and send it to your regular doctor, or take it to your next visit   so[0A]it may be included in your medical record  [0A]Patient voiced[0A]understanding and agrees to plan  [0A]Please see your PCP on a[0A]regular basis[0A]    Electronically signed by: Hilda Mas(NPI [id number])

## 2021-05-20 ENCOUNTER — TELEPHONE (OUTPATIENT)
Dept: ENDOCRINOLOGY | Facility: CLINIC | Age: 56
End: 2021-05-20

## 2021-05-20 DIAGNOSIS — E03.9 HYPOTHYROIDISM, UNSPECIFIED TYPE: ICD-10-CM

## 2021-05-20 RX ORDER — LEVOTHYROXINE SODIUM 0.1 MG/1
TABLET ORAL
Qty: 90 TABLET | Refills: 0 | Status: SHIPPED | OUTPATIENT
Start: 2021-05-20 | End: 2021-07-15 | Stop reason: SDUPTHER

## 2021-05-20 NOTE — TELEPHONE ENCOUNTER
----- Message from Ruperto Gonzalez MD sent at 5/20/2021  8:22 AM EDT -----  Regarding: labs  I got a refill request for levothyroxine  While I refilled it, she needs to get her labs done with the order that I gave her at the visit in order to add more refills to the dose   Thanks

## 2021-07-02 ENCOUNTER — APPOINTMENT (OUTPATIENT)
Dept: LAB | Facility: CLINIC | Age: 56
End: 2021-07-02
Payer: COMMERCIAL

## 2021-07-02 DIAGNOSIS — E21.3 HYPERPARATHYROIDISM (HCC): ICD-10-CM

## 2021-07-02 DIAGNOSIS — E03.9 HYPOTHYROIDISM, UNSPECIFIED TYPE: ICD-10-CM

## 2021-07-02 DIAGNOSIS — L30.9 ECZEMA, UNSPECIFIED TYPE: ICD-10-CM

## 2021-07-02 DIAGNOSIS — E55.9 VITAMIN D DEFICIENCY: ICD-10-CM

## 2021-07-02 LAB
25(OH)D3 SERPL-MCNC: 21.2 NG/ML (ref 30–100)
CA-I BLD-SCNC: 1.15 MMOL/L (ref 1.12–1.32)
MAGNESIUM SERPL-MCNC: 2.1 MG/DL (ref 1.6–2.6)
PHOSPHATE SERPL-MCNC: 4.2 MG/DL (ref 2.7–4.5)
TSH SERPL DL<=0.05 MIU/L-ACNC: 2.17 UIU/ML (ref 0.36–3.74)

## 2021-07-02 PROCEDURE — 84443 ASSAY THYROID STIM HORMONE: CPT

## 2021-07-02 PROCEDURE — 84100 ASSAY OF PHOSPHORUS: CPT

## 2021-07-02 PROCEDURE — 36415 COLL VENOUS BLD VENIPUNCTURE: CPT

## 2021-07-02 PROCEDURE — 82306 VITAMIN D 25 HYDROXY: CPT

## 2021-07-02 PROCEDURE — 82330 ASSAY OF CALCIUM: CPT

## 2021-07-02 PROCEDURE — 83735 ASSAY OF MAGNESIUM: CPT

## 2021-07-02 RX ORDER — TRIAMCINOLONE ACETONIDE 5 MG/G
1 OINTMENT TOPICAL 2 TIMES DAILY
Qty: 30 G | Refills: 0 | Status: SHIPPED | OUTPATIENT
Start: 2021-07-02 | End: 2022-06-16 | Stop reason: SDUPTHER

## 2021-07-08 DIAGNOSIS — J32.9 SINUSITIS, UNSPECIFIED CHRONICITY, UNSPECIFIED LOCATION: ICD-10-CM

## 2021-07-08 RX ORDER — MOMETASONE FUROATE 50 UG/1
SPRAY, METERED NASAL
Qty: 51 G | Refills: 3 | Status: SHIPPED | OUTPATIENT
Start: 2021-07-08

## 2021-07-11 DIAGNOSIS — F41.1 GENERALIZED ANXIETY DISORDER: ICD-10-CM

## 2021-07-11 RX ORDER — METOPROLOL TARTRATE 50 MG/1
TABLET, FILM COATED ORAL
Qty: 90 TABLET | Refills: 3 | Status: SHIPPED | OUTPATIENT
Start: 2021-07-11 | End: 2021-08-05

## 2021-07-15 ENCOUNTER — OFFICE VISIT (OUTPATIENT)
Dept: ENDOCRINOLOGY | Facility: CLINIC | Age: 56
End: 2021-07-15
Payer: COMMERCIAL

## 2021-07-15 VITALS
SYSTOLIC BLOOD PRESSURE: 100 MMHG | DIASTOLIC BLOOD PRESSURE: 80 MMHG | HEIGHT: 64 IN | WEIGHT: 181.13 LBS | BODY MASS INDEX: 30.92 KG/M2 | HEART RATE: 80 BPM

## 2021-07-15 DIAGNOSIS — E55.9 VITAMIN D DEFICIENCY: Primary | ICD-10-CM

## 2021-07-15 DIAGNOSIS — Z98.84 HISTORY OF GASTRIC BYPASS: ICD-10-CM

## 2021-07-15 DIAGNOSIS — E03.9 HYPOTHYROIDISM, UNSPECIFIED TYPE: ICD-10-CM

## 2021-07-15 DIAGNOSIS — E21.3 HYPERPARATHYROIDISM (HCC): ICD-10-CM

## 2021-07-15 PROCEDURE — 99214 OFFICE O/P EST MOD 30 MIN: CPT | Performed by: INTERNAL MEDICINE

## 2021-07-15 PROCEDURE — 3008F BODY MASS INDEX DOCD: CPT | Performed by: INTERNAL MEDICINE

## 2021-07-15 RX ORDER — ERGOCALCIFEROL (VITAMIN D2) 1250 MCG
50000 CAPSULE ORAL WEEKLY
Qty: 12 CAPSULE | Refills: 3 | Status: SHIPPED | OUTPATIENT
Start: 2021-07-15 | End: 2021-12-07 | Stop reason: SDUPTHER

## 2021-07-15 RX ORDER — LEVOTHYROXINE SODIUM 0.1 MG/1
100 TABLET ORAL DAILY
Qty: 90 TABLET | Refills: 2 | Status: SHIPPED | OUTPATIENT
Start: 2021-07-15 | End: 2022-05-23

## 2021-07-15 NOTE — PROGRESS NOTES
Chief Complaint   Patient presents with    Hypothyroidism    Hyperparathyroidism      Referring Provider  Trent Sherman Md  [address] [facility] ,  [address]     History of Present Illness:   Audi Sanches is a 55 y o  female with hypothyroidism and hyperparathyroidism seen in follow-up  She was diagnosed at the age of 26yrs  She recalls this was when she was gaining weight  She reaclls being on several dose in the past from 100-125mcg  She has a history of gastric bypass 2007  Currently, her TSH is normal on Levothyroxine 100mcg once daily  She takes this daily and does not miss does  In the past, she was on brands  She reports a hx of psoriatic arthritis and spondylithiasis  There are times when she has "swollen glands" in her throat, but a thyroid ultrasound was done in 2017 that showed an atrophied thyroid  She still worries about "lumps" in her neck     GYB: she is taking MVI, vitamin C, and quercitin supplements  She also has had significant iron deficiency from her RYGBP and has recently had iron infusions  Her PTH has been elevated, but her calciums are in the normal range  She is not consistently taking her vitamin D    Dietary Calcium intake: She is still having dental issues, and has difficulties chewing  She has yogurt and milk in coffee  Calcium/Vitamin D supplementation: does take some in the baraitric MVI  Weight bearing exercises: she reports "being on her feet," but not other exercise       Fhx: high calcium, renal stones in sister    Patient Active Problem List   Diagnosis    Anemia    Attention deficit hyperactivity disorder    Depression with anxiety    Eczema    Generalized anxiety disorder    Hypothyroidism    Leiomyoma of uterus    Masses of both breasts    Pain syndrome, chronic    Psoriasis with arthropathy ([address] )    Reactive airway disease    Vitamin B12 deficiency    Chronic GERD    Essential hypertension    Sinusitis    Iron deficiency anemia, unspecified    Health care maintenance    Postgastrectomy malabsorption    Hyperpar[facility] [facility] [address]    History of gastric bypass      Past Medical History:   Diagnosis Date    Anxiety     Disorder, per [employer]scripts    Arthritis     Disease of thyroid gland     HPV in female     Osteoarthritis       Past Surgical History:   Procedure Laterality Date     SECTION      GASTRIC BYPASS      For Morbid Obesity, per Allscripts    HIP SURGERY      MOUTH SURGERY      SINUS SURGERY        Family History   Problem Relation Age of Onset    Dementia Mother     Osteopenia Mother     Leukemia Father      Social History     Tobacco Use    Smoking status: Former Smoker     Quit date:      Years since quittin 5    Smokeless tobacco: Never Used    Tobacco comment: Never a smoker, per Allscripts   Substance Use Topics    Alcohol use: No     Allergies   Allergen Reactions    Cephalexin Rash     Reaction Date: 2011;  Cephalosporin per patient     Moxifloxacin Rash     Reaction Date: 2011;     Sulfamethoxazole-Trimethoprim Rash         Current Outpatient Medications:     amphetamine-dextroamphetamine (ADDERALL) 15 MG tablet, Take 1 tablet by mouth daily as needed, Disp: , Rfl:     buPROPion (WELLBUTRIN XL) 300 mg 24 hr tablet, Take 1 tablet by mouth daily, Disp: , Rfl:     cyanocobalamin 1,000 mcg/mL, INJECT 1 ML ONCE A WEEK X 4 TIMES THEN EVERY 2 WEEKS X 4 TIMES THEN USE 1 ML EVERY 3-4 WEEKS, Disp: 10 mL, Rfl: 3    diclofenac sodium (VOLTAREN) 1 %, USE 2 GRAMS 4 TIMES DAILY AS NEEDED FOR UPPER BODY JOINTS AND 4 GRAMS FOR LOWER BODY JOINTS, Disp: 500 g, Rfl: 1    escitalopram (LEXAPRO) 20 mg tablet, Take 1 tablet by mouth daily, Disp: , Rfl:     famotidine (PEPCID) 40 MG tablet, TAKE 1 TABLET BY MOUTH DAILY AT BEDTIME, Disp: 90 tablet, Rfl: 1    Fe-Succ Ac-C-Thre Ac-B12-FA (FERREX 150 FORTE PLUS)  MG CAPS, Take by mouth, Disp: , Rfl:     gabapentin (NEURONTIN) 100 mg capsule, Take 400 mg by mouth daily , Disp: , Rfl:     levalbuterol (Xopenex HFA) 45 mcg/act inhaler, Inhale 2 puffs every 6 (six) hours as needed for wheezing or shortness of breath (cough), Disp: 1 Inhaler, Rfl: 3    levothyroxine 100 mcg tablet, Take 1 tablet (100 mcg total) by mouth daily, Disp: 90 tablet, Rfl: 2    lisdexamfetamine (VYVANSE) 40 MG capsule, Take by mouth 2 (two) times a day, Disp: , Rfl:     meloxicam (MOBIC) 15 mg tablet, TAKE 1 TABLET ONCE A DAY AFTER FOOD AS NEEDED FOR JOINT PAIN, Disp: 90 tablet, Rfl: 1    metoprolol tartrate (LOPRESSOR) 50 mg tablet, TAKE 1 TABLET BY MOUTH EVERY DAY, Disp: 90 tablet, Rfl: 3    mometasone (NASONEX) 50 mcg/act nasal spray, SPRAY 2 SPRAYS INTO EACH NOSTRIL EVERY DAY, Disp: 51 g, Rfl: 3    montelukast (SINGULAIR) 10 mg tablet, TAKE 1 TABLET BY MOUTH DAILY AT BEDTIME, Disp: 90 tablet, Rfl: 2    pantoprazole (PROTONIX) 40 mg tablet, TAKE 1 TABLET (40 MG TOTAL) BY MOUTH DAILY BEFORE BREAKFAST TAKE 30 MINUTES BEFORE, Disp: 90 tablet, Rfl: 3    SYMBICORT 160-4 5 MCG/ACT inhaler, TAKE 2 PUFFS BY MOUTH TWICE A DAY, Disp: 3 Inhaler, Rfl: 2    SYRINGE-NEEDLE, DISP, 3 ML (B-D SYRINGE/NEEDLE 3CC/23GX1") 23G X 1" 3 ML MISC, Use  For vitamin B12 injections, Disp: 10 each, Rfl: 5    triamcinolone (KENALOG) 0 5 % ointment, APPLY 1 APPLICATION TOPICALLY 2 (TWO) TIMES A DAY TO AFFECTED AREA, Disp: 30 g, Rfl: 0    valACYclovir (VALTREX) 1,000 mg tablet, Take by mouth, Disp: , Rfl:     ergocalciferol (ERGOCALCIFEROL) 1 25 MG (51431 UT) capsule, Take 1 capsule (50,000 Units total) by mouth once a week Take one capsule by mouth once per week, Disp: 12 capsule, Rfl: 3  Review of Systems   Constitutional: Positive for fatigue  Negative for unexpected weight change  HENT: Negative for hearing loss, trouble swallowing and voice change  Eyes: Negative for visual disturbance  Respiratory: Negative for cough and shortness of breath      Cardiovascular: Palpitations: situational    Endocrine:        Dry mouth   Genitourinary: Negative for menstrual problem  Neurological: Negative for tremors  Difficulties with balance when standing on one leg   Psychiatric/Behavioral: The patient is nervous/anxious  All other systems reviewed and are negative  Physical Exam:  Body mass index is 31 09 kg/m²  /80 (BP Location: Left arm, Patient Position: Sitting, Cuff Size: Large)   Pulse 80   Ht 5' 4" (1 626 m)   Wt 82 2 kg (181 lb 2 oz)   BMI 31 09 kg/m²    Wt Readings from Last 3 Encounters:   07/15/21 82 2 kg (181 lb 2 oz)   04/21/21 82 2 kg (181 lb 4 oz)   04/29/20 82 kg (180 lb 12 4 oz)       GEN: NAD  E/n/m: mask in place, hearing intact bilat  Eyes: no stare or proptosis, EOMI  Neck: trachea midline, thyroid NT to palpation, no nodules or neck masses noted, no cervical LAD  Resp: good effort  Neuro: no tremor  MS: no c/c in digits, moves all 4 ext, nl muscle bulk, gait nl  Skin: warm and dry, no palmar erythema  Psych: nl mood and affect, no gross lapses in memory    DATA:  Labs:   Lab Results   Component Value Date    [id number] 2 170 07/02/2021         Lab Results   Component Value Date    FREET4 1 13 02/24/2021     Lab Results   Component Value Date     2 (H) 02/24/2021    CALCIUM 9 4 02/24/2021    PHOS 4 2 07/02/2021     Vitamin D 3/2020 32 4, corresponding PTH 80 4 (nl 18 4-80  1)    Radiology    Impression:  1  Vitamin D deficiency    2  Hypothyroidism, unspecified type    3  Hyperparathyroidism ([address] )    4  History of gastric bypass           Plan:    Dulce Cummins was seen today for hypothyroidism and hyperparathyroidism  Diagnoses and all orders for this visit:    Vitamin D deficiency  -     ergocalciferol (ERGOCALCIFEROL) 1 25 MG (57908 UT) capsule; Take 1 capsule (50,000 Units total) by mouth once a week Take one capsule by mouth once per week  -     Vitamin D 25 hydroxy;  Future    Hypothyroidism, unspecified type  -     TSH, 3rd generation Lab Collect; Future  -     levothyroxine 100 mcg tablet; Take 1 tablet (100 mcg total) by mouth daily    Hyperparathyroidism (HCC)  -     PTH, intact- Lab Collect; Future  -     Calcium, ionized; Future    History of gastric bypass           1  Hypothyroidism:  Continue levothyroxine 100mcg daily  Will repeat TSH in 3mos with labs for #2    2  Hyperparathyroid: Her calciums are normal, and I suspect this is related to the RYGBP  It would be easier for her to take weekly ergocalciferol caps and these are ordered  Please reteat vit D, PTH and ionized calcium in 3mos  3  Post RYGBP malabsorption: See #2, vitamin D levels ordered      Discussed with the patient and all questioned fully answered  She will call me if any problems arise  RTC in 6mos and will reassess need for endocrine visits at that time           Nimesh Nelson MD

## 2021-08-05 DIAGNOSIS — F41.1 GENERALIZED ANXIETY DISORDER: ICD-10-CM

## 2021-08-05 RX ORDER — METOPROLOL TARTRATE 50 MG/1
TABLET, FILM COATED ORAL
Qty: 180 TABLET | Refills: 3 | Status: SHIPPED | OUTPATIENT
Start: 2021-08-05 | End: 2022-02-16

## 2021-09-05 DIAGNOSIS — L40.50 PSORIASIS WITH ARTHROPATHY (HCC): ICD-10-CM

## 2021-09-05 DIAGNOSIS — J45.40 MODERATE PERSISTENT REACTIVE AIRWAY DISEASE WITHOUT COMPLICATION: ICD-10-CM

## 2021-09-07 RX ORDER — MONTELUKAST SODIUM 10 MG/1
TABLET ORAL
Qty: 90 TABLET | Refills: 2 | Status: SHIPPED | OUTPATIENT
Start: 2021-09-07 | End: 2022-06-10

## 2021-09-07 RX ORDER — MELOXICAM 15 MG/1
TABLET ORAL
Qty: 90 TABLET | Refills: 1 | Status: SHIPPED | OUTPATIENT
Start: 2021-09-07 | End: 2022-03-04

## 2021-09-09 ENCOUNTER — OFFICE VISIT (OUTPATIENT)
Dept: URGENT CARE | Facility: CLINIC | Age: 56
End: 2021-09-09
Payer: COMMERCIAL

## 2021-09-09 VITALS
BODY MASS INDEX: 30.73 KG/M2 | OXYGEN SATURATION: 99 % | HEART RATE: 63 BPM | WEIGHT: 180 LBS | RESPIRATION RATE: 14 BRPM | TEMPERATURE: 97.5 F | HEIGHT: 64 IN | DIASTOLIC BLOOD PRESSURE: 60 MMHG | SYSTOLIC BLOOD PRESSURE: 112 MMHG

## 2021-09-09 DIAGNOSIS — B00.1 RECURRENT COLD SORES: ICD-10-CM

## 2021-09-09 DIAGNOSIS — J01.90 ACUTE NON-RECURRENT SINUSITIS, UNSPECIFIED LOCATION: Primary | ICD-10-CM

## 2021-09-09 PROCEDURE — 99213 OFFICE O/P EST LOW 20 MIN: CPT | Performed by: PHYSICIAN ASSISTANT

## 2021-09-09 RX ORDER — AMOXICILLIN AND CLAVULANATE POTASSIUM 875; 125 MG/1; MG/1
1 TABLET, FILM COATED ORAL EVERY 12 HOURS SCHEDULED
Qty: 14 TABLET | Refills: 0 | Status: SHIPPED | OUTPATIENT
Start: 2021-09-09 | End: 2021-09-16

## 2021-09-09 RX ORDER — VALACYCLOVIR HYDROCHLORIDE 1 G/1
TABLET, FILM COATED ORAL
Qty: 12 TABLET | Refills: 0 | Status: SHIPPED | OUTPATIENT
Start: 2021-09-09 | End: 2022-09-09

## 2021-09-09 NOTE — PROGRESS NOTES
[facility]        NAME: Piper Tucker is a 55 y o  female  : 1965    MRN: [id number]  DATE: 2021  TIME: 2:59 PM    Assessment and Plan   Acute non-recurrent sinusitis, unspecified location [J01 90]  1  Acute non-recurrent sinusitis, unspecified location  amoxicillin-clavulanate (AUGMENTIN) 875-125 mg per tablet   2  Recurrent cold sores  valACYclovir (VALTREX) 1,000 mg tablet         Patient Instructions     Discussed condition with pt  She has acute sinusitis which I will treat with an oral abx and rec hydration, rest, discussed OTC cough/cold meds, and observation  I also gave her Rx for Valtrex for her recurrent cold sores  Should be reevaluated if condition persists/worsens  Follow up with PCP in 3-5 days  Proceed to  ER if symptoms worsen  Chief Complaint     Chief Complaint   Patient presents with    Cold Like Symptoms     x 2 wks; fully vaccinated; no known exposure; no fever    Mouth Lesions         History of Present Illness       Pt presents with 2 wk history of ST, PND, productive cough, has now progressed to sinus congestion, pain swollen glands, facial pain, sweats, chills  Denies fever, SOB/wheezing, N/V/D, or known direct exposure to COVID-19  Has taken Flonase, Singulair which she has for allergies  Has intermittent asthma and has rescue inhaler for PRN use  She is fully vaccinated for COVID-19  She also reports she has a cold sore and would like Rx for Valtrex  Does not smoke  Review of Systems   Review of Systems   Constitutional: Positive for chills and diaphoresis  Negative for fever  HENT: Positive for postnasal drip, sinus pressure, sinus pain and sore throat  Respiratory: Positive for cough  Negative for shortness of breath  Cardiovascular: Negative  Gastrointestinal: Negative  Genitourinary: Negative  Hematological: Positive for adenopathy           Current Medications       Current Outpatient Medications:    amoxicillin-clavulanate (AUGMENTIN) 875-125 mg per tablet, Take 1 tablet by mouth every 12 (twelve) hours for 7 days, Disp: 14 tablet, Rfl: 0    amphetamine-dextroamphetamine (ADDERALL) 15 MG tablet, Take 1 tablet by mouth daily as needed, Disp: , Rfl:     buPROPion (WELLBUTRIN XL) 300 mg 24 hr tablet, Take 1 tablet by mouth daily, Disp: , Rfl:     cyanocobalamin 1,000 mcg/mL, INJECT 1 ML ONCE A WEEK X 4 TIMES THEN EVERY 2 WEEKS X 4 TIMES THEN USE 1 ML EVERY 3-4 WEEKS, Disp: 10 mL, Rfl: 3    diclofenac sodium (VOLTAREN) 1 %, USE 2 GRAMS 4 TIMES DAILY AS NEEDED FOR UPPER BODY JOINTS AND 4 GRAMS FOR LOWER BODY JOINTS, Disp: 500 g, Rfl: 1    ergocalciferol (ERGOCALCIFEROL) 1 25 MG (96651 UT) capsule, Take 1 capsule (50,000 Units total) by mouth once a week Take one capsule by mouth once per week, Disp: 12 capsule, Rfl: 3    escitalopram (LEXAPRO) 20 mg tablet, Take 1 tablet by mouth daily, Disp: , Rfl:     famotidine (PEPCID) 40 MG tablet, TAKE 1 TABLET BY MOUTH DAILY AT BEDTIME, Disp: 90 tablet, Rfl: 1    Fe-Succ Ac-C-Thre Ac-B12-FA (FERREX 150 FORTE PLUS)  MG CAPS, Take by mouth, Disp: , Rfl:     gabapentin (NEURONTIN) 100 mg capsule, Take 400 mg by mouth daily , Disp: , Rfl:     levalbuterol (Xopenex HFA) 45 mcg/act inhaler, Inhale 2 puffs every 6 (six) hours as needed for wheezing or shortness of breath (cough), Disp: 1 Inhaler, Rfl: 3    levothyroxine 100 mcg tablet, Take 1 tablet (100 mcg total) by mouth daily, Disp: 90 tablet, Rfl: 2    lisdexamfetamine (VYVANSE) 40 MG capsule, Take by mouth 2 (two) times a day, Disp: , Rfl:     meloxicam (MOBIC) 15 mg tablet, TAKE 1 TABLET ONCE A DAY AFTER FOOD AS NEEDED FOR JOINT PAIN, Disp: 90 tablet, Rfl: 1    metoprolol tartrate (LOPRESSOR) 50 mg tablet, TAKE 1 TABLET BY MOUTH TWICE A DAY, Disp: 180 tablet, Rfl: 3    mometasone (NASONEX) 50 mcg/act nasal spray, SPRAY 2 SPRAYS INTO EACH NOSTRIL EVERY DAY, Disp: 51 g, Rfl: 3    montelukast (SINGULAIR) 10 mg tablet, TAKE 1 TABLET BY MOUTH EVERYDAY AT BEDTIME, Disp: 90 tablet, Rfl: 2    pantoprazole (PROTONIX) 40 mg tablet, TAKE 1 TABLET (40 MG TOTAL) BY MOUTH DAILY BEFORE BREAKFAST TAKE 30 MINUTES BEFORE, Disp: 90 tablet, Rfl: 3    SYMBICORT 160-4 5 MCG/ACT inhaler, TAKE 2 PUFFS BY MOUTH TWICE A DAY, Disp: 3 Inhaler, Rfl: 2    SYRINGE-NEEDLE, DISP, 3 ML (B-D SYRINGE/NEEDLE 3CC/23GX1") 23G X 1" 3 ML MISC, Use  For vitamin B12 injections, Disp: 10 each, Rfl: 5    triamcinolone (KENALOG) 0 5 % ointment, APPLY 1 APPLICATION TOPICALLY 2 (TWO) TIMES A DAY TO AFFECTED AREA, Disp: 30 g, Rfl: 0    valACYclovir (VALTREX) 1,000 mg tablet, Take by mouth, Disp: , Rfl:     valACYclovir (VALTREX) 1,000 mg tablet, Take 2 tablets every 12 hours x 2 doses  , Disp: 12 tablet, Rfl: 0    Current Allergies     Allergies as of 2021 - Reviewed 2021   Allergen Reaction Noted    Cephalexin Rash 2012    Moxifloxacin Rash 2012    Sulfamethoxazole-trimethoprim Rash 2012            The following portions of the patient's history were reviewed and updated as appropriate: allergies, current medications, past family history, past medical history, past social history, past surgical history and problem list      Past Medical History:   Diagnosis Date    Anxiety     Disorder, per Allscripts    Arthritis     Disease of thyroid gland     HPV in female     Osteoarthritis        Past Surgical History:   Procedure Laterality Date     SECTION      GASTRIC BYPASS      For Morbid Obesity, per Allscripts    HIP SURGERY      MOUTH SURGERY      SINUS SURGERY         Family History   Problem Relation Age of Onset    Dementia Mother     Osteopenia Mother     Leukemia Father          Medications have been verified  Objective   /60   Pulse 63   Temp 97 5 °F (36 4 °C)   Resp 14   Ht 5' 4" (1 626 m)   Wt 81 6 kg (180 lb)   SpO2 99%   BMI 30 90 kg/m²   No LMP recorded   Patient is postmenopausal        Physical Exam     Physical Exam  Vitals reviewed  Constitutional:       General: She is not in acute distress  Appearance: She is well-developed  HENT:      Right Ear: Hearing, tympanic membrane, ear canal and external ear normal       Left Ear: Hearing, tympanic membrane, ear canal and external ear normal       Nose: Mucosal edema (B/L boggy turbinates) and congestion present  Mouth/Throat:      Lips: Lesions (Left upper lip with non-draining cold sore) present  Mouth: Mucous membranes are moist       Pharynx: Posterior oropharyngeal erythema (PND) present  No oropharyngeal exudate  Tonsils: No tonsillar exudate  Cardiovascular:      Rate and Rhythm: Normal rate and regular rhythm  Pulses: Normal pulses  Heart sounds: Normal heart sounds  No murmur heard  Pulmonary:      Effort: Pulmonary effort is normal  No respiratory distress  Breath sounds: Normal breath sounds  Musculoskeletal:      Cervical back: Neck supple  Lymphadenopathy:      Cervical: No cervical adenopathy  Neurological:      Mental Status: She is alert and oriented to person, place, and time

## 2021-09-09 NOTE — PATIENT INSTRUCTIONS

## 2021-10-12 ENCOUNTER — AMB VIDEO VISIT (OUTPATIENT)
Dept: OTHER | Facility: HOSPITAL | Age: 56
End: 2021-10-12

## 2021-11-20 ENCOUNTER — IMMUNIZATIONS (OUTPATIENT)
Dept: FAMILY MEDICINE CLINIC | Facility: HOSPITAL | Age: 56
End: 2021-11-20

## 2021-11-20 DIAGNOSIS — Z23 ENCOUNTER FOR IMMUNIZATION: Primary | ICD-10-CM

## 2021-11-20 PROCEDURE — 91300 COVID-19 PFIZER VACC 0.3 ML: CPT

## 2021-11-20 PROCEDURE — 0001A COVID-19 PFIZER VACC 0.3 ML: CPT

## 2021-12-07 DIAGNOSIS — E55.9 VITAMIN D DEFICIENCY: ICD-10-CM

## 2021-12-08 ENCOUNTER — OFFICE VISIT (OUTPATIENT)
Dept: FAMILY MEDICINE CLINIC | Facility: CLINIC | Age: 56
End: 2021-12-08
Payer: COMMERCIAL

## 2021-12-08 VITALS
DIASTOLIC BLOOD PRESSURE: 80 MMHG | HEART RATE: 86 BPM | OXYGEN SATURATION: 100 % | RESPIRATION RATE: 16 BRPM | SYSTOLIC BLOOD PRESSURE: 124 MMHG | TEMPERATURE: 98.1 F | WEIGHT: 180.6 LBS | BODY MASS INDEX: 30.83 KG/M2 | HEIGHT: 64 IN

## 2021-12-08 DIAGNOSIS — J01.91 ACUTE RECURRENT SINUSITIS, UNSPECIFIED LOCATION: Primary | ICD-10-CM

## 2021-12-08 PROCEDURE — 3008F BODY MASS INDEX DOCD: CPT | Performed by: FAMILY MEDICINE

## 2021-12-08 PROCEDURE — 99213 OFFICE O/P EST LOW 20 MIN: CPT | Performed by: FAMILY MEDICINE

## 2021-12-08 PROCEDURE — 1036F TOBACCO NON-USER: CPT | Performed by: FAMILY MEDICINE

## 2021-12-08 RX ORDER — METHYLPREDNISOLONE 4 MG/1
TABLET ORAL
Qty: 21 EACH | Refills: 0 | Status: SHIPPED | OUTPATIENT
Start: 2021-12-08

## 2021-12-08 RX ORDER — AMOXICILLIN AND CLAVULANATE POTASSIUM 875; 125 MG/1; MG/1
1 TABLET, FILM COATED ORAL
Qty: 28 TABLET | Refills: 0 | Status: SHIPPED | OUTPATIENT
Start: 2021-12-08 | End: 2021-12-22

## 2021-12-08 RX ORDER — ERGOCALCIFEROL (VITAMIN D2) 1250 MCG
50000 CAPSULE ORAL WEEKLY
Qty: 12 CAPSULE | Refills: 0 | Status: SHIPPED | OUTPATIENT
Start: 2021-12-08 | End: 2021-12-22 | Stop reason: SDUPTHER

## 2021-12-22 DIAGNOSIS — E55.9 VITAMIN D DEFICIENCY: ICD-10-CM

## 2021-12-22 RX ORDER — ERGOCALCIFEROL (VITAMIN D2) 1250 MCG
50000 CAPSULE ORAL WEEKLY
Qty: 12 CAPSULE | Refills: 0 | Status: SHIPPED | OUTPATIENT
Start: 2021-12-22 | End: 2022-02-16 | Stop reason: SDUPTHER

## 2022-02-15 ENCOUNTER — APPOINTMENT (OUTPATIENT)
Dept: LAB | Facility: CLINIC | Age: 57
End: 2022-02-15
Payer: COMMERCIAL

## 2022-02-15 ENCOUNTER — OFFICE VISIT (OUTPATIENT)
Dept: ENDOCRINOLOGY | Facility: CLINIC | Age: 57
End: 2022-02-15
Payer: COMMERCIAL

## 2022-02-15 VITALS
DIASTOLIC BLOOD PRESSURE: 86 MMHG | WEIGHT: 174 LBS | SYSTOLIC BLOOD PRESSURE: 130 MMHG | BODY MASS INDEX: 29.71 KG/M2 | HEIGHT: 64 IN | HEART RATE: 62 BPM

## 2022-02-15 DIAGNOSIS — E03.9 HYPOTHYROIDISM, UNSPECIFIED TYPE: ICD-10-CM

## 2022-02-15 DIAGNOSIS — Z98.84 HISTORY OF GASTRIC BYPASS: Primary | ICD-10-CM

## 2022-02-15 DIAGNOSIS — E21.3 HYPERPARATHYROIDISM (HCC): ICD-10-CM

## 2022-02-15 DIAGNOSIS — K91.2 POSTGASTRECTOMY MALABSORPTION: ICD-10-CM

## 2022-02-15 DIAGNOSIS — E55.9 VITAMIN D DEFICIENCY: ICD-10-CM

## 2022-02-15 DIAGNOSIS — F41.1 GENERALIZED ANXIETY DISORDER: ICD-10-CM

## 2022-02-15 DIAGNOSIS — Z90.3 POSTGASTRECTOMY MALABSORPTION: ICD-10-CM

## 2022-02-15 LAB
25(OH)D3 SERPL-MCNC: 51.1 NG/ML (ref 30–100)
CA-I BLD-SCNC: 1.13 MMOL/L (ref 1.12–1.32)
PTH-INTACT SERPL-MCNC: 145.9 PG/ML (ref 18.4–80.1)
TSH SERPL DL<=0.05 MIU/L-ACNC: 1.57 UIU/ML (ref 0.36–3.74)

## 2022-02-15 PROCEDURE — 84443 ASSAY THYROID STIM HORMONE: CPT

## 2022-02-15 PROCEDURE — 99214 OFFICE O/P EST MOD 30 MIN: CPT | Performed by: INTERNAL MEDICINE

## 2022-02-15 PROCEDURE — 82330 ASSAY OF CALCIUM: CPT

## 2022-02-15 PROCEDURE — 83970 ASSAY OF PARATHORMONE: CPT

## 2022-02-15 PROCEDURE — 36415 COLL VENOUS BLD VENIPUNCTURE: CPT

## 2022-02-15 PROCEDURE — 82306 VITAMIN D 25 HYDROXY: CPT

## 2022-02-15 PROCEDURE — 1036F TOBACCO NON-USER: CPT | Performed by: INTERNAL MEDICINE

## 2022-02-15 PROCEDURE — 3008F BODY MASS INDEX DOCD: CPT | Performed by: INTERNAL MEDICINE

## 2022-02-15 NOTE — PROGRESS NOTES
Chief Complaint   Patient presents with    Hypothyroidism    Hyperparathyroidism      Referring Provider  Janelle Soto Md  [address] [facility] ,  [address]     History of Present Illness:   Paris Kelly is a 65 y o  female with hypothyroidism and hyperparathyroidism seen in follow-up  She was last in the office in July 2021  She had labs drawn today and are in progress  She is currently having dental work done and     She was diagnosed with hypothyroidism at the age of 26yrs  She recalls this was when she was gaining weight  She recalls being on several dose in the past from 100-125mcg  She has a history of gastric bypass 2007  Currently, her TSH is normal on Levothyroxine 100mcg once daily  She takes this daily and does not miss does  In the past, she was on brands  She reports a hx of psoriatic arthritis and spondylithiasis  A thyroid ultrasound was done in 2017 that showed an atrophied thyroid  She still worries about "lumps" in her neck     GYB: she is taking MVI, vitamin C, and quercitin supplements  She also has had significant iron deficiency from her RYGBP and has required iron infusions  Her PTH has been elevated, but her calciums are in the normal range  Dietary Calcium intake: She is still having dental issues, and has difficulties chewing  She is lactose intolerant  She has lactose intolerance and uses Lactaid  She has yogurt and has increased the Lactaid use, making it in puddings, cereal, and drinking this  Calcium/Vitamin D supplementation: does take some in the bariatric MVI and was given ergocalciferol [id number] weekly  She ran out of it the 3rd week in January  She felt better taking this  Weight bearing exercises: she reports feeling "more sluggish lately " She has psoriatic arthritis and has arthralgias parvin in the winter        Fhx: high calcium, renal stones in sister    Patient Active Problem List   Diagnosis    Anemia    Attention deficit hyperactivity disorder    Depression with anxiety    Eczema    Generalized anxiety disorder    Hypothyroidism    Leiomyoma of uterus    Masses of both breasts    Pain syndrome, chronic    Psoriasis with arthropathy (HCC)    Reactive airway disease    Vitamin B12 deficiency    Chronic GERD    Essential hypertension    Sinusitis    Iron deficiency anemia, unspecified    Health care maintenance    Postgastrectomy malabsorption    Hyperparathyroidism ([address] )    History of gastric bypass      Past Medical History:   Diagnosis Date    Anxiety     Disorder, per [employer]    Arthritis     Disease of thyroid gland     HPV in female     Osteoarthritis       Past Surgical History:   Procedure Laterality Date     SECTION      GASTRIC BYPASS      For Morbid Obesity, per [employer]    HIP SURGERY      MOUTH SURGERY      SINUS SURGERY        Family History   Problem Relation Age of Onset    Dementia Mother     Osteopenia Mother     Leukemia Father      Social History     Tobacco Use    Smoking status: Former Smoker     Quit date:      Years since quittin 1    Smokeless tobacco: Never Used    Tobacco comment: Never a smoker, per [employer]scripts   Substance Use Topics    Alcohol use: No     Allergies   Allergen Reactions    Cephalexin Rash     Reaction Date: 2011;  Cephalosporin per patient     Moxifloxacin Rash     Reaction Date: 2011;     Sulfamethoxazole-Trimethoprim Rash         Current Outpatient Medications:     amphetamine-dextroamphetamine (ADDERALL) 15 MG tablet, Take 1 tablet by mouth daily as needed, Disp: , Rfl:     buPROPion (WELLBUTRIN XL) 300 mg 24 hr tablet, Take 1 tablet by mouth daily, Disp: , Rfl:     cyanocobalamin 1,000 mcg/mL, INJECT 1 ML ONCE A WEEK X 4 TIMES THEN EVERY 2 WEEKS X 4 TIMES THEN USE 1 ML EVERY 3-4 WEEKS, Disp: 10 mL, Rfl: 3    ergocalciferol (ERGOCALCIFEROL) 1 25 MG (86210 UT) capsule, Take 1 capsule (50,000 Units total) by mouth once a week Take one capsule by mouth once per week, Disp: 12 capsule, Rfl: 0    escitalopram (LEXAPRO) 20 mg tablet, Take 1 tablet by mouth daily, Disp: , Rfl:     famotidine (PEPCID) 40 MG tablet, TAKE 1 TABLET BY MOUTH DAILY AT BEDTIME, Disp: 90 tablet, Rfl: 1    gabapentin (NEURONTIN) 100 mg capsule, Take 300 mg by mouth 2 (two) times a day as needed  , Disp: , Rfl:     levalbuterol (Xopenex HFA) 45 mcg/act inhaler, Inhale 2 puffs every 6 (six) hours as needed for wheezing or shortness of breath (cough), Disp: 1 Inhaler, Rfl: 3    levothyroxine 100 mcg tablet, Take 1 tablet (100 mcg total) by mouth daily, Disp: 90 tablet, Rfl: 2    lisdexamfetamine (VYVANSE) 40 MG capsule, Take by mouth 2 (two) times a day, Disp: , Rfl:     meloxicam (MOBIC) 15 mg tablet, TAKE 1 TABLET ONCE A DAY AFTER FOOD AS NEEDED FOR JOINT PAIN, Disp: 90 tablet, Rfl: 1    metoprolol tartrate (LOPRESSOR) 50 mg tablet, TAKE 1 TABLET BY MOUTH TWICE A DAY, Disp: 180 tablet, Rfl: 3    mometasone (NASONEX) 50 mcg/act nasal spray, SPRAY 2 SPRAYS INTO EACH NOSTRIL EVERY DAY, Disp: 51 g, Rfl: 3    montelukast (SINGULAIR) 10 mg tablet, TAKE 1 TABLET BY MOUTH EVERYDAY AT BEDTIME, Disp: 90 tablet, Rfl: 2    pantoprazole (PROTONIX) 40 mg tablet, TAKE 1 TABLET (40 MG TOTAL) BY MOUTH DAILY BEFORE BREAKFAST TAKE 30 MINUTES BEFORE, Disp: 90 tablet, Rfl: 3    SYMBICORT 160-4 5 MCG/ACT inhaler, TAKE 2 PUFFS BY MOUTH TWICE A DAY (Patient taking differently: daily as needed  ), Disp: 3 Inhaler, Rfl: 2    SYRINGE-NEEDLE, DISP, 3 ML (B-D SYRINGE/NEEDLE 3CC/23GX1") 23G X 1" 3 ML MISC, Use  For vitamin B12 injections, Disp: 10 each, Rfl: 5    diclofenac sodium (VOLTAREN) 1 %, USE 2 GRAMS 4 TIMES DAILY AS NEEDED FOR UPPER BODY JOINTS AND 4 GRAMS FOR LOWER BODY JOINTS (Patient not taking: Reported on 2/15/2022 ), Disp: 500 g, Rfl: 1    Fe-Succ Ac-C-Thre Ac-B12-FA (FERREX 150 FORTE PLUS)  MG CAPS, Take by mouth (Patient not taking: Reported on 2/15/2022 ), Disp: , Rfl:     methylPREDNISolone 4 MG tablet therapy pack, Use as directed on package (Patient not taking: Reported on 2/15/2022 ), Disp: 21 each, Rfl: 0    triamcinolone (KENALOG) 0 5 % ointment, APPLY 1 APPLICATION TOPICALLY 2 (TWO) TIMES A DAY TO AFFECTED AREA (Patient not taking: Reported on 2/15/2022 ), Disp: 30 g, Rfl: 0    valACYclovir (VALTREX) 1,000 mg tablet, Take by mouth (Patient not taking: Reported on 2/15/2022 ), Disp: , Rfl:     valACYclovir (VALTREX) 1,000 mg tablet, Take 2 tablets every 12 hours x 2 doses  (Patient not taking: Reported on 2/15/2022 ), Disp: 12 tablet, Rfl: 0  Review of Systems   Constitutional: Positive for fatigue  Negative for unexpected weight change  HENT: Negative for hearing loss, trouble swallowing and voice change  Eyes: Positive for visual disturbance (needs an exam)  Respiratory: Negative for cough and shortness of breath  Cardiovascular: Negative for chest pain and palpitations  Gastrointestinal: Positive for nausea  Negative for constipation, diarrhea and vomiting  Endocrine:        Dry mouth   Musculoskeletal: Positive for arthralgias  Neurological: Negative for tremors  Difficulties with balance when standing on one leg   Psychiatric/Behavioral: The patient is nervous/anxious  All other systems reviewed and are negative  Physical Exam:  Body mass index is 29 87 kg/m²    /86 (BP Location: Left arm, Patient Position: Sitting)   Pulse 62   Ht 5' 4" (1 626 m)   Wt 78 9 kg (174 lb)   BMI 29 87 kg/m²    Wt Readings from Last 3 Encounters:   02/15/22 78 9 kg (174 lb)   12/08/21 81 9 kg (180 lb 9 6 oz)   09/09/21 81 6 kg (180 lb)       GEN: NAD  E/n/m: mask in place, hearing intact bilat  Eyes: no stare or proptosis, EOMI  CV: heart reg rate s1s2 nl, no m/r/g  Resp: good effort, CTAB  Neuro: no tremor  MS: no c/c in digits, moves all 4 ext, nl muscle bulk, gait nl  Skin: warm and dry, no palmar erythema  Psych: nl mood and affect, no gross lapses in memory    DATA:  Labs:   Lab Results   Component Value Date    [id number] 2 170 07/02/2021         Lab Results   Component Value Date    FREET4 1 13 02/24/2021     Lab Results   Component Value Date     2 (H) 02/24/2021    CALCIUM 9 4 02/24/2021    PHOS 4 2 07/02/2021     Vitamin D 3/2020 32 4, corresponding PTH 80 4 (nl 18 4-80  1)  Vit 7/2021 21 2, no PTH    Radiology    Impression:  1  History of gastric bypass    2  Hypothyroidism, unspecified type    3  Postgastrectomy malabsorption           Plan:    Flor Rincon was seen today for hypothyroidism and hyperparathyroidism  Diagnoses and all orders for this visit:    History of gastric bypass    Hypothyroidism, unspecified type    Postgastrectomy malabsorption           1  Hypothyroidism:  Continue levothyroxine 100mcg daily  TSH pending    2  Hyperparathyroid: Her calciums are normal, and I suspect this is related to the RYGBP  Labs are pending  3  Post RYGBP malabsorption: See #2, vitamin D levels pending  She did like the ergocalciferol       Discussed with the patient and all questioned fully answered  She will call me if any problems arise    We will reassess need for endocrine visits after this set of labs result      Yash Atwood MD

## 2022-02-16 DIAGNOSIS — E21.3 HYPERPARATHYROIDISM (HCC): Primary | ICD-10-CM

## 2022-02-16 RX ORDER — METOPROLOL TARTRATE 50 MG/1
TABLET, FILM COATED ORAL
Qty: 90 TABLET | Refills: 3 | Status: SHIPPED | OUTPATIENT
Start: 2022-02-16

## 2022-03-03 DIAGNOSIS — L40.50 PSORIASIS WITH ARTHROPATHY (HCC): ICD-10-CM

## 2022-03-04 RX ORDER — MELOXICAM 15 MG/1
TABLET ORAL
Qty: 90 TABLET | Refills: 1 | Status: SHIPPED | OUTPATIENT
Start: 2022-03-04

## 2022-04-04 ENCOUNTER — AMB VIDEO VISIT (OUTPATIENT)
Dept: OTHER | Facility: HOSPITAL | Age: 57
End: 2022-04-04

## 2022-04-04 DIAGNOSIS — J01.40 ACUTE PANSINUSITIS, RECURRENCE NOT SPECIFIED: Primary | ICD-10-CM

## 2022-04-04 PROCEDURE — ECARE PR SL URGENT CARE VIRTUAL VISIT: Performed by: NURSE PRACTITIONER

## 2022-04-04 RX ORDER — AMOXICILLIN AND CLAVULANATE POTASSIUM 875; 125 MG/1; MG/1
1 TABLET, FILM COATED ORAL EVERY 12 HOURS SCHEDULED
Qty: 20 TABLET | Refills: 0 | Status: SHIPPED | OUTPATIENT
Start: 2022-04-04 | End: 2022-04-14

## 2022-04-05 ENCOUNTER — AMB VIDEO VISIT (OUTPATIENT)
Dept: OTHER | Facility: HOSPITAL | Age: 57
End: 2022-04-05

## 2022-04-05 NOTE — CARE ANYWHERE EVISITS
Visit Summary for [address] - Gender: Female - Date of Birth: [id number]  Date: [id number] - Duration: 5 minutes  Patient: [address]  Provider: Micaela MANUEL    Patient Contact Information  Address  [facility] [place]; [address]  [phone]    Visit Topics  Sinus infection  [Added By: Self - 2022-04-05]    Triage Questions   What is your current physical address in the event of a medical emergency? Answer []  Are you allergic to any medications? Answer []  Are you now or could you be pregnant? Answer []  Do you have any immune system compromise or chronic lung   disease? Answer []  Do you have any vulnerable family members in the home (infant, pregnant, cancer, elderly)? Answer []     Conversation Transcripts  [0A][0A] [Notification] You are connected with Traci Dowling, Urgent Care Specialist [0A][Notification] Annemarie Meigs is located in [place]  [0A][Notification] Annemarie Meigs has shared health history  Alejandra Loja  [0A]    Diagnosis  Acute pansinusitis    Procedures  Value: [id number] Code: CPT-4 UNLISTED E&M SERVICE    Medications Prescribed    No prescriptions ordered    Electronically signed by: Traci Sweeney(NPI [id number])

## 2022-04-05 NOTE — PATIENT INSTRUCTIONS
Rest and drink extra fluids  Start antibiotic  Take probiotic  Continue allergy medications  Nasal saline flushes or navage as needed  Follow up with PCP if no improvement  Go to ER with any worsening symptoms, chest pain or sob  Sinusitis   WHAT YOU NEED TO KNOW:   Sinusitis is inflammation or infection of your sinuses  Sinusitis is most often caused by a virus  Acute sinusitis may last up to 12 weeks  Chronic sinusitis lasts longer than 12 weeks  Recurrent sinusitis means you have 4 or more infections in 1 year  DISCHARGE INSTRUCTIONS:   Return to the emergency department if:   · You have trouble breathing or wheezing that is getting worse  · You have a stiff neck, a fever, or a bad headache  · You cannot open your eye  · Your eyeball bulges out or you cannot move your eye  · You are more sleepy than normal, or you notice changes in your ability to think, move, or talk  · You have swelling of your forehead or scalp  Call your doctor if:   · You have vision changes, such as double vision  · Your eye and eyelid are red, swollen, and painful  · Your symptoms do not improve or go away after 10 days  · You have nausea and are vomiting  · Your nose is bleeding  · You have questions or concerns about your condition or care  Medicines: Your symptoms may go away on their own  Your healthcare provider may recommend watchful waiting for up to 10 days before starting antibiotics  You may need any of the following:  · Acetaminophen  decreases pain and fever  It is available without a doctor's order  Ask how much to take and how often to take it  Follow directions  Read the labels of all other medicines you are using to see if they also contain acetaminophen, or ask your doctor or pharmacist  Acetaminophen can cause liver damage if not taken correctly  Do not use more than 4 grams (4,000 milligrams) total of acetaminophen in one day       · NSAIDs , such as ibuprofen, help decrease swelling, pain, and fever  This medicine is available with or without a doctor's order  NSAIDs can cause stomach bleeding or kidney problems in certain people  If you take blood thinner medicine, always ask your healthcare provider if NSAIDs are safe for you  Always read the medicine label and follow directions  · Nasal steroid sprays  may help decrease inflammation in your nose and sinuses  · Decongestants  help reduce swelling and drain mucus in the nose and sinuses  They may help you breathe easier  · Antihistamines  help dry mucus in the nose and relieve sneezing  · Antibiotics  help treat or prevent a bacterial infection  · Take your medicine as directed  Contact your healthcare provider if you think your medicine is not helping or if you have side effects  Tell him or her if you are allergic to any medicine  Keep a list of the medicines, vitamins, and herbs you take  Include the amounts, and when and why you take them  Bring the list or the pill bottles to follow-up visits  Carry your medicine list with you in case of an emergency  Self-care:   · Rinse your sinuses as directed  Use a sinus rinse device to rinse your nasal passages with a saline (salt water) solution or distilled water  Do not use tap water  This will help thin the mucus in your nose and rinse away pollen and dirt  It will also help reduce swelling so you can breathe normally  · Use a humidifier  to increase air moisture in your home  This may make it easier for you to breathe and help decrease your cough  · Sleep with your head elevated  Place an extra pillow under your head before you go to sleep to help your sinuses drain  · Drink liquids as directed  Ask your healthcare provider how much liquid to drink each day and which liquids are best for you  Liquids will thin the mucus in your nose and help it drain  Avoid drinks that contain alcohol or caffeine       · Do not smoke, and avoid secondhand smoke   Nicotine and other chemicals in cigarettes and cigars can make your symptoms worse  Ask your healthcare provider for information if you currently smoke and need help to quit  E-cigarettes or smokeless tobacco still contain nicotine  Talk to your healthcare provider before you use these products  Prevent the spread of germs:   · Wash your hands often with soap and water  Wash your hands after you use the bathroom, change a child's diaper, or sneeze  Wash your hands before you prepare or eat food  · Stay away from people who are sick  Some germs spread easily and quickly through contact  Follow up with your doctor as directed: You may be referred to an ear, nose, and throat specialist  Write down your questions so you remember to ask them during your visits  © Copyright [employer] 2022 Information is for End User's use only and may not be sold, redistributed or otherwise used for commercial purposes  All illustrations and images included in CareNotes® are the copyrighted property of A D A M , Inc  or [facility] Fede Campos   The above information is an  only  It is not intended as medical advice for individual conditions or treatments  Talk to your doctor, nurse or pharmacist before following any medical regimen to see if it is safe and effective for you

## 2022-04-05 NOTE — PROGRESS NOTES
Video Visit - Kaity Draft 65 y o  female MRN: [id number]    REQUIRED DOCUMENTATION:         1  This service was provided via [employer]  2  Provider located at [address] [place] [id number]  3  [facility] provider: KALEB Alcala  4  Identify all parties in room with patient during [facility] visit:  Patient   5  After connecting through [employer], patient was identified by name and date of birth  Patient was then informed that this was a Telemedicine visit and that the exam was being conducted confidentially over secure lines  My office door was closed  No one else was in the room  Patient acknowledged consent and understanding of privacy and security of the Telemedicine visit  I informed the patient that I have reviewed their record in Epic and presented the opportunity for them to ask any questions regarding the visit today  The patient agreed to participate  This is a 65year old female here today with video visit  She states she has had sinus symptoms that started 2 weeks ago  She states her allergies really started to kick up  She has been doing the nose spray allergy medication and inhaler  The past weekend symptoms got worse,  She developed sore throat from post nasal drip some nausea  She states these are normal symptoms  Sinus pressure around the nose  She does a nasal rinse and navege and had green thick drainge  Some chills no known fever  No chest pain or sob  She has been on Augmentin which work well for sinus infections  Review of Systems   Constitutional: Negative for activity change, chills, fatigue and fever  HENT: Positive for congestion, rhinorrhea, sinus pressure, sinus pain and sore throat  Respiratory: Positive for cough  Negative for shortness of breath and wheezing  Cardiovascular: Negative  Gastrointestinal: Positive for nausea  Neurological: Negative  Psychiatric/Behavioral: Negative        Physical Exam  Constitutional:       General: She is not in acute distress  Appearance: Normal appearance  She is not ill-appearing or toxic-appearing  HENT:      Head: Normocephalic and atraumatic  Comments: Frontal and maxillary sinus pressure  Nose: Congestion present  Eyes:      Conjunctiva/sclera: Conjunctivae normal    Pulmonary:      Effort: Pulmonary effort is normal  No respiratory distress  Skin:     Comments: No rash on head or neck   Neurological:      Mental Status: She is alert and oriented to person, place, and time  Psychiatric:         Mood and Affect: Mood normal          Behavior: Behavior normal          Thought Content: Thought content normal          Judgment: Judgment normal        Diagnoses and all orders for this visit:    Acute pansinusitis, recurrence not specified  -     amoxicillin-clavulanate (AUGMENTIN) 875-125 mg per tablet; Take 1 tablet by mouth every 12 (twelve) hours for 10 days      Patient Instructions     Rest and drink extra fluids  Start antibiotic  Take probiotic  Continue allergy medications  Nasal saline flushes or navage as needed  Follow up with PCP if no improvement  Go to ER with any worsening symptoms, chest pain or sob  Sinusitis   WHAT YOU NEED TO KNOW:   Sinusitis is inflammation or infection of your sinuses  Sinusitis is most often caused by a virus  Acute sinusitis may last up to 12 weeks  Chronic sinusitis lasts longer than 12 weeks  Recurrent sinusitis means you have 4 or more infections in 1 year  DISCHARGE INSTRUCTIONS:   Return to the emergency department if:   · You have trouble breathing or wheezing that is getting worse  · You have a stiff neck, a fever, or a bad headache  · You cannot open your eye  · Your eyeball bulges out or you cannot move your eye  · You are more sleepy than normal, or you notice changes in your ability to think, move, or talk  · You have swelling of your forehead or scalp      Call your doctor if:   · You have vision changes, such as double vision  · Your eye and eyelid are red, swollen, and painful  · Your symptoms do not improve or go away after 10 days  · You have nausea and are vomiting  · Your nose is bleeding  · You have questions or concerns about your condition or care  Medicines: Your symptoms may go away on their own  Your healthcare provider may recommend watchful waiting for up to 10 days before starting antibiotics  You may need any of the following:  · Acetaminophen  decreases pain and fever  It is available without a doctor's order  Ask how much to take and how often to take it  Follow directions  Read the labels of all other medicines you are using to see if they also contain acetaminophen, or ask your doctor or pharmacist  Acetaminophen can cause liver damage if not taken correctly  Do not use more than 4 grams (4,000 milligrams) total of acetaminophen in one day  · NSAIDs , such as ibuprofen, help decrease swelling, pain, and fever  This medicine is available with or without a doctor's order  NSAIDs can cause stomach bleeding or kidney problems in certain people  If you take blood thinner medicine, always ask your healthcare provider if NSAIDs are safe for you  Always read the medicine label and follow directions  · Nasal steroid sprays  may help decrease inflammation in your nose and sinuses  · Decongestants  help reduce swelling and drain mucus in the nose and sinuses  They may help you breathe easier  · Antihistamines  help dry mucus in the nose and relieve sneezing  · Antibiotics  help treat or prevent a bacterial infection  · Take your medicine as directed  Contact your healthcare provider if you think your medicine is not helping or if you have side effects  Tell him or her if you are allergic to any medicine  Keep a list of the medicines, vitamins, and herbs you take  Include the amounts, and when and why you take them   Bring the list or the pill bottles to follow-up visits  Carry your medicine list with you in case of an emergency  Self-care:   · Rinse your sinuses as directed  Use a sinus rinse device to rinse your nasal passages with a saline (salt water) solution or distilled water  Do not use tap water  This will help thin the mucus in your nose and rinse away pollen and dirt  It will also help reduce swelling so you can breathe normally  · Use a humidifier  to increase air moisture in your home  This may make it easier for you to breathe and help decrease your cough  · Sleep with your head elevated  Place an extra pillow under your head before you go to sleep to help your sinuses drain  · Drink liquids as directed  Ask your healthcare provider how much liquid to drink each day and which liquids are best for you  Liquids will thin the mucus in your nose and help it drain  Avoid drinks that contain alcohol or caffeine  · Do not smoke, and avoid secondhand smoke  Nicotine and other chemicals in cigarettes and cigars can make your symptoms worse  Ask your healthcare provider for information if you currently smoke and need help to quit  E-cigarettes or smokeless tobacco still contain nicotine  Talk to your healthcare provider before you use these products  Prevent the spread of germs:   · Wash your hands often with soap and water  Wash your hands after you use the bathroom, change a child's diaper, or sneeze  Wash your hands before you prepare or eat food  · Stay away from people who are sick  Some germs spread easily and quickly through contact  Follow up with your doctor as directed: You may be referred to an ear, nose, and throat specialist  Write down your questions so you remember to ask them during your visits  © Copyright [employer] 2022 Information is for End User's use only and may not be sold, redistributed or otherwise used for commercial purposes   All illustrations and images included in [facility] are the copyrighted property of [employer]  or [facility] Fede Campos   The above information is an  only  It is not intended as medical advice for individual conditions or treatments  Talk to your doctor, nurse or pharmacist before following any medical regimen to see if it is safe and effective for you  Follow up with PCP if not improved, if symptoms are worse, go to the ER

## 2022-04-11 DIAGNOSIS — K21.9 CHRONIC GERD: ICD-10-CM

## 2022-04-11 RX ORDER — PANTOPRAZOLE SODIUM 40 MG/1
40 TABLET, DELAYED RELEASE ORAL
Qty: 90 TABLET | Refills: 3 | Status: SHIPPED | OUTPATIENT
Start: 2022-04-11

## 2022-05-22 DIAGNOSIS — E03.9 HYPOTHYROIDISM, UNSPECIFIED TYPE: ICD-10-CM

## 2022-05-23 RX ORDER — LEVOTHYROXINE SODIUM 0.1 MG/1
TABLET ORAL
Qty: 90 TABLET | Refills: 2 | Status: SHIPPED | OUTPATIENT
Start: 2022-05-23 | End: 2022-06-22 | Stop reason: SDUPTHER

## 2022-06-10 DIAGNOSIS — J45.40 MODERATE PERSISTENT REACTIVE AIRWAY DISEASE WITHOUT COMPLICATION: ICD-10-CM

## 2022-06-10 RX ORDER — MONTELUKAST SODIUM 10 MG/1
TABLET ORAL
Qty: 90 TABLET | Refills: 2 | Status: SHIPPED | OUTPATIENT
Start: 2022-06-10

## 2022-06-15 ENCOUNTER — APPOINTMENT (OUTPATIENT)
Dept: LAB | Facility: CLINIC | Age: 57
End: 2022-06-15
Payer: COMMERCIAL

## 2022-06-15 DIAGNOSIS — E21.3 HYPERPARATHYROIDISM (HCC): ICD-10-CM

## 2022-06-15 LAB
CALCIUM 24H UR-MCNC: <92.5 MG/24 HRS (ref 42–353)
CREAT 24H UR-MRATE: 1.1 G/24HR (ref 0.6–1.8)
SPECIMEN VOL UR: 1850 ML
SPECIMEN VOL UR: 1850 ML

## 2022-06-15 PROCEDURE — 82340 ASSAY OF CALCIUM IN URINE: CPT

## 2022-06-15 PROCEDURE — 82570 ASSAY OF URINE CREATININE: CPT

## 2022-06-16 DIAGNOSIS — L30.9 ECZEMA, UNSPECIFIED TYPE: ICD-10-CM

## 2022-06-16 RX ORDER — TRIAMCINOLONE ACETONIDE 5 MG/G
1 OINTMENT TOPICAL 2 TIMES DAILY
Qty: 30 G | Refills: 0 | Status: SHIPPED | OUTPATIENT
Start: 2022-06-16

## 2022-06-22 DIAGNOSIS — E03.9 HYPOTHYROIDISM, UNSPECIFIED TYPE: ICD-10-CM

## 2022-06-22 RX ORDER — LEVOTHYROXINE SODIUM 0.1 MG/1
100 TABLET ORAL DAILY
Qty: 90 TABLET | Refills: 2 | Status: SHIPPED | OUTPATIENT
Start: 2022-06-22

## 2022-08-16 ENCOUNTER — OFFICE VISIT (OUTPATIENT)
Dept: ENDOCRINOLOGY | Facility: CLINIC | Age: 57
End: 2022-08-16
Payer: COMMERCIAL

## 2022-08-16 VITALS
WEIGHT: 181.5 LBS | SYSTOLIC BLOOD PRESSURE: 122 MMHG | BODY MASS INDEX: 30.99 KG/M2 | DIASTOLIC BLOOD PRESSURE: 80 MMHG | HEART RATE: 62 BPM | HEIGHT: 64 IN

## 2022-08-16 DIAGNOSIS — E03.9 HYPOTHYROIDISM, UNSPECIFIED TYPE: Primary | ICD-10-CM

## 2022-08-16 DIAGNOSIS — E21.3 HYPERPARATHYROIDISM (HCC): ICD-10-CM

## 2022-08-16 DIAGNOSIS — Z90.3 POSTGASTRECTOMY MALABSORPTION: ICD-10-CM

## 2022-08-16 DIAGNOSIS — E55.9 VITAMIN D DEFICIENCY: ICD-10-CM

## 2022-08-16 DIAGNOSIS — K91.2 POSTGASTRECTOMY MALABSORPTION: ICD-10-CM

## 2022-08-16 PROCEDURE — 99214 OFFICE O/P EST MOD 30 MIN: CPT | Performed by: INTERNAL MEDICINE

## 2022-08-16 RX ORDER — CYCLOSPORINE 0 G/ML
SOLUTION/ DROPS OPHTHALMIC; TOPICAL
COMMUNITY
Start: 2022-06-13

## 2022-08-16 RX ORDER — ERGOCALCIFEROL 1.25 MG/1
CAPSULE ORAL
Qty: 6 CAPSULE | Refills: 2 | Status: SHIPPED | OUTPATIENT
Start: 2022-08-16

## 2022-08-16 NOTE — PROGRESS NOTES
Chief Complaint   Patient presents with    Hyperparathyroidism    Hypothyroidism      Referring Provider  No referring provider defined for this encounter  History of Present Illness:   Benny Goncalves is a 65 y o  female with hypothyroidism and hyperparathyroidism seen in follow-up  She was last in the office in Feb 2022    She was diagnosed with hypothyroidism at the age of 26yrs  She recalls this was when she was gaining weight  She recalls being on several dose in the past from 100-125mcg  She has a history of gastric bypass 2007  Currently, her Levothyroxine dose is 100mcg once daily  She takes this daily and does not miss does  In the past, she was on brands in the past    She reports a hx of psoriatic arthritis and spondylithiasis  She has dry mouth due to Sjogren's syndrome  She has swallowing issues when anxiety  A thyroid ultrasound was done in 2017 that showed an atrophied thyroid  GYB: she is taking MVI, vitamin C, and quercitin supplements  She also has had significant iron deficiency from her RYGBP and has required iron infusions in the past     Her PTH has been elevated, but her calciums are in the normal range  Dietary Calcium intake: She got dental implants and has improved  She has lactose intolerance and uses Lactaid  She has yogurt and has increased the Lactaid use, making it in puddings, cereal, and drinking this  Calcium/Vitamin D supplementation: does take some in the bariatric MVI and was given ergocalciferol [id number] weekly  She was then taking this every 2 weeks  She likes taking this and feels better with vit D supplementation       24h urine was low when checked in June 2022      Fhx: high calcium, renal stones in sister    Patient Active Problem List   Diagnosis    Anemia    Attention deficit hyperactivity disorder    Depression with anxiety    Eczema    Generalized anxiety disorder    Hypothyroidism    Leiomyoma of uterus    Masses of both breasts    Pain syndrome, chronic    Psoriasis with arthropathy (HCC)    Reactive airway disease    Vitamin B12 deficiency    Chronic GERD    Essential hypertension    Sinusitis    Iron deficiency anemia, unspecified    Health care maintenance    Postgastrectomy malabsorption    Hyperparathyroidism ([address] )    History of gastric bypass      Past Medical History:   Diagnosis Date    Anxiety     Disorder, per Allscripts    Arthritis     Disease of thyroid gland     HPV in female     Osteoarthritis       Past Surgical History:   Procedure Laterality Date     SECTION      GASTRIC BYPASS      For Morbid Obesity, per Allscripts    HIP SURGERY      MOUTH SURGERY      SINUS SURGERY        Family History   Problem Relation Age of Onset    Dementia Mother     Osteopenia Mother     Leukemia Father     Diabetes unspecified Father     Hypothyroidism Sister      Social History     Tobacco Use    Smoking status: Former Smoker     Packs/day: 0 50     Years: 5 00     Pack years: 2 50     Types: Cigarettes     Start date: 1993     Quit date: 2000     Years since quittin 2    Smokeless tobacco: Former User    Tobacco comment: Never a smoker, per Allscripts   Substance Use Topics    Alcohol use: No     Allergies   Allergen Reactions    Cephalexin Rash     Reaction Date: 2011;  Cephalosporin per patient     Moxifloxacin Rash     Reaction Date: 2011;     Sulfamethoxazole-Trimethoprim Rash         Current Outpatient Medications:     amphetamine-dextroamphetamine (ADDERALL) 15 MG tablet, Take 1 tablet by mouth daily as needed, Disp: , Rfl:     buPROPion (WELLBUTRIN XL) 300 mg 24 hr tablet, Take 1 tablet by mouth daily, Disp: , Rfl:     cyanocobalamin 1,000 mcg/mL, INJECT 1 ML ONCE A WEEK X 4 TIMES THEN EVERY 2 WEEKS X 4 TIMES THEN USE 1 ML EVERY 3-4 WEEKS, Disp: 10 mL, Rfl: 3    cycloSPORINE, PF, (Cequa) 0 09 % SOLN, , Disp: , Rfl:     ergocalciferol (VITAMIN D2) 50,000 units, 1 cap every 2 weeks, Disp: 6 capsule, Rfl: 2    escitalopram (LEXAPRO) 20 mg tablet, Take 1 tablet by mouth daily, Disp: , Rfl:     famotidine (PEPCID) 40 MG tablet, TAKE 1 TABLET BY MOUTH DAILY AT BEDTIME, Disp: 90 tablet, Rfl: 1    gabapentin (NEURONTIN) 100 mg capsule, Take 300 mg by mouth 2 (two) times a day as needed  , Disp: , Rfl:     levalbuterol (Xopenex HFA) 45 mcg/act inhaler, Inhale 2 puffs every 6 (six) hours as needed for wheezing or shortness of breath (cough), Disp: 1 Inhaler, Rfl: 3    levothyroxine 100 mcg tablet, Take 1 tablet (100 mcg total) by mouth daily, Disp: 90 tablet, Rfl: 2    lisdexamfetamine (VYVANSE) 40 MG capsule, Take by mouth 2 (two) times a day, Disp: , Rfl:     meloxicam (MOBIC) 15 mg tablet, TAKE 1 TABLET ONCE A DAY AFTER FOOD AS NEEDED FOR JOINT PAIN, Disp: 90 tablet, Rfl: 1    metoprolol tartrate (LOPRESSOR) 50 mg tablet, TAKE 1 TABLET BY MOUTH EVERY DAY, Disp: 90 tablet, Rfl: 3    mometasone (NASONEX) 50 mcg/act nasal spray, SPRAY 2 SPRAYS INTO EACH NOSTRIL EVERY DAY, Disp: 51 g, Rfl: 3    montelukast (SINGULAIR) 10 mg tablet, TAKE 1 TABLET BY MOUTH EVERYDAY AT BEDTIME, Disp: 90 tablet, Rfl: 2    pantoprazole (PROTONIX) 40 mg tablet, TAKE 1 TABLET (40 MG TOTAL) BY MOUTH DAILY BEFORE BREAKFAST TAKE 30 MINUTES BEFORE, Disp: 90 tablet, Rfl: 3    SYRINGE-NEEDLE, DISP, 3 ML (B-D SYRINGE/NEEDLE 3CC/23GX1") 23G X 1" 3 ML MISC, Use  For vitamin B12 injections, Disp: 10 each, Rfl: 5    triamcinolone (KENALOG) 0 5 % ointment, Apply 1 application topically 2 (two) times a day To affected area, Disp: 30 g, Rfl: 0    diclofenac sodium (VOLTAREN) 1 %, USE 2 GRAMS 4 TIMES DAILY AS NEEDED FOR UPPER BODY JOINTS AND 4 GRAMS FOR LOWER BODY JOINTS (Patient not taking: No sig reported), Disp: 500 g, Rfl: 1    Fe-Succ Ac-C-Thre Ac-B12-FA (FERREX 150 FORTE PLUS)  MG CAPS, Take by mouth (Patient not taking: No sig reported), Disp: , Rfl:     methylPREDNISolone 4 MG tablet therapy pack, Use as directed on package (Patient not taking: No sig reported), Disp: 21 each, Rfl: 0    SYMBICORT 160-4 5 MCG/ACT inhaler, TAKE 2 PUFFS BY MOUTH TWICE A DAY (Patient not taking: Reported on 8/16/2022), Disp: 3 Inhaler, Rfl: 2    valACYclovir (VALTREX) 1,000 mg tablet, Take by mouth (Patient not taking: No sig reported), Disp: , Rfl:     valACYclovir (VALTREX) 1,000 mg tablet, Take 2 tablets every 12 hours x 2 doses  (Patient not taking: No sig reported), Disp: 12 tablet, Rfl: 0  Review of Systems   Constitutional: Positive for fatigue  Negative for unexpected weight change  HENT: Negative for hearing loss, trouble swallowing and voice change  Eyes: Positive for visual disturbance (dry eyes, cannot drive at night)  Respiratory: Negative for cough and shortness of breath  Cardiovascular: Negative for chest pain and palpitations  Gastrointestinal: Negative for constipation and diarrhea  Endocrine:        Dry mouth   Musculoskeletal: Positive for arthralgias  Skin:        Thinner hair   Neurological:        Difficulties with balance   Psychiatric/Behavioral: The patient is nervous/anxious  All other systems reviewed and are negative  Physical Exam:  Body mass index is 31 15 kg/m²    /80   Pulse 62   Ht 5' 4" (1 626 m)   Wt 82 3 kg (181 lb 8 oz)   BMI 31 15 kg/m²    Wt Readings from Last 3 Encounters:   08/16/22 82 3 kg (181 lb 8 oz)   02/15/22 78 9 kg (174 lb)   12/08/21 81 9 kg (180 lb 9 6 oz)       GEN: NAD  E/n/m: nl facies, hearing intact bilat, lips nl  Eyes: no stare or proptosis, EOMI  Neuro: no tremor  MS: no c/c in digits, moves all 4 ext, nl muscle bulk, gait nl  Skin: warm and dry, no palmar erythema  Psych: nl mood and affect, no gross lapses in memory    DATA:  Labs:   Lab Results   Component Value Date    FKK1ZNSFDSJQ 1 570 02/15/2022         Lab Results   Component Value Date    FREET4 1 13 02/24/2021     Lab Results   Component Value Date     9 (H) 02/15/2022    CALCIUM 9 4 02/24/2021    PHOS 4 2 07/02/2021     Vitamin D 3/2020 32 4, corresponding PTH 80 4 (nl 18 4-80  1)  Vit 7/2021 21 2, no PTH    Radiology    Impression:  1  Hypothyroidism, unspecified type    2  Postgastrectomy malabsorption    3  Hyperparathyroidism ([address] )    4  Vitamin D deficiency           Plan:    Nelly Chen was seen today for hyperparathyroidism and hypothyroidism  Diagnoses and all orders for this visit:    Hypothyroidism, unspecified type    Postgastrectomy malabsorption    Hyperparathyroidism ([facility] [address] )  -     PTH, intact- Lab Collect; Future  -     Calcium, ionized; Future    Vitamin D deficiency  -     Vitamin D 25 hydroxy; Future  -     ergocalciferol (VITAMIN D2) 50,000 units; 1 cap every 2 weeks           1  Hypothyroidism:  Continue levothyroxine 100mcg daily  TSH normal in Feb 2022    2  Hyperparathyroid: Her calciums are normal, and I suspect this is related to the RYGBP  Will repeat PTH and ionized calcium with vit D    3  Post RYGBP malabsorption: refill ergocalciferol [id number] every 2 weeks      Discussed with the patient and all questioned fully answered  She will call me if any problems arise    We will reassess need for endocrine visits after this set of labs result      Orly Rodriguez MD

## 2022-08-19 ENCOUNTER — OFFICE VISIT (OUTPATIENT)
Dept: FAMILY MEDICINE CLINIC | Facility: CLINIC | Age: 57
End: 2022-08-19
Payer: COMMERCIAL

## 2022-08-19 VITALS
TEMPERATURE: 97.8 F | SYSTOLIC BLOOD PRESSURE: 102 MMHG | DIASTOLIC BLOOD PRESSURE: 60 MMHG | BODY MASS INDEX: 30.73 KG/M2 | HEART RATE: 75 BPM | WEIGHT: 180 LBS | HEIGHT: 64 IN | OXYGEN SATURATION: 100 % | RESPIRATION RATE: 18 BRPM

## 2022-08-19 DIAGNOSIS — Z12.31 ENCOUNTER FOR SCREENING MAMMOGRAM FOR BREAST CANCER: ICD-10-CM

## 2022-08-19 DIAGNOSIS — Z90.3 POSTGASTRECTOMY MALABSORPTION: ICD-10-CM

## 2022-08-19 DIAGNOSIS — Z00.00 ENCOUNTER FOR WELLNESS EXAMINATION IN ADULT: Primary | ICD-10-CM

## 2022-08-19 DIAGNOSIS — K91.2 POSTGASTRECTOMY MALABSORPTION: ICD-10-CM

## 2022-08-19 DIAGNOSIS — L40.50 PSORIASIS WITH ARTHROPATHY (HCC): ICD-10-CM

## 2022-08-19 DIAGNOSIS — E21.3 HYPERPARATHYROIDISM (HCC): ICD-10-CM

## 2022-08-19 DIAGNOSIS — E03.9 HYPOTHYROIDISM, UNSPECIFIED TYPE: ICD-10-CM

## 2022-08-19 DIAGNOSIS — I10 ESSENTIAL HYPERTENSION: ICD-10-CM

## 2022-08-19 DIAGNOSIS — Z12.11 COLON CANCER SCREENING: ICD-10-CM

## 2022-08-19 DIAGNOSIS — K21.9 CHRONIC GERD: ICD-10-CM

## 2022-08-19 PROCEDURE — 99396 PREV VISIT EST AGE 40-64: CPT | Performed by: FAMILY MEDICINE

## 2022-08-19 RX ORDER — LISDEXAMFETAMINE DIMESYLATE 50 MG
50 CAPSULE ORAL 2 TIMES DAILY
COMMUNITY
Start: 2022-08-16

## 2022-08-19 NOTE — PROGRESS NOTES
FAMILY PRACTICE OFFICE VISIT       NAME: Kenia Srivastava  AGE: 65 y o  SEX: female       : 1965        MRN: [id number]        Assessment and Plan     1  Encounter for wellness examination in adult  -     Ambulatory referral to Obstetrics / Gynecology; Future    2  Encounter for screening mammogram for breast cancer  -     Mammo screening bilateral w 3d & cad; Future; Expected date: 2022    3  Hyperparathyroidism [facility])  Assessment & Plan:  Patient remains on vitamin D2 supplements, 59751 units every 2 weeks as per endocrinology  No hypercalcemia  PTH has been on the rise  Patient is concerned about ongoing elevated PTH level and is requesting to pursue further workup  She will proceed with parathyroid scan    Orders:  -     DXA bone density spine hip and pelvis; Future; Expected date: 2022  -     NM parathyroid scan w spect; Future; Expected date: 2022    4  Postgastrectomy malabsorption  Assessment & Plan:  Continue healthy diet and exercise  Proceed with blood work    Orders:  -     Comprehensive metabolic panel; Future  -     Lipid Panel with Direct LDL reflex; Future  -     Vitamin B12; Future  -     Hemoglobin A1C; Future  -     CBC and differential; Future  -     Iron Panel (Includes Ferritin, Iron Sat%, Iron, and TIBC); Future    5  Hypothyroidism, unspecified type  Assessment & Plan:  Continue levothyroxine 100 mcg daily, recent TSH is normal   Proceed with blood work  Previous thyroid ultrasound revealed heterogenous atrophic thyroid gland likely due to autoimmune thyroiditis (2017)    Orders:  -     TSH, 3rd generation; Future  -     T4, free; Future    6  Psoriasis with arthropathy [facility])  Assessment & Plan:  Patient reports that overall generalized myalgias and arthralgias have been well controlled  She remains on meloxicam p r n  Referral to [facility] at her consideration  Orders:  -     Ambulatory referral to Rheumatology; Future    7  Essential hypertension  Assessment & Plan:  Well controlled, continue metoprolol      8  Colon cancer screening  -     Cologuard    9  Chronic GERD  Assessment & Plan:  Doing well on Protonix 40 mg daily         Patient is due for mammogram, gyn exam and Cologuard  I advised to schedule annual eye exam   Proceed with blood work  Assessment and plan as outlined above  Costochondritis left ribcage after repetitive lifting a few weeks ago  Observation advised, reassurance provided    There are no Patient Instructions on file for this visit  No follow-ups on file  Discussed with the patient and all questioned fully answered  She will call me if any problems arise  [employer] software was used to dictate this note  It may contain errors with dictating incorrect words/spelling  Please contact provider directly with any questions  Chief Complaint     Chief Complaint   Patient presents with    Physical Exam     Yearly    Care Gap Colonoscopy     Pt is due    Gynecologic Exam     Pt is due    Mammogram Due       History of Present Illness      Patient presents for annual well exam   She is past due screenings and blood work  She was seen by [facility] on a few occasions due to concern of elevated and rising PTH  Endocrinology believes that hyperparathyroidism is related to history of gastric bypass surgery  Patient's calcium has been normal   PTH has been steadily rising  She remains on levothyroxine for hypothyroidism  Patient with history of connective tissue disease, she was evaluated by few rheumatologist in the past and felt uncomfortable using DMARDs  She has been managing her arthritic pain relatively well  She remains under ongoing care of Psychiatry for treatment of anxiety and ADHD      Gynecologic Exam        Review of Systems   Review of Systems    Active Problem List     Patient Active Problem List   Diagnosis    Anemia    Attention deficit hyperactivity disorder    Depression with anxiety    Eczema    Generalized anxiety disorder    Hypothyroidism    Leiomyoma of uterus    Psoriasis with arthropathy (HCC)    Reactive airway disease    Chronic GERD    Essential hypertension    Sinusitis    Iron deficiency anemia, unspecified    Postgastrectomy malabsorption    Hyperparathyroidism ([address] )    History of gastric bypass    Encounter for wellness examination in adult       Past Medical History:  Past Medical History:   Diagnosis Date    Anxiety     Disorder, per Allscripts    Arthritis     Disease of thyroid gland     HPV in female     Osteoarthritis        Past Surgical History:  Past Surgical History:   Procedure Laterality Date     SECTION      GASTRIC BYPASS      For Morbid Obesity, per Allscripts    HIP SURGERY      MOUTH SURGERY      SINUS SURGERY         Family History:  Family History   Problem Relation Age of Onset    Dementia Mother     Osteopenia Mother     Leukemia Father     Diabetes unspecified Father     Hypothyroidism Sister        Social History:  Social History     Socioeconomic History    Marital status: /Civil Union     Spouse name: Not on file    Number of children: 2    Years of education: Not on file    Highest education level: Not on file   Occupational History    Occupation: currently works full-time   Tobacco Use    Smoking status: Former Smoker     Packs/day: 0 50     Years: 5 00     Pack years: 2 50     Types: Cigarettes     Start date: 1993     Quit date: 2000     Years since quittin 2    Smokeless tobacco: Former User    Tobacco comment: Never a smoker, per Allscripts   Vaping Use    Vaping Use: Never used   Substance and Sexual Activity    Alcohol use: No    Drug use: No    Sexual activity: Not Currently     Partners: Male     Birth control/protection: Post-menopausal   Other Topics Concern    Not on file   Social History Narrative        2 children    Working currently Always uses seat belt    College student    Exercise frequency     Feels safe at home     Social Determinants of Health     Financial Resource Strain: Not on file   Food Insecurity: Not on file   Transportation Needs: Not on file   Physical Activity: Not on file   Stress: Not on file   Social Connections: Not on file   Intimate Partner Violence: Not on file   Housing Stability: Not on file         Objective     Vitals:    08/19/22 1336   BP: 102/60   BP Location: Left arm   Patient Position: Sitting   Cuff Size: Standard   Pulse: 75   Resp: 18   Temp: 97 8 °F (36 6 °C)   TempSrc: Temporal   SpO2: 100%   Weight: 81 6 kg (180 lb)   Height: 5' 4" (1 626 m)       Wt Readings from Last 3 Encounters:   08/19/22 81 6 kg (180 lb)   08/16/22 82 3 kg (181 lb 8 oz)   02/15/22 78 9 kg (174 lb)       Physical Exam  Vitals and nursing note reviewed  Constitutional:       General: She is not in acute distress  Appearance: Normal appearance  She is well-developed  She is not ill-appearing  HENT:      Head: Normocephalic and atraumatic  Eyes:      General: No scleral icterus  Conjunctiva/sclera: Conjunctivae normal    Neck:      Thyroid: No thyromegaly  Vascular: No carotid bruit  Cardiovascular:      Rate and Rhythm: Normal rate and regular rhythm  Heart sounds: Normal heart sounds  No murmur heard  Pulmonary:      Effort: Pulmonary effort is normal  No respiratory distress  Breath sounds: Normal breath sounds  No wheezing  Abdominal:      General: Bowel sounds are normal  There is no distension or abdominal bruit  Palpations: Abdomen is soft  Tenderness: There is no abdominal tenderness  Musculoskeletal:         General: Normal range of motion  Cervical back: Neck supple  No rigidity  Right lower leg: No edema  Left lower leg: No edema  Skin:     General: Skin is warm  Findings: No rash  Neurological:      General: No focal deficit present        Mental Status: She is alert and oriented to person, place, and time  Cranial Nerves: No cranial nerve deficit  Coordination: Coordination normal    Psychiatric:         Mood and Affect: Mood normal          Behavior: Behavior normal          Thought Content:  Thought content normal           Pertinent Laboratory/Diagnostic Studies:    Lab Results   Component Value Date    WBC 7 80 02/24/2021    HGB 11 6 02/24/2021    HCT 37 1 02/24/2021    MCV 98 02/24/2021     02/24/2021       No results found for: TSH    Lab Results   Component Value Date    CHOL 255 04/06/2015     Lab Results   Component Value Date    TRIG 103 07/01/2020     Lab Results   Component Value Date    HDL 70 07/01/2020     Lab Results   Component Value Date    LDLCALC 117 (H) 07/01/2020     Lab Results   Component Value Date    HGBA1C 5 7 (H) 03/09/2020     Lab Results   Component Value Date    SODIUM 141 02/24/2021    K 4 4 02/24/2021     02/24/2021    CO2 31 02/24/2021    ANIONGAP 9 11/16/2015    AGAP 4 02/24/2021    BUN 20 02/24/2021    CREATININE 0 94 02/24/2021    GLUC 105 03/09/2020    GLUF 116 (H) 02/24/2021    CALCIUM 9 4 02/24/2021    AST 31 07/01/2020    ALT 48 07/01/2020    ALKPHOS 85 07/01/2020    PROT 7 9 11/16/2015    TP 6 7 07/01/2020    BILITOT 0 35 11/16/2015    TBILI 0 28 07/01/2020    EGFR 69 02/24/2021       Orders Placed This Encounter   Procedures    Cologuard    Mammo screening bilateral w 3d & cad    DXA bone density spine hip and pelvis    NM parathyroid scan w spect    Comprehensive metabolic panel    Lipid Panel with Direct LDL reflex    TSH, 3rd generation    T4, free    Vitamin B12    Hemoglobin A1C    CBC and differential    Ambulatory referral to Obstetrics / Gynecology    Ambulatory referral to Rheumatology       ALLERGIES:  Allergies   Allergen Reactions    Cephalexin Rash     Reaction Date: 28Jul2011;  Cephalosporin per patient     Moxifloxacin Rash     Reaction Date: 18Apr2011;     Sulfamethoxazole-Trimethoprim Rash       Current Medications     Current Outpatient Medications   Medication Sig Dispense Refill    amphetamine-dextroamphetamine (ADDERALL) 15 MG tablet Take 1 tablet by mouth daily as needed      buPROPion (WELLBUTRIN XL) 300 mg 24 hr tablet Take 1 tablet by mouth daily      cyanocobalamin 1,000 mcg/mL INJECT 1 ML ONCE A WEEK X 4 TIMES THEN EVERY 2 WEEKS X 4 TIMES THEN USE 1 ML EVERY 3-4 WEEKS 10 mL 3    cycloSPORINE, PF, (Cequa) 0 09 % SOLN       diclofenac sodium (VOLTAREN) 1 % USE 2 GRAMS 4 TIMES DAILY AS NEEDED FOR UPPER BODY JOINTS AND 4 GRAMS FOR LOWER BODY JOINTS 500 g 1    ergocalciferol (VITAMIN D2) 50,000 units 1 cap every 2 weeks 6 capsule 2    escitalopram (LEXAPRO) 20 mg tablet Take 1 tablet by mouth daily      gabapentin (NEURONTIN) 100 mg capsule Take 300 mg by mouth 2 (two) times a day as needed        levalbuterol (Xopenex HFA) 45 mcg/act inhaler Inhale 2 puffs every 6 (six) hours as needed for wheezing or shortness of breath (cough) 1 Inhaler 3    levothyroxine 100 mcg tablet Take 1 tablet (100 mcg total) by mouth daily 90 tablet 2    meloxicam (MOBIC) 15 mg tablet TAKE 1 TABLET ONCE A DAY AFTER FOOD AS NEEDED FOR JOINT PAIN 90 tablet 1    metoprolol tartrate (LOPRESSOR) 50 mg tablet TAKE 1 TABLET BY MOUTH EVERY DAY 90 tablet 3    mometasone (NASONEX) 50 mcg/act nasal spray SPRAY 2 SPRAYS INTO EACH NOSTRIL EVERY DAY 51 g 3    pantoprazole (PROTONIX) 40 mg tablet TAKE 1 TABLET (40 MG TOTAL) BY MOUTH DAILY BEFORE BREAKFAST TAKE 30 MINUTES BEFORE 90 tablet 3    SYMBICORT 160-4 5 MCG/ACT inhaler TAKE 2 PUFFS BY MOUTH TWICE A DAY 3 Inhaler 2    SYRINGE-NEEDLE, DISP, 3 ML (B-D SYRINGE/NEEDLE 3CC/23GX1") 23G X 1" 3 ML MISC Use  For vitamin B12 injections 10 each 5    triamcinolone (KENALOG) 0 5 % ointment Apply 1 application topically 2 (two) times a day To affected area 30 g 0    Vyvanse 50 MG capsule Take 50 mg by mouth 2 (two) times a day      Fe-Succ Ac-C-Thre Ac-B12-FA (FERREX 150 FORTE PLUS)  MG CAPS Take by mouth (Patient not taking: No sig reported)      valACYclovir (VALTREX) 1,000 mg tablet Take by mouth (Patient not taking: No sig reported)       No current facility-administered medications for this visit         Medications Discontinued During This Encounter   Medication Reason    lisdexamfetamine (VYVANSE) 40 MG capsule Duplicate order    famotidine (PEPCID) 40 MG tablet Therapy completed    methylPREDNISolone 4 MG tablet therapy pack Therapy completed    valACYclovir (VALTREX) 1,000 mg tablet Therapy completed    montelukast (SINGULAIR) 10 mg tablet Therapy completed       Health Maintenance     Health Maintenance   Topic Date Due    Hepatitis C Screening  Never done    HIV Screening  Never done    DTaP,Tdap,and Td Vaccines (1 - Tdap) Never done    Colorectal Cancer Screening  Never done    Osteoporosis Screening  Never done    Breast Cancer Screening: Mammogram  02/22/2019    Cervical Cancer Screening  02/13/2022    COVID-19 Vaccine (4 - Booster for Pfizer series) 03/20/2022    Influenza Vaccine (1) 09/01/2022    BMI: Followup Plan  12/10/2022    BMI: Adult  08/19/2023    Annual Physical  08/19/2023    Pneumococcal Vaccine: Pediatrics (0 to 5 Years) and At-Risk Patients (6 to 59 Years)  Aged Out    HIB Vaccine  Aged Out    Hepatitis B Vaccine  Aged Out    IPV Vaccine  Aged Out    Hepatitis A Vaccine  Aged Out    Meningococcal ACWY Vaccine  Aged Out    HPV Vaccine  Aged [employer] History   Administered Date(s) Administered    COVID-19 PFIZER VACCINE 0 3 ML IM 01/17/2021, 02/05/2021, 11/20/2021    INFLUENZA 03/12/2019    Influenza Quadrivalent Preservative Free 3 years and older IM 11/28/2016    Influenza, seasonal, injectable 10/14/2009, 09/16/2011, 01/10/2013    Pneumococcal Polysaccharide PPV23 02/01/2013    Tuberculin Skin Test-PPD Intradermal 12/13/2011       Zaynab Peng MD

## 2022-08-24 PROBLEM — Z00.00 ENCOUNTER FOR WELLNESS EXAMINATION IN ADULT: Status: ACTIVE | Noted: 2022-08-24

## 2022-08-24 PROBLEM — N63.20 MASSES OF BOTH BREASTS: Status: RESOLVED | Noted: 2017-02-13 | Resolved: 2022-08-24

## 2022-08-24 PROBLEM — Z00.00 HEALTH CARE MAINTENANCE: Status: RESOLVED | Noted: 2020-04-24 | Resolved: 2022-08-24

## 2022-08-24 PROBLEM — N63.10 MASSES OF BOTH BREASTS: Status: RESOLVED | Noted: 2017-02-13 | Resolved: 2022-08-24

## 2022-08-24 NOTE — ASSESSMENT & PLAN NOTE
Patient remains on vitamin D2 supplements, 47166 units every 2 weeks as per endocrinology  No hypercalcemia  PTH has been on the rise  Patient is concerned about ongoing elevated PTH level and is requesting to pursue further workup    She will proceed with parathyroid scan

## 2022-08-24 NOTE — ASSESSMENT & PLAN NOTE
Continue levothyroxine 100 mcg daily, recent TSH is normal   Proceed with blood work  Previous thyroid ultrasound revealed heterogenous atrophic thyroid gland likely due to autoimmune thyroiditis (July 2017)

## 2022-08-24 NOTE — ASSESSMENT & PLAN NOTE
Patient reports that overall generalized myalgias and arthralgias have been well controlled  She remains on meloxicam p r n  Referral to [facility]ology at her consideration

## 2022-09-12 ENCOUNTER — HOSPITAL ENCOUNTER (OUTPATIENT)
Dept: MAMMOGRAPHY | Facility: IMAGING CENTER | Age: 57
Discharge: HOME | End: 2022-09-12
Payer: COMMERCIAL

## 2022-09-12 ENCOUNTER — APPOINTMENT (OUTPATIENT)
Dept: LAB | Facility: HOSPITAL | Age: 57
End: 2022-09-12
Attending: INTERNAL MEDICINE
Payer: COMMERCIAL

## 2022-09-12 VITALS — WEIGHT: 180 LBS | BODY MASS INDEX: 30.73 KG/M2 | HEIGHT: 64 IN

## 2022-09-12 DIAGNOSIS — Z12.31 ENCOUNTER FOR SCREENING MAMMOGRAM FOR BREAST CANCER: ICD-10-CM

## 2022-09-12 DIAGNOSIS — E21.3 HYPERPARATHYROIDISM (HCC): ICD-10-CM

## 2022-09-12 DIAGNOSIS — E03.9 HYPOTHYROIDISM, UNSPECIFIED TYPE: ICD-10-CM

## 2022-09-12 DIAGNOSIS — Z90.3 POSTGASTRECTOMY MALABSORPTION: ICD-10-CM

## 2022-09-12 DIAGNOSIS — E55.9 VITAMIN D DEFICIENCY: ICD-10-CM

## 2022-09-12 DIAGNOSIS — K91.2 POSTGASTRECTOMY MALABSORPTION: ICD-10-CM

## 2022-09-12 LAB
25(OH)D3 SERPL-MCNC: 43.4 NG/ML (ref 30–100)
ALBUMIN SERPL BCP-MCNC: 3.7 G/DL (ref 3.5–5)
ALP SERPL-CCNC: 100 U/L (ref 46–116)
ALT SERPL W P-5'-P-CCNC: 30 U/L (ref 12–78)
ANION GAP SERPL CALCULATED.3IONS-SCNC: 4 MMOL/L (ref 4–13)
AST SERPL W P-5'-P-CCNC: 21 U/L (ref 5–45)
BASOPHILS # BLD AUTO: 0.06 THOUSANDS/ΜL (ref 0–0.1)
BASOPHILS NFR BLD AUTO: 1 % (ref 0–1)
BILIRUB SERPL-MCNC: 0.37 MG/DL (ref 0.2–1)
BUN SERPL-MCNC: 22 MG/DL (ref 5–25)
CA-I BLD-SCNC: 1.18 MMOL/L (ref 1.12–1.32)
CALCIUM SERPL-MCNC: 9.2 MG/DL (ref 8.3–10.1)
CHLORIDE SERPL-SCNC: 106 MMOL/L (ref 96–108)
CHOLEST SERPL-MCNC: 219 MG/DL
CO2 SERPL-SCNC: 28 MMOL/L (ref 21–32)
CREAT SERPL-MCNC: 0.96 MG/DL (ref 0.6–1.3)
EOSINOPHIL # BLD AUTO: 0.25 THOUSAND/ΜL (ref 0–0.61)
EOSINOPHIL NFR BLD AUTO: 3 % (ref 0–6)
ERYTHROCYTE [DISTWIDTH] IN BLOOD BY AUTOMATED COUNT: 12.4 % (ref 11.6–15.1)
EST. AVERAGE GLUCOSE BLD GHB EST-MCNC: 128 MG/DL
FERRITIN SERPL-MCNC: 7 NG/ML (ref 8–388)
GFR SERPL CREATININE-BSD FRML MDRD: 66 ML/MIN/1.73SQ M
GLUCOSE P FAST SERPL-MCNC: 111 MG/DL (ref 65–99)
HBA1C MFR BLD: 6.1 %
HCT VFR BLD AUTO: 37.6 % (ref 34.8–46.1)
HDLC SERPL-MCNC: 71 MG/DL
HGB BLD-MCNC: 11.5 G/DL (ref 11.5–15.4)
IMM GRANULOCYTES # BLD AUTO: 0.03 THOUSAND/UL (ref 0–0.2)
IMM GRANULOCYTES NFR BLD AUTO: 0 % (ref 0–2)
IRON SATN MFR SERPL: 16 % (ref 15–50)
IRON SERPL-MCNC: 57 UG/DL (ref 50–170)
LDLC SERPL CALC-MCNC: 128 MG/DL (ref 0–100)
LYMPHOCYTES # BLD AUTO: 2.01 THOUSANDS/ΜL (ref 0.6–4.47)
LYMPHOCYTES NFR BLD AUTO: 21 % (ref 14–44)
MCH RBC QN AUTO: 29 PG (ref 26.8–34.3)
MCHC RBC AUTO-ENTMCNC: 30.6 G/DL (ref 31.4–37.4)
MCV RBC AUTO: 95 FL (ref 82–98)
MONOCYTES # BLD AUTO: 0.56 THOUSAND/ΜL (ref 0.17–1.22)
MONOCYTES NFR BLD AUTO: 6 % (ref 4–12)
NEUTROPHILS # BLD AUTO: 6.49 THOUSANDS/ΜL (ref 1.85–7.62)
NEUTS SEG NFR BLD AUTO: 69 % (ref 43–75)
NRBC BLD AUTO-RTO: 0 /100 WBCS
PLATELET # BLD AUTO: 356 THOUSANDS/UL (ref 149–390)
PMV BLD AUTO: 9.8 FL (ref 8.9–12.7)
POTASSIUM SERPL-SCNC: 3.9 MMOL/L (ref 3.5–5.3)
PROT SERPL-MCNC: 7.5 G/DL (ref 6.4–8.4)
PTH-INTACT SERPL-MCNC: 115.5 PG/ML (ref 18.4–80.1)
RBC # BLD AUTO: 3.97 MILLION/UL (ref 3.81–5.12)
SODIUM SERPL-SCNC: 138 MMOL/L (ref 135–147)
T4 FREE SERPL-MCNC: 1.24 NG/DL (ref 0.76–1.46)
TIBC SERPL-MCNC: 350 UG/DL (ref 250–450)
TRIGL SERPL-MCNC: 101 MG/DL
TSH SERPL DL<=0.05 MIU/L-ACNC: 1.13 UIU/ML (ref 0.45–4.5)
VIT B12 SERPL-MCNC: 347 PG/ML (ref 100–900)
WBC # BLD AUTO: 9.4 THOUSAND/UL (ref 4.31–10.16)

## 2022-09-12 PROCEDURE — 83550 IRON BINDING TEST: CPT

## 2022-09-12 PROCEDURE — 84439 ASSAY OF FREE THYROXINE: CPT

## 2022-09-12 PROCEDURE — 83970 ASSAY OF PARATHORMONE: CPT

## 2022-09-12 PROCEDURE — 85025 COMPLETE CBC W/AUTO DIFF WBC: CPT

## 2022-09-12 PROCEDURE — 82607 VITAMIN B-12: CPT

## 2022-09-12 PROCEDURE — 80061 LIPID PANEL: CPT

## 2022-09-12 PROCEDURE — 83036 HEMOGLOBIN GLYCOSYLATED A1C: CPT

## 2022-09-12 PROCEDURE — 77063 BREAST TOMOSYNTHESIS BI: CPT

## 2022-09-12 PROCEDURE — 83540 ASSAY OF IRON: CPT

## 2022-09-12 PROCEDURE — 36415 COLL VENOUS BLD VENIPUNCTURE: CPT

## 2022-09-12 PROCEDURE — 82306 VITAMIN D 25 HYDROXY: CPT

## 2022-09-12 PROCEDURE — 84443 ASSAY THYROID STIM HORMONE: CPT

## 2022-09-12 PROCEDURE — 80053 COMPREHEN METABOLIC PANEL: CPT

## 2022-09-12 PROCEDURE — 82330 ASSAY OF CALCIUM: CPT

## 2022-09-12 PROCEDURE — 82728 ASSAY OF FERRITIN: CPT

## 2022-09-12 PROCEDURE — 77067 SCR MAMMO BI INCL CAD: CPT

## 2022-09-13 ENCOUNTER — TELEPHONE (OUTPATIENT)
Dept: FAMILY MEDICINE CLINIC | Facility: CLINIC | Age: 57
End: 2022-09-13

## 2022-09-13 LAB — COLOGUARD RESULT REPORTABLE: NEGATIVE

## 2022-09-13 NOTE — TELEPHONE ENCOUNTER
----- Message from Maria Esther Holden DO sent at 9/13/2022  9:02 AM EDT -----  Negative Cologuard result, will plan for repeat in 3 years       ----- Message -----  From: Miley Ventura  Sent: 9/13/2022   8:40 AM EDT  To: Carmen Ta MD

## 2022-09-14 DIAGNOSIS — L40.50 PSORIASIS WITH ARTHROPATHY (HCC): ICD-10-CM

## 2022-09-14 RX ORDER — MELOXICAM 15 MG/1
TABLET ORAL
Qty: 90 TABLET | Refills: 1 | Status: SHIPPED | OUTPATIENT
Start: 2022-09-14

## 2022-10-10 ENCOUNTER — OFFICE VISIT (OUTPATIENT)
Dept: OBGYN CLINIC | Facility: CLINIC | Age: 57
End: 2022-10-10
Payer: COMMERCIAL

## 2022-10-10 VITALS
HEIGHT: 64 IN | SYSTOLIC BLOOD PRESSURE: 124 MMHG | WEIGHT: 180 LBS | BODY MASS INDEX: 30.73 KG/M2 | DIASTOLIC BLOOD PRESSURE: 82 MMHG

## 2022-10-10 DIAGNOSIS — Z12.4 ENCOUNTER FOR SCREENING FOR MALIGNANT NEOPLASM OF CERVIX: ICD-10-CM

## 2022-10-10 DIAGNOSIS — D21.9 FIBROIDS: ICD-10-CM

## 2022-10-10 DIAGNOSIS — Z01.419 WELL WOMAN EXAM WITH ROUTINE GYNECOLOGICAL EXAM: Primary | ICD-10-CM

## 2022-10-10 DIAGNOSIS — Z11.51 SCREENING FOR HPV (HUMAN PAPILLOMAVIRUS): ICD-10-CM

## 2022-10-10 DIAGNOSIS — Z00.00 ENCOUNTER FOR WELLNESS EXAMINATION IN ADULT: ICD-10-CM

## 2022-10-10 DIAGNOSIS — Z12.31 ENCOUNTER FOR SCREENING MAMMOGRAM FOR MALIGNANT NEOPLASM OF BREAST: ICD-10-CM

## 2022-10-10 PROCEDURE — G0476 HPV COMBO ASSAY CA SCREEN: HCPCS | Performed by: OBSTETRICS & GYNECOLOGY

## 2022-10-10 PROCEDURE — G0145 SCR C/V CYTO,THINLAYER,RESCR: HCPCS | Performed by: OBSTETRICS & GYNECOLOGY

## 2022-10-10 PROCEDURE — S0610 ANNUAL GYNECOLOGICAL EXAMINA: HCPCS | Performed by: OBSTETRICS & GYNECOLOGY

## 2022-10-10 NOTE — PROGRESS NOTES
ASSESSMENT & PLAN:   Diagnoses and all orders for this visit:    Well woman exam with routine gynecological exam  -     Liquid-based pap, screening    Encounter for screening for malignant neoplasm of cervix  -     Liquid-based pap, screening    Screening for HPV (human papillomavirus)  -     Liquid-based pap, screening    Encounter for wellness examination in adult  -     Ambulatory referral to Obstetrics / Gynecology    Encounter for screening mammogram for malignant neoplasm of breast  -     Mammo screening bilateral w 3d & cad; Future    Fibroids  -     US pelvis complete w transvaginal; Future  -     H/o fibroids, was 10cm during pregnancy  The following were reviewed in today's visit: ASCCP guidelines, Gardisil vaccination, STD testing breast self exam, mammography screening ordered, menopause, exercise and healthy diet  Patient to return to office in yearly for annual exam      All questions have been answered to her satisfaction  CC:  Annual Gynecologic Examination  Chief Complaint   Patient presents with   • New Patient Visit     NP here today for her annual exam pap w/hpv 2017 neg/neg  3d mammogram 22 wnl  cologuard 22 neg       HPI: Lamar Maciel is a 65 y o  [id number] who presents for annual gynecologic examination  She has the following concerns:  None      Health Maintenance:    Exercise: none  Breast exams/breast awareness: yes  Diet: well balanced diet  Last mammogram: 2022  Colorectal cancer screenin2022 Cologuard      Past Medical History:   Diagnosis Date   • Anxiety     Disorder, per Allscripts   • Arthritis    • Disease of thyroid gland    • Hypoparathyroidism ([address] )    • Osteoarthritis    • Psoriatic arthritis ([address] )        Past Surgical History:   Procedure Laterality Date   • BUNIONECTOMY     •  SECTION      2 X's   • GASTRIC BYPASS      For Morbid Obesity, per Allscripts   • HIP SURGERY     • MOUTH SURGERY     • SINUS SURGERY         Past OB/Gyn History:   No LMP recorded (lmp unknown)  Patient is postmenopausal     Menopausal status: postmenopausal  Menopausal symptoms: occasional hot flash    Last Pap: 2017 : no abnormalities  History of abnormal Pap smear: not sure    Patient is currently sexually active       Family History  Family History   Problem Relation Age of Onset   • Dementia Mother    • Osteopenia Mother    • Leukemia Father    • Diabetes unspecified Father    • Hypothyroidism Sister    • Breast cancer Maternal Aunt        Family history of uterine or ovarian cancer: no  Family history of breast cancer: yes  Family history of colon cancer: no    Social History:  Social History     Socioeconomic History   • Marital status: /Civil Union     Spouse name: Not on file   • Number of children: 2   • Years of education: Not on file   • Highest education level: Not on file   Occupational History   • Occupation: currently works full-time   Tobacco Use   • Smoking status: Former Smoker     Packs/day: 0 50     Years: 5 00     Pack years: 2 50     Types: Cigarettes     Start date: 1993     Quit date: 2000     Years since quittin 3   • Smokeless tobacco: Former User   • Tobacco comment: Never a smoker, per Allscripts   Vaping Use   • Vaping Use: Never used   Substance and Sexual Activity   • Alcohol use: No   • Drug use: No   • Sexual activity: Not Currently     Partners: Male     Birth control/protection: Post-menopausal   Other Topics Concern   • Not on file   Social History Narrative        2 children    Working currently    Always uses seat belt    College student    Exercise frequency     Feels safe at home     Social Determinants of Health     Financial Resource Strain: Not on file   Food Insecurity: Not on file   Transportation Needs: Not on file   Physical Activity: Not on file   Stress: Not on file   Social Connections: Not on file   Intimate Partner Violence: Not on file   Housing Stability: Not on file Domestic violence screen: negative    Allergies:   Allergies   Allergen Reactions   • Cyclosporine Hives, Itching and Rash   • Cephalexin Rash     Reaction Date: 28Jul2011;  Cephalosporin per patient    • Moxifloxacin Rash     Reaction Date: 18Apr2011;    • Sulfamethoxazole-Trimethoprim Rash       Medications:    Current Outpatient Medications:   •  amphetamine-dextroamphetamine (ADDERALL) 15 MG tablet, Take 1 tablet by mouth daily as needed, Disp: , Rfl:   •  buPROPion (WELLBUTRIN XL) 300 mg 24 hr tablet, Take 1 tablet by mouth daily, Disp: , Rfl:   •  cyanocobalamin 1,000 mcg/mL, INJECT 1 ML ONCE A WEEK X 4 TIMES THEN EVERY 2 WEEKS X 4 TIMES THEN USE 1 ML EVERY 3-4 WEEKS, Disp: 10 mL, Rfl: 3  •  diclofenac sodium (VOLTAREN) 1 %, USE 2 GRAMS 4 TIMES DAILY AS NEEDED FOR UPPER BODY JOINTS AND 4 GRAMS FOR LOWER BODY JOINTS, Disp: 500 g, Rfl: 1  •  ergocalciferol (VITAMIN D2) 50,000 units, 1 cap every 2 weeks, Disp: 6 capsule, Rfl: 2  •  escitalopram (LEXAPRO) 20 mg tablet, Take 1 tablet by mouth daily, Disp: , Rfl:   •  gabapentin (NEURONTIN) 100 mg capsule, Take 300 mg by mouth 2 (two) times a day as needed  , Disp: , Rfl:   •  levalbuterol (Xopenex HFA) 45 mcg/act inhaler, Inhale 2 puffs every 6 (six) hours as needed for wheezing or shortness of breath (cough), Disp: 1 Inhaler, Rfl: 3  •  levothyroxine 100 mcg tablet, Take 1 tablet (100 mcg total) by mouth daily, Disp: 90 tablet, Rfl: 2  •  meloxicam (MOBIC) 15 mg tablet, TAKE 1 TABLET ONCE A DAY AFTER FOOD AS NEEDED FOR JOINT PAIN, Disp: 90 tablet, Rfl: 1  •  metoprolol tartrate (LOPRESSOR) 50 mg tablet, TAKE 1 TABLET BY MOUTH EVERY DAY, Disp: 90 tablet, Rfl: 3  •  mometasone (NASONEX) 50 mcg/act nasal spray, SPRAY 2 SPRAYS INTO EACH NOSTRIL EVERY DAY, Disp: 51 g, Rfl: 3  •  pantoprazole (PROTONIX) 40 mg tablet, TAKE 1 TABLET (40 MG TOTAL) BY MOUTH DAILY BEFORE BREAKFAST TAKE 30 MINUTES BEFORE, Disp: 90 tablet, Rfl: 3  •  SYMBICORT 160-4 5 MCG/ACT inhaler, TAKE 2 PUFFS BY MOUTH TWICE A DAY, Disp: 3 Inhaler, Rfl: 2  •  SYRINGE-NEEDLE, DISP, 3 ML (B-D SYRINGE/NEEDLE 3CC/23GX1") 23G X 1" 3 ML MISC, Use  For vitamin B12 injections, Disp: 10 each, Rfl: 5  •  triamcinolone (KENALOG) 0 5 % ointment, Apply 1 application topically 2 (two) times a day To affected area, Disp: 30 g, Rfl: 0  •  Vyvanse 50 MG capsule, Take 50 mg by mouth 2 (two) times a day, Disp: , Rfl:   •  cycloSPORINE, PF, (Cequa) 0 09 % SOLN, , Disp: , Rfl:   •  Fe-Succ Ac-C-Thre Ac-B12-FA (FERREX 150 FORTE PLUS)  MG CAPS, Take by mouth (Patient not taking: No sig reported), Disp: , Rfl:   •  valACYclovir (VALTREX) 1,000 mg tablet, Take by mouth (Patient not taking: No sig reported), Disp: , Rfl:     Review of Systems:  Review of Systems   Constitutional: Negative for chills and fever  Respiratory: Positive for cough (cold last week )  Negative for shortness of breath  Cardiovascular: Negative for chest pain and palpitations  Gastrointestinal: Positive for abdominal distention  Negative for abdominal pain, blood in stool, constipation, nausea and vomiting  Genitourinary: Negative for difficulty urinating, dysuria, frequency, pelvic pain, urgency, vaginal bleeding, vaginal discharge and vaginal pain  Neurological: Negative for headaches  Physical Exam:  /82 (BP Location: Right arm, Patient Position: Sitting, Cuff Size: Standard)   Ht 5' 4" (1 626 m)   Wt 81 6 kg (180 lb)   LMP  (LMP Unknown)   Breastfeeding No   BMI 30 90 kg/m²    Physical Exam  Constitutional:       General: She is awake  Appearance: Normal appearance  She is well-developed  Genitourinary:      Vulva, bladder and urethral meatus normal       Right Labia: No rash, tenderness or lesions  Left Labia: No tenderness, lesions or rash  No labial fusion noted  No vaginal discharge, erythema, tenderness or bleeding  No vaginal prolapse present  No vaginal atrophy present         Right Adnexa: not tender, not full and no mass present  Left Adnexa: not tender, not full and no mass present  No cervical motion tenderness, discharge, lesion or polyp  Uterus is fixed  Uterus is not enlarged, tender or irregular  No uterine mass detected  Uterus exam comments: No fibroids appreciated on exam    Uterus is retroverted  No urethral prolapse present  Bladder is not tender  Pelvic exam was performed with patient in the lithotomy position  Breasts:      Right: No inverted nipple, mass, nipple discharge, skin change, tenderness, axillary adenopathy or supraclavicular adenopathy  Left: No inverted nipple, mass, nipple discharge, skin change, tenderness, axillary adenopathy or supraclavicular adenopathy  HENT:      Head: Normocephalic and atraumatic  Cardiovascular:      Rate and Rhythm: Normal rate and regular rhythm  Heart sounds: Normal heart sounds  Pulmonary:      Effort: Pulmonary effort is normal  No tachypnea or respiratory distress  Breath sounds: Normal breath sounds  Chest:       Abdominal:      General: Abdomen is flat  There is no distension  Palpations: Abdomen is soft  Tenderness: There is no abdominal tenderness  There is no guarding or rebound  Musculoskeletal:      Cervical back: Neck supple  Lymphadenopathy:      Upper Body:      Right upper body: No supraclavicular or axillary adenopathy  Left upper body: No supraclavicular or axillary adenopathy  Neurological:      General: No focal deficit present  Mental Status: She is alert and oriented to person, place, and time  Psychiatric:         Mood and Affect: Mood normal          Behavior: Behavior normal          Thought Content: Thought content normal          Judgment: Judgment normal    Vitals reviewed

## 2022-10-11 LAB
HPV HR 12 DNA CVX QL NAA+PROBE: NEGATIVE
HPV16 DNA CVX QL NAA+PROBE: NEGATIVE
HPV18 DNA CVX QL NAA+PROBE: NEGATIVE

## 2022-10-11 NOTE — RESULT ENCOUNTER NOTE
Marcelino Newman,     Your HPV testing is negative  Your pap is still pending, and will be updated soon  Please contact the office at [phone] with any questions       Iliana

## 2022-10-17 LAB
LAB AP GYN PRIMARY INTERPRETATION: NORMAL
Lab: NORMAL

## 2023-02-22 NOTE — TELEPHONE ENCOUNTER
Podiatric Surgery - Clinic Note  April Lane : 1944 Sex: female MRN: [id number] 2023 11:11 AM    ASSESSMENT:  1. Hammer toes, bilateral    2. Hallux valgus, bilateral    3. Bilateral foot pain          PLAN:  Discussed care and treatment with pt.  All questions were answered.  X-rays taken and reviewed with patient.  Reviewed conservative vs surgical treatment options.  Patient was given silicone toe covers to reduce the pressure on the dorsal aspect of the PIPJ of the 2nd digits when wearing shoes.  She will try these and consider surgery in the future.  She will contact my office if she would like to further pursue surgical intervention.    We discussed perioperative management of surgery and patient will need bunionectomy surgery as well as 2nd metatarsal osteotomy and arthroplasty of the PIPJ with K-wire fixation.  Patient will be in a walking boot up to 8 weeks and if this is originally performed on her right foot she will be unable to drive during that time.  Patient did not necessarily like the sound of these postop restrictions and therefore significantly think about this procedure and contact my office if she would like to further pursue.    CHIEF COMPLAINT:  Chief Complaint   Patient presents with   • Office Visit     Butch bunions        SUBJECTIVE:  April Lane is a 79 year old female presents to the podiatric surgery clinic with bunion deformity that has gotten worse over the last 4 years.  She now notes that the 2nd digit overlaps the great toe and mainly cause pain with rubbing on shoe gear and points to the dorsal aspect of the PIPJ bilaterally.  Patient does not have pain to the medial eminence of the bunion deformity.  She notes her mother and grandmother both had this deformity.  She is here today to further discuss surgical intervention as the deformity has only gotten worse.  She does have pain on a regular basis to the 2nd toe and is curious about surgery and the perioperative  Rx for prednisone sent to [facility] pharmacy, will provide patient with slightly longer taper    I advised her to contact me if symptoms are still recurring, she will need follow-up appointment then management of this deformity.  Patient has tried wider and softer shoe gear and reports the shoes fit but after wearing for a few hours her toe will then become painful.  Denies blistering or open sores on the 2nd toe.    Medications, Allergies, Past Medical History, Past Surgical History, Social History, and Family History reviewed in the electronic medical record.    PMH:   Past Medical History:   Diagnosis Date   • Anxiety    • Bell's palsy    • Disorder of bone and cartilage, unspecified 03/30/2011   • Diverticular disease 05/25/2010   • Eczema    • Other and unspecified hyperlipidemia    • Paresthesias    • Unspecified essential hypertension    • Vitamin D deficiency        Medications:   Current Outpatient Medications   Medication Sig Dispense Refill   • meloxicam (MOBIC) 15 MG tablet TAKE 1 TABLET BY MOUTH DAILY 90 tablet 0   • losartan (COZAAR) 50 MG tablet Take 1 tablet by mouth daily. 90 tablet 3   • hydrochlorothiazide (HYDRODIURIL) 50 MG tablet Take 1 tablet by mouth daily. 90 tablet 3   • potassium CHLORIDE (KLOR-CON M) 20 MEQ anastasiya ER tablet Take 1 tablet by mouth daily. 90 tablet 3   • ALPRAZolam (XANAX) 0.25 MG tablet 1/2 to 1 tab daily as needed 30 tablet 0     No current facility-administered medications for this visit.       Family Hx:   Family History   Problem Relation Age of Onset   • Hypertension Father    • Peripheral Vascular Disease Father         Carotid vascular disease       Social Hx:   Social History     Socioeconomic History   • Marital status: /Civil Union     Spouse name: Not on file   • Number of children: Not on file   • Years of education: Not on file   • Highest education level: Not on file   Occupational History   • Occupation: retired   Tobacco Use   • Smoking status: Never   • Smokeless tobacco: Never   Substance and Sexual Activity   • Alcohol use: No     Alcohol/week: 0.0 standard drinks   • Drug use: No   • Sexual activity: Not on file   Other Topics Concern   •   Service No   • Blood Transfusions No   • Caffeine Concern No   • Occupational Exposure No   • Hobby Hazards No   • Sleep Concern No   • Stress Concern No   • Weight Concern No   • Special Diet Yes     Comment: Follows a low-fat diet   • Back Care No   • Exercise Yes     Comment: Walks for exercise   • Bike Helmet Not Asked   • Seat Belt Yes   • Self-Exams Yes   Social History Narrative    Lives with .    Real estate    And      Social Determinants of Health     Financial Resource Strain: Not on file   Food Insecurity: Not on file   Transportation Needs: Not on file   Physical Activity: Not on file   Stress: Not on file   Social Connections: Not on file   Intimate Partner Violence: Not on file       Surgical Hx:  Past Surgical History:   Procedure Laterality Date   • Breast biopsy Left     left-benign cyst college aged   • Colonoscopy diagnostic  05/25/2010    Dr. [facility] ( [facility])- diverticular disease   • Dexa bone density axial skeleton  03/30/2011   • Occult blood test tube  03/25/2011   • Pap smear,routine  03/23/2011       ROS: All ROS negative except as above in the HPI.     PHYSICAL EXAMINATIONS:  Vital Signs:  There were no vitals taken for this visit.  There is no height or weight on file to calculate BMI.    General: No apparent distress, alert and oriented.    Vascular: Pulses: PT 2/4and DP 2/4 bilateral  Hair growth present at the level of the digits.  Capillary filling time less than three seconds bilateral digits 1-5.  Lower extremity/foot edema absent bilateral    Neurological: Protective sensation intact to light touch bilateral to light touch.  Strength and tone bilateral  lower extremity muscles rated at 5/5 in all quadrants in lower leg and foot.    Dermatologic: Skin warm, dry and supple, without open lesions bilateral  foot.  Hyperemia noted medial 1st metatarsal head bilateral foot.      Musculoskeletal:  Abducted hallux abutting 2nd digit with prominent 1st  met head medially bilateral foot.  Second digit overlaps the great digit with dorsal contracture present.  I am able to manually reduce this deformity.  Patient does not have pain to the medial eminence but does have pain to the dorsal PIPJ 2nd digits bilaterally.      Primary care physician:   MD Ayala Palmer DPM  [facility]  PGR: [phone]

## 2023-03-07 ENCOUNTER — OFFICE VISIT (OUTPATIENT)
Dept: RHEUMATOLOGY | Facility: CLINIC | Age: 58
End: 2023-03-07

## 2023-03-07 VITALS
WEIGHT: 180.9 LBS | DIASTOLIC BLOOD PRESSURE: 86 MMHG | SYSTOLIC BLOOD PRESSURE: 160 MMHG | HEIGHT: 64 IN | BODY MASS INDEX: 30.88 KG/M2

## 2023-03-07 DIAGNOSIS — R68.2 DRY MOUTH: ICD-10-CM

## 2023-03-07 DIAGNOSIS — M35.05 SJOGREN SYNDROME WITH INFLAMMATORY ARTHRITIS (HCC): Primary | ICD-10-CM

## 2023-03-07 DIAGNOSIS — H04.129 DRY EYE: ICD-10-CM

## 2023-03-07 DIAGNOSIS — L40.50 PSORIASIS WITH ARTHROPATHY (HCC): ICD-10-CM

## 2023-03-07 RX ORDER — PILOCARPINE HYDROCHLORIDE 5 MG/1
5 TABLET, FILM COATED ORAL 3 TIMES DAILY
Qty: 90 TABLET | Refills: 3 | Status: SHIPPED | OUTPATIENT
Start: 2023-03-07

## 2023-03-07 NOTE — PATIENT INSTRUCTIONS
Take the pilocarpine as directed for your dry mouth  Use over the counter Voltaren gel and apply it to your painful joints up to 4 times per day  Please have your blood work and xrays done today

## 2023-03-07 NOTE — LETTER
March 9, 2023     Lynda Sherman MD  [address] [facility] [address]    Patient: Emir Eli   YOB: 1965   Date of Visit: 3/7/2023       Dear Dr Singh Garcia: Thank you for referring Emir Eli to me for evaluation  Below are my notes for this consultation  If you have questions, please do not hesitate to call me  I look forward to following your patient along with you  Sincerely,        Meg Mi MD        CC: No Recipients  Meg Mi MD  3/7/2023  2:37 PM  Signed  Rheumatology Consult   Emir Eli 63 y o  female 1965    DATE: 3/7/2023    Reason for Consult: Sjogren's, psoriatic arthritis    Assessment and Plan:  Sjogren's with arthritis--check esr/crp  Pilocarpine PRN dry mouth  F/u with ophthalmology for discussion of punctal plugs vs topical cyclosporine drops  Anti-inflammatory diet/exercise/weight control  Increase CV fitness/aerobic activity  Patient to continue to monitor symptoms  Topical NSAIDs/analgesics PRN joint pain  Repeat hand xrays  Xray SI joints given inflammatory spinal pain  Check HLA B27  Consider MTX given arthritis  F/u with dermatology given rash that is not c/w psoriasis  F/u in 6 mos  or sooner if necessary    History of Present Illness:  Emir Eli is a 63 y o  female Here for initial eval of dry eye/mouth and early Sjogren's antibody positive  She has polyarticular joint pain and stiffness  Was seen by Cecilia Gooden in the past        Review of Systems  Review of Systems   Constitutional: Positive for fatigue  HENT: Negative for mouth sores  Respiratory: Negative for cough and shortness of breath  Cardiovascular: Negative for chest pain and leg swelling  Gastrointestinal: Negative for abdominal pain, constipation and diarrhea  Musculoskeletal: Positive for arthralgias, back pain, neck pain and neck stiffness  Negative for joint swelling and myalgias     Skin: Negative for color change and rash  Neurological: Negative for weakness  Hematological: Negative for adenopathy  Psychiatric/Behavioral: Negative for sleep disturbance  Allergies  Allergies   Allergen Reactions   • Cyclosporine Hives, Itching and Rash   • Cephalexin Rash     Reaction Date: 2011;  Cephalosporin per patient    • Moxifloxacin Rash     Reaction Date: 2011;    • Sulfamethoxazole-Trimethoprim Rash       Current Medications      Past Medical History  Past Medical History:   Diagnosis Date   • Anxiety     Disorder, per Allscripts   • Arthritis    • Disease of thyroid gland    • [address] [address]    • Osteoarthritis    • Psoriatic arthritis ([facility] [address] )        Past Surgical History  Past Surgical History:   Procedure Laterality Date   • BUNIONECTOMY     •  SECTION      2 X's   • GASTRIC BYPASS      For Morbid Obesity, per Allscripts   • HIP SURGERY     • MOUTH SURGERY     • SINUS SURGERY         Family History  No known autoimmune or inflammatory diseases in the family  Family History   Problem Relation Age of Onset   • Dementia Mother    • Osteopenia Mother    • Leukemia Father    • Diabetes unspecified Father    • Hypothyroidism Sister    • Breast cancer Maternal Aunt        Social History  Occupation: psychologist  Social History     Substance and Sexual Activity   Alcohol Use No     Social History     Substance and Sexual Activity   Drug Use No     Social History     Tobacco Use   Smoking Status Former   • Packs/day: 0 50   • Years: 5 00   • Pack years: 2 50   • Types: Cigarettes   • Start date: 1993   • Quit date: 2000   • Years since quittin 7   Smokeless Tobacco Former   Tobacco Comments    Never a smoker, per Allscripts        Objective:  /86   Ht 5' 4" (1 626 m)   Wt 82 1 kg (180 lb 14 4 oz)   LMP  (LMP Unknown)   BMI 31 05 kg/m²     Physical Exam  Constitutional:       General: She is not in acute distress  HENT:      Head: Normocephalic and atraumatic  Mouth/Throat:      Mouth: Mucous membranes are dry  Eyes:      Conjunctiva/sclera: Conjunctivae normal    Cardiovascular:      Rate and Rhythm: Normal rate and regular rhythm  Heart sounds: S1 normal and S2 normal      No friction rub  Pulmonary:      Effort: Pulmonary effort is normal  No respiratory distress  Breath sounds: Normal breath sounds  No wheezing, rhonchi or rales  Musculoskeletal:      Right hand: Tenderness present  Left hand: Tenderness present  Cervical back: Neck supple  Skin:     General: Skin is warm and dry  Coloration: Skin is not pale  Findings: No rash  Neurological:      Mental Status: She is alert  Mental status is at baseline  Psychiatric:         Mood and Affect: Mood normal          Behavior: Behavior normal             Lab Results: I have personally reviewed pertinent reports        CBC:   , Chemistry Profile:       Invalid input(s): ALBUMIN, Coagulation Studies:   , Cardiac Studies:   , Additional Labs:   , iSTAT CHEM 8:       Invalid input(s): POTASSIUMIS, ABG:   , Toxicology:   , Last A1C/Lipid Panel/Thyroid Panel:   Lab Results   Component Value Date    HGBA1C 6 1 (H) 09/12/2022    HGBA1C 5 7 (H) 03/09/2020    TRIG 101 09/12/2022    TRIG 103 07/01/2020    CHOL 255 04/06/2015    CHOL 228 07/14/2014    HDL 71 09/12/2022    HDL 70 07/01/2020    LDLCALC 128 (H) 09/12/2022    LDLCALC 117 (H) 07/01/2020    [id number] 1 130 09/12/2022    FREET4 1 24 09/12/2022     Lab Results   Component Value Date    CRP <3 0 03/09/2020    CRP 11 5 (H) 11/16/2015    ARIK Negative 11/16/2015    RF Negative 11/16/2015           Invalid input(s): URIBILINOGEN         Imaging: I have personally reviewed pertinent films in PACS

## 2023-03-07 NOTE — PROGRESS NOTES
Rheumatology Consult   Alex Seo 63 y o  female 1965    DATE: 3/7/2023    Reason for Consult: Sjogren's, psoriatic arthritis    Assessment and Plan:  Sjogren's with arthritis--check esr/crp  Pilocarpine PRN dry mouth  F/u with ophthalmology for discussion of punctal plugs vs topical cyclosporine drops  Anti-inflammatory diet/exercise/weight control  Increase CV fitness/aerobic activity  Patient to continue to monitor symptoms  Topical NSAIDs/analgesics PRN joint pain  Repeat hand xrays  Xray SI joints given inflammatory spinal pain  Check HLA B27  Consider MTX given arthritis  F/u with dermatology given rash that is not c/w psoriasis  F/u in 6 mos  or sooner if necessary    History of Present Illness:  Alex Seo is a 63 y o  female Here for initial eval of dry eye/mouth and early Sjogren's antibody positive  She has polyarticular joint pain and stiffness  Was seen by Juan Jose Davila in the past        Review of Systems  Review of Systems   Constitutional: Positive for fatigue  HENT: Negative for mouth sores  Respiratory: Negative for cough and shortness of breath  Cardiovascular: Negative for chest pain and leg swelling  Gastrointestinal: Negative for abdominal pain, constipation and diarrhea  Musculoskeletal: Positive for arthralgias, back pain, neck pain and neck stiffness  Negative for joint swelling and myalgias  Skin: Negative for color change and rash  Neurological: Negative for weakness  Hematological: Negative for adenopathy  Psychiatric/Behavioral: Negative for sleep disturbance         Allergies  Allergies   Allergen Reactions   • Cyclosporine Hives, Itching and Rash   • Cephalexin Rash     Reaction Date: 28Jul2011;  Cephalosporin per patient    • Moxifloxacin Rash     Reaction Date: 18Apr2011;    • Sulfamethoxazole-Trimethoprim Rash       Current Medications      Past Medical History  Past Medical History:   Diagnosis Date   • Anxiety     Disorder, per [employer]   • Arthritis    • Disease of thyroid gland    • Hypoparathyroidism ([address] )    • Osteoarthritis    • Psoriatic arthritis ([facility] [address] )        Past Surgical History  Past Surgical History:   Procedure Laterality Date   • BUNIONECTOMY     •  SECTION      2 X's   • GASTRIC BYPASS      For Morbid Obesity, per Allscripts   • HIP SURGERY     • MOUTH SURGERY     • SINUS SURGERY         Family History  No known autoimmune or inflammatory diseases in the family  Family History   Problem Relation Age of Onset   • Dementia Mother    • Osteopenia Mother    • Leukemia Father    • Diabetes unspecified Father    • Hypothyroidism Sister    • Breast cancer Maternal Aunt        Social History  Occupation: psychologist  Social History     Substance and Sexual Activity   Alcohol Use No     Social History     Substance and Sexual Activity   Drug Use No     Social History     Tobacco Use   Smoking Status Former   • Packs/day: 0 50   • Years: 5 00   • Pack years: 2 50   • Types: Cigarettes   • Start date: 1993   • Quit date: 2000   • Years since quittin 7   Smokeless Tobacco Former   Tobacco Comments    Never a smoker, per Allscripts        Objective:  /86   Ht 5' 4" (1 626 m)   Wt 82 1 kg (180 lb 14 4 oz)   LMP  (LMP Unknown)   BMI 31 05 kg/m²     Physical Exam  Constitutional:       General: She is not in acute distress  HENT:      Head: Normocephalic and atraumatic  Mouth/Throat:      Mouth: Mucous membranes are dry  Eyes:      Conjunctiva/sclera: Conjunctivae normal    Cardiovascular:      Rate and Rhythm: Normal rate and regular rhythm  Heart sounds: S1 normal and S2 normal      No friction rub  Pulmonary:      Effort: Pulmonary effort is normal  No respiratory distress  Breath sounds: Normal breath sounds  No wheezing, rhonchi or rales  Musculoskeletal:      Right hand: Tenderness present  Left hand: Tenderness present  Cervical back: Neck supple     Skin: General: Skin is warm and dry  Coloration: Skin is not pale  Findings: No rash  Neurological:      Mental Status: She is alert  Mental status is at baseline  Psychiatric:         Mood and Affect: Mood normal          Behavior: Behavior normal             Lab Results: I have personally reviewed pertinent reports        CBC:   , Chemistry Profile:       Invalid input(s): ALBUMIN, Coagulation Studies:   , Cardiac Studies:   , Additional Labs:   , iSTAT CHEM 8:       Invalid input(s): POTASSIUMIS, ABG:   , Toxicology:   , Last A1C/Lipid Panel/Thyroid Panel:   Lab Results   Component Value Date    HGBA1C 6 1 (H) 09/12/2022    HGBA1C 5 7 (H) 03/09/2020    TRIG 101 09/12/2022    TRIG 103 07/01/2020    CHOL 255 04/06/2015    CHOL 228 07/14/2014    HDL 71 09/12/2022    HDL 70 07/01/2020    LDLCALC 128 (H) 09/12/2022    LDLCALC 117 (H) 07/01/2020    [id number] 1 130 09/12/2022    FREET4 1 24 09/12/2022     Lab Results   Component Value Date    CRP <3 0 03/09/2020    CRP 11 5 (H) 11/16/2015    ARIK Negative 11/16/2015    RF Negative 11/16/2015           Invalid input(s): URIBILINOGEN         Imaging: I have personally reviewed pertinent films in PACS

## 2023-03-14 ENCOUNTER — HOSPITAL ENCOUNTER (OUTPATIENT)
Dept: RADIOLOGY | Facility: HOSPITAL | Age: 58
Discharge: HOME | End: 2023-03-14
Attending: INTERNAL MEDICINE

## 2023-03-14 ENCOUNTER — APPOINTMENT (OUTPATIENT)
Dept: LAB | Facility: CLINIC | Age: 58
End: 2023-03-14

## 2023-03-14 DIAGNOSIS — L40.50 PSORIASIS WITH ARTHROPATHY (HCC): ICD-10-CM

## 2023-03-14 DIAGNOSIS — H04.129 DRY EYE: ICD-10-CM

## 2023-03-14 DIAGNOSIS — M35.05 SJOGREN SYNDROME WITH INFLAMMATORY ARTHRITIS (HCC): ICD-10-CM

## 2023-03-14 DIAGNOSIS — R68.2 DRY MOUTH: ICD-10-CM

## 2023-03-14 LAB
CRP SERPL QL: 5.2 MG/L
ERYTHROCYTE [SEDIMENTATION RATE] IN BLOOD: 50 MM/HOUR (ref 0–29)

## 2023-03-15 LAB
ENA SS-A AB SER-ACNC: <0.2 AI (ref 0–0.9)
ENA SS-B AB SER-ACNC: <0.2 AI (ref 0–0.9)
HBV SURFACE AG SER QL: NORMAL
HCV AB SER QL: NORMAL

## 2023-03-16 LAB
GAMMA INTERFERON BACKGROUND BLD IA-ACNC: 0.03 IU/ML
M TB IFN-G BLD-IMP: NEGATIVE
M TB IFN-G CD4+ BCKGRND COR BLD-ACNC: 0 IU/ML
M TB IFN-G CD4+ BCKGRND COR BLD-ACNC: 0 IU/ML
MITOGEN IGNF BCKGRD COR BLD-ACNC: >10 IU/ML

## 2023-03-19 DIAGNOSIS — E03.9 HYPOTHYROIDISM, UNSPECIFIED TYPE: ICD-10-CM

## 2023-03-20 LAB — HLA-B27 QL NAA+PROBE: NEGATIVE

## 2023-03-20 RX ORDER — LEVOTHYROXINE SODIUM 0.1 MG/1
TABLET ORAL
Qty: 90 TABLET | Refills: 2 | OUTPATIENT
Start: 2023-03-20

## 2023-03-20 NOTE — TELEPHONE ENCOUNTER
Patient has not been seen in the office since August 2022  Please call to schedule an appointment  Thank you! Can fill to get her to next appointment once scheduled

## 2023-03-28 ENCOUNTER — TELEPHONE (OUTPATIENT)
Dept: FAMILY MEDICINE CLINIC | Facility: CLINIC | Age: 58
End: 2023-03-28

## 2023-03-28 DIAGNOSIS — E03.9 HYPOTHYROIDISM, UNSPECIFIED TYPE: ICD-10-CM

## 2023-03-28 DIAGNOSIS — K91.2 POSTGASTRECTOMY MALABSORPTION: Primary | ICD-10-CM

## 2023-03-28 DIAGNOSIS — Z90.3 POSTGASTRECTOMY MALABSORPTION: Primary | ICD-10-CM

## 2023-03-28 RX ORDER — LEVOTHYROXINE SODIUM 0.1 MG/1
100 TABLET ORAL DAILY
Qty: 90 TABLET | Refills: 1 | Status: SHIPPED | OUTPATIENT
Start: 2023-03-28 | End: 2023-03-28 | Stop reason: SDUPTHER

## 2023-03-29 RX ORDER — LEVOTHYROXINE SODIUM 0.1 MG/1
100 TABLET ORAL DAILY
Qty: 90 TABLET | Refills: 0 | Status: SHIPPED | OUTPATIENT
Start: 2023-03-29

## 2023-03-29 NOTE — TELEPHONE ENCOUNTER
Please contact patient  I refilled levothyroxine, I also reviewed results of recent blood work which unfortunately did not include her TSH CMP, vitamin levels or complete blood count  I placed the orders for the blood work, urtication can proceed within next few weeks    Thank you

## 2023-04-07 DIAGNOSIS — K21.9 CHRONIC GERD: ICD-10-CM

## 2023-04-08 RX ORDER — PANTOPRAZOLE SODIUM 40 MG/1
TABLET, DELAYED RELEASE ORAL
Qty: 90 TABLET | Refills: 0 | Status: SHIPPED | OUTPATIENT
Start: 2023-04-08

## 2023-04-16 PROBLEM — E21.3 HYPERPARATHYROIDISM (HCC): Chronic | Status: ACTIVE | Noted: 2021-02-25

## 2023-04-30 ENCOUNTER — HOSPITAL ENCOUNTER (OUTPATIENT)
Dept: NUCLEAR MEDICINE | Facility: HOSPITAL | Age: 58
Discharge: HOME | End: 2023-04-30

## 2023-04-30 DIAGNOSIS — E21.3 HYPERPARATHYROIDISM (HCC): ICD-10-CM

## 2023-05-15 ENCOUNTER — OFFICE VISIT (OUTPATIENT)
Dept: FAMILY MEDICINE CLINIC | Facility: CLINIC | Age: 58
End: 2023-05-15

## 2023-05-15 VITALS
HEART RATE: 61 BPM | SYSTOLIC BLOOD PRESSURE: 123 MMHG | OXYGEN SATURATION: 100 % | WEIGHT: 179.8 LBS | RESPIRATION RATE: 16 BRPM | DIASTOLIC BLOOD PRESSURE: 58 MMHG | BODY MASS INDEX: 31.86 KG/M2 | HEIGHT: 63 IN | TEMPERATURE: 97.6 F

## 2023-05-15 DIAGNOSIS — E78.2 MIXED HYPERLIPIDEMIA: ICD-10-CM

## 2023-05-15 DIAGNOSIS — M21.611 BUNION, RIGHT FOOT: ICD-10-CM

## 2023-05-15 DIAGNOSIS — M35.00 SJOGREN'S SYNDROME, WITH UNSPECIFIED ORGAN INVOLVEMENT (HCC): ICD-10-CM

## 2023-05-15 DIAGNOSIS — E55.9 VITAMIN D DEFICIENCY: ICD-10-CM

## 2023-05-15 DIAGNOSIS — J01.91 ACUTE RECURRENT SINUSITIS, UNSPECIFIED LOCATION: ICD-10-CM

## 2023-05-15 DIAGNOSIS — E21.3 HYPERPARATHYROIDISM (HCC): Primary | Chronic | ICD-10-CM

## 2023-05-15 RX ORDER — FLUCONAZOLE 150 MG/1
150 TABLET ORAL ONCE
Qty: 1 TABLET | Refills: 0 | Status: SHIPPED | OUTPATIENT
Start: 2023-05-15 | End: 2023-05-15

## 2023-05-15 RX ORDER — ERGOCALCIFEROL 1.25 MG/1
50000 CAPSULE ORAL
Qty: 6 CAPSULE | Refills: 2 | Status: SHIPPED | OUTPATIENT
Start: 2023-05-15 | End: 2023-05-15

## 2023-05-15 RX ORDER — ERGOCALCIFEROL 1.25 MG/1
50000 CAPSULE ORAL
Qty: 2 CAPSULE | Refills: 11 | Status: SHIPPED | OUTPATIENT
Start: 2023-05-15

## 2023-05-15 RX ORDER — ROSUVASTATIN CALCIUM 5 MG/1
5 TABLET, COATED ORAL DAILY
Qty: 30 TABLET | Refills: 5 | Status: SHIPPED | OUTPATIENT
Start: 2023-05-15

## 2023-05-15 RX ORDER — AMOXICILLIN AND CLAVULANATE POTASSIUM 875; 125 MG/1; MG/1
1 TABLET, FILM COATED ORAL 2 TIMES DAILY
Qty: 28 TABLET | Refills: 0 | Status: SHIPPED | OUTPATIENT
Start: 2023-05-15 | End: 2023-05-29

## 2023-05-15 NOTE — PROGRESS NOTES
Name: Melissa Tipton      : 1965      MRN: [id number]  Encounter Provider: Dionicio Ball MD  Encounter Date: 5/15/2023   Encounter department: [address] [place] [address]      1  Hyperparathyroidism [facility])  Assessment & Plan:  Continue vit D2 every 2 weeks  Normal parathyroid scan (May 2023), persistent elevation of PTHi  DEXA scan 2023: Osteopenia  Patient prefers to hold off further follow-ups with endocrinology  We will continue monitoring PTH levels closely      2  Sjogren's syndrome, with unspecified organ involvement [facility])  Assessment & Plan:  Patient is under care of [facility]  Started MTX 3 weeks ago tolerates okay, no side effects          3  Vitamin D deficiency  -     ergocalciferol (VITAMIN D2) 50,000 units; Take 1 capsule (50,000 Units total) by mouth every 14 (fourteen) days 1 cap every 2 weeks    4  Bunion, right foot  -     Ambulatory referral to Podiatry; Future    5  Mixed hyperlipidemia  -     rosuvastatin (CRESTOR) 5 mg tablet; Take 1 tablet (5 mg total) by mouth daily    6  Acute recurrent sinusitis, unspecified location  -     amoxicillin-clavulanate (Augmentin) 875-125 mg per tablet; Take 1 tablet by mouth 2 (two) times a day for 14 days  -     fluconazole (DIFLUCAN) 150 mg tablet; Take 1 tablet (150 mg total) by mouth once for 1 dose          Return in about 4 months (around 9/15/2023) for Annual physical/well exam     Subjective     Follow-up chronic medical conditions  Patient developed symptoms of sinus infection last week  She started leftovers of Augmentin and took 4 doses so far, symptoms have improved  She denies fever, chest tightness improved after start of antibiotic therapy  Recent parathyroid scan is normal   Vitamin D level is normal as well  Level of PTH has been persistently elevated  Bone density scan was normal   Patient prefers to hold off further evaluation by endocrinology      Recent follow-up with rheumatology, patient has started on MTX weekly as per rheumatology - 3 rd week so far, she tolerates Rx well with no side effects    Painful bunion right foot- previous surgery  10 years ago          Review of Systems   Constitutional: Negative  HENT: Positive for congestion, postnasal drip and sinus pressure  Eyes: Negative  Respiratory: Positive for cough  Cardiovascular: Negative  Gastrointestinal: Negative  Genitourinary: Negative  Musculoskeletal: Negative  Neurological: Negative  Psychiatric/Behavioral: Negative          Past Medical History:   Diagnosis Date   • Anxiety     Disorder, per Allscripts   • Arthritis    • Disease of thyroid gland    • Dry eye syndrome    • Hypoparathyroidism ([facility] [address] )    • Osteoarthritis    • Psoriatic arthritis ([facility] [address] )      Past Surgical History:   Procedure Laterality Date   • BUNIONECTOMY     •  SECTION      2 X's   • GASTRIC BYPASS      For Morbid Obesity, per Allscripts   • HIP SURGERY     • MOUTH SURGERY     • SINUS SURGERY       Family History   Problem Relation Age of Onset   • Dementia Mother    • Osteopenia Mother    • Leukemia Father    • Diabetes unspecified Father    • Hypothyroidism Sister    • Breast cancer Maternal Aunt      Social History     Socioeconomic History   • Marital status: /Civil Union     Spouse name: None   • Number of children: 2   • Years of education: None   • Highest education level: None   Occupational History   • Occupation: currently works full-time   Tobacco Use   • Smoking status: Former     Packs/day: 0 50     Years: 5 00     Pack years: 2 50     Types: Cigarettes     Start date: 1993     Quit date: 2000     Years since quittin 9   • Smokeless tobacco: Former   • Tobacco comments:     Never a smoker, per Allscripts   Vaping Use   • Vaping Use: Never used   Substance and Sexual Activity   • Alcohol use: No   • Drug use: No   • Sexual activity: Not Currently     Partners: Male     Birth control/protection: Post-menopausal   Other Topics Concern   • None   Social History Narrative        2 children    Working currently    Always uses seat belt    College student    Exercise frequency     Feels safe at home     Social Determinants of Health     Financial Resource Strain: Not on file   Food Insecurity: Not on file   Transportation Needs: Not on file   Physical Activity: Not on file   Stress: Not on file   Social Connections: Not on file   Intimate Partner Violence: Not on file   Housing Stability: Not on file     Current Outpatient Medications on File Prior to Visit   Medication Sig   • amphetamine-dextroamphetamine (ADDERALL) 15 MG tablet Take 1 tablet by mouth daily as needed   • buPROPion (WELLBUTRIN XL) 300 mg 24 hr tablet Take 1 tablet by mouth daily   • cyanocobalamin 1,000 mcg/mL INJECT 1 ML ONCE A WEEK X 4 TIMES THEN EVERY 2 WEEKS X 4 TIMES THEN USE 1 ML EVERY 3-4 WEEKS   • cycloSPORINE, PF, (Cequa) 0 09 % SOLN    • diclofenac sodium (VOLTAREN) 1 % USE 2 GRAMS 4 TIMES DAILY AS NEEDED FOR UPPER BODY JOINTS AND 4 GRAMS FOR LOWER BODY JOINTS   • escitalopram (LEXAPRO) 20 mg tablet Take 1 tablet by mouth daily   • Fe-Succ Ac-C-Thre Ac-B12-FA (FERREX 150 FORTE PLUS)  MG CAPS Take by mouth   • folic acid (FOLVITE) 1 mg tablet Take 1 tablet (1 mg total) by mouth daily   • gabapentin (NEURONTIN) 100 mg capsule Take 300 mg by mouth 2 (two) times a day as needed     • levalbuterol (Xopenex HFA) 45 mcg/act inhaler Inhale 2 puffs every 6 (six) hours as needed for wheezing or shortness of breath (cough)   • levothyroxine 100 mcg tablet Take 1 tablet (100 mcg total) by mouth daily   • meloxicam (MOBIC) 15 mg tablet TAKE 1 TABLET ONCE A DAY AFTER FOOD AS NEEDED FOR JOINT PAIN   • methotrexate 2 5 MG tablet Take 4 tablets (10 mg total) by mouth once a week   • metoprolol tartrate (LOPRESSOR) 50 mg tablet TAKE 1 TABLET BY MOUTH EVERY DAY   • mometasone (NASONEX) 50 mcg/act nasal spray SPRAY 2 "SPRAYS INTO EACH NOSTRIL EVERY DAY   • pantoprazole (PROTONIX) 40 mg tablet TAKE ONE TABLET BY MOUTH EVERY DAY 30 MINUTES BEFORE BREAKFAST   • pilocarpine (SALAGEN) 5 mg tablet Take 1 tablet (5 mg total) by mouth 3 (three) times a day   • predniSONE 5 mg tablet 30 mg qd x 2 days,25 mg qd x 2 days,20 mg qd x 2 days,15 mg qd x2 days,10 mg qd x2 days,5mg qd x2 days, 2 5 mg x 2 days   • SYMBICORT 160-4 5 MCG/ACT inhaler TAKE 2 PUFFS BY MOUTH TWICE A DAY   • SYRINGE-NEEDLE, DISP, 3 ML (B-D SYRINGE/NEEDLE 3CC/23GX1\") 23G X 1\" 3 ML MISC Use  For vitamin B12 injections   • triamcinolone (KENALOG) 0 5 % ointment Apply 1 application topically 2 (two) times a day To affected area   • valACYclovir (VALTREX) 1,000 mg tablet Take by mouth   • Vyvanse 50 MG capsule Take 50 mg by mouth 2 (two) times a day     Allergies   Allergen Reactions   • Cyclosporine Hives, Itching and Rash   • Cephalexin Rash     Reaction Date: 28Jul2011;  Cephalosporin per patient    • Moxifloxacin Rash     Reaction Date: 18Apr2011;    • Sulfamethoxazole-Trimethoprim Rash     Immunization History   Administered Date(s) Administered   • COVID-19 PFIZER VACCINE 0 3 ML IM 01/17/2021, 02/05/2021, 11/20/2021   • INFLUENZA 03/12/2019   • Influenza Quadrivalent Preservative Free 3 years and older IM 11/28/2016   • Influenza, seasonal, injectable 10/14/2009, 09/16/2011, 01/10/2013   • Pneumococcal Polysaccharide PPV23 02/01/2013   • Tuberculin Skin Test-PPD Intradermal 12/13/2011       Objective     /58 (BP Location: Left arm, Patient Position: Sitting, Cuff Size: Large)   Pulse 61   Temp 97 6 °F (36 4 °C) (Temporal)   Resp 16   Ht 5' 2 5\" (1 588 m)   Wt 81 6 kg (179 lb 12 8 oz)   LMP  (LMP Unknown)   SpO2 100%   BMI 32 36 kg/m²     Physical Exam  Vitals and nursing note reviewed  Constitutional:       General: She is not in acute distress  Appearance: Normal appearance  She is well-developed  She is not ill-appearing     HENT:      Head: " Normocephalic and atraumatic  Right Ear: Tympanic membrane normal       Left Ear: Tympanic membrane normal       Nose: Mucosal edema and congestion present  Mouth/Throat:      Pharynx: Posterior oropharyngeal erythema (post-nasal drip) present  No oropharyngeal exudate  Eyes:      Conjunctiva/sclera: Conjunctivae normal    Cardiovascular:      Rate and Rhythm: Normal rate and regular rhythm  Heart sounds: Normal heart sounds  Pulmonary:      Effort: Pulmonary effort is normal  No respiratory distress  Breath sounds: Rhonchi present  No wheezing or rales  Comments: Increased exp  phase, no whezes  Musculoskeletal:      Cervical back: Neck supple  No rigidity  Comments: Severe arthritic deformity right big toe, prominent bunion   Lymphadenopathy:      Cervical: Cervical adenopathy present  Neurological:      General: No focal deficit present  Mental Status: She is alert and oriented to person, place, and time  Cranial Nerves: No cranial nerve deficit  Deep Tendon Reflexes: Reflexes are normal and symmetric  Psychiatric:         Mood and Affect: Mood normal          Behavior: Behavior normal          Thought Content:  Thought content normal        Clair Laguna MD

## 2023-05-15 NOTE — ASSESSMENT & PLAN NOTE
Continue vit D2 every 2 weeks  Normal parathyroid scan (May 2023), persistent elevation of PTHi  DEXA scan April 2023: Osteopenia  Patient prefers to hold off further follow-ups with endocrinology    We will continue monitoring PTH levels closely

## 2023-05-15 NOTE — ASSESSMENT & PLAN NOTE
Patient is under care of [facility] rheumatology    Started MTX 3 weeks ago tolerates okay, no side effects

## 2023-05-16 ENCOUNTER — OFFICE VISIT (OUTPATIENT)
Dept: PODIATRY | Facility: CLINIC | Age: 58
End: 2023-05-16

## 2023-05-16 ENCOUNTER — APPOINTMENT (OUTPATIENT)
Dept: RADIOLOGY | Age: 58
End: 2023-05-16
Payer: COMMERCIAL

## 2023-05-16 ENCOUNTER — PREP FOR PROCEDURE (OUTPATIENT)
Dept: OBGYN CLINIC | Facility: CLINIC | Age: 58
End: 2023-05-16

## 2023-05-16 VITALS — HEIGHT: 62 IN | WEIGHT: 181 LBS | BODY MASS INDEX: 33.31 KG/M2

## 2023-05-16 DIAGNOSIS — Z01.818 PRE-OPERATIVE CLEARANCE: Primary | ICD-10-CM

## 2023-05-16 DIAGNOSIS — M20.21 HALLUX RIGIDUS, RIGHT FOOT: Primary | ICD-10-CM

## 2023-05-16 DIAGNOSIS — M21.611 BUNION, RIGHT FOOT: ICD-10-CM

## 2023-05-16 DIAGNOSIS — Z96.9 RETAINED ORTHOPEDIC HARDWARE: ICD-10-CM

## 2023-05-16 PROCEDURE — 73630 X-RAY EXAM OF FOOT: CPT

## 2023-05-16 NOTE — PROGRESS NOTES
PATIENT:  Benitez Gilbert  1965       ASSESSMENT:     1  Hallux rigidus, right foot        2  Retained orthopedic hardware        3  Bunion, right foot  Ambulatory referral to Podiatry    XR foot 3+ vw right                PLAN:  1  Reviewed medical records  Reviewed the note from PCP  Patient was counseled and educated on the condition and the diagnosis  2  X-ray was obtained and personally reviewed  The radiological findings were discussed with the patient  Fairly severe DJD noted right 1st MPJ with significant osteophytes  No significantly increased LYNNETTE  3  The exam and symptoms are consistent with hallux rigidus of right foot rather than recurring bunion  The diagnosis, treatment options and prognosis were discussed with the patient  4  Discussed options including surgical treatment vs conservative care  Discussed different types of surgical procedure  5  The patient feels conservative cares do not help at this time and wishes to proceed with surgical treatment  She wished to have total joint replacement  Will plan right 1st MPJ implant and removing of hardware from previous surgery  Explained surgical details and post-op course  Discussed all risks and complications related to the patient's condition and surgery  The benefits of surgery were also discussed  The patient understood that the surgery would not guarantee desirable outcome  All questions and concerns were addressed and the consent was signed  6  Sent her for PAT and medical clearance  Imaging: I have personally reviewed pertinent films in PACS  Labs, pathology, and Other Studies: I have personally reviewed pertinent reports  I have spent a total time of 45 minutes on 05/16/23 in caring for this patient including Diagnostic results, Impressions, Counseling / Coordination of care, Documenting in the medical record and Reviewing / ordering tests, medicine, procedures          Subjective: HPI  The patient was referred to my office for evaluation of right foot pain  She had bunion surgery about 10 years ago  She feels it is coming back  She notices increased deformity and pain in the past year  She has hypertrophy of right great toe joint  She tried different shoes, pads, and meds without resolving her pain  She has difficulty wearing regular shoes  She feels her balance is poor due to foot pain  Denied any injury  No associated numbness or paresthesia  The following portions of the patient's history were reviewed and updated as appropriate: allergies, current medications, past family history, past medical history, past social history, past surgical history and problem list   All pertinent labs and images were reviewed        Past Medical History  Past Medical History:   Diagnosis Date   • Anxiety     Disorder, per [employer]   • Arthritis    • Disease of thyroid gland    • Dry eye syndrome    • Hypoparathyroidism ([facility] [address] )    • Osteoarthritis    • Psoriatic arthritis ([facility] [address] )        Past Surgical History  Past Surgical History:   Procedure Laterality Date   • BUNIONECTOMY     •  SECTION      2 X's   • GASTRIC BYPASS      For Morbid Obesity, per [employer]   • HIP SURGERY     • MOUTH SURGERY     • SINUS SURGERY          Allergies:  Cyclosporine, Cephalexin, Moxifloxacin, and Sulfamethoxazole-trimethoprim    Medications:  Current Outpatient Medications   Medication Sig Dispense Refill   • amoxicillin-clavulanate (Augmentin) 875-125 mg per tablet Take 1 tablet by mouth 2 (two) times a day for 14 days 28 tablet 0   • amphetamine-dextroamphetamine (ADDERALL) 15 MG tablet Take 1 tablet by mouth daily as needed     • buPROPion (WELLBUTRIN XL) 300 mg 24 hr tablet Take 1 tablet by mouth daily     • cyanocobalamin 1,000 mcg/mL INJECT 1 ML ONCE A WEEK X 4 TIMES THEN EVERY 2 WEEKS X 4 TIMES THEN USE 1 ML EVERY 3-4 WEEKS 10 mL 3   • cycloSPORINE, PF, (Cequa) 0 09 % SOLN      • diclofenac "sodium (VOLTAREN) 1 % USE 2 GRAMS 4 TIMES DAILY AS NEEDED FOR UPPER BODY JOINTS AND 4 GRAMS FOR LOWER BODY JOINTS 500 g 1   • ergocalciferol (VITAMIN D2) 50,000 units Take 1 capsule (50,000 Units total) by mouth every 14 (fourteen) days 1 cap every 2 weeks 2 capsule 11   • escitalopram (LEXAPRO) 20 mg tablet Take 1 tablet by mouth daily     • Fe-Succ Ac-C-Thre Ac-B12-FA (FERREX 150 FORTE PLUS)  MG CAPS Take by mouth     • folic acid (FOLVITE) 1 mg tablet Take 1 tablet (1 mg total) by mouth daily 30 tablet 5   • gabapentin (NEURONTIN) 100 mg capsule Take 300 mg by mouth 2 (two) times a day as needed       • levalbuterol (Xopenex HFA) 45 mcg/act inhaler Inhale 2 puffs every 6 (six) hours as needed for wheezing or shortness of breath (cough) 1 Inhaler 3   • levothyroxine 100 mcg tablet Take 1 tablet (100 mcg total) by mouth daily 90 tablet 0   • meloxicam (MOBIC) 15 mg tablet TAKE 1 TABLET ONCE A DAY AFTER FOOD AS NEEDED FOR JOINT PAIN 90 tablet 1   • methotrexate 2 5 MG tablet Take 4 tablets (10 mg total) by mouth once a week 16 tablet 3   • metoprolol tartrate (LOPRESSOR) 50 mg tablet TAKE 1 TABLET BY MOUTH EVERY DAY 90 tablet 3   • mometasone (NASONEX) 50 mcg/act nasal spray SPRAY 2 SPRAYS INTO EACH NOSTRIL EVERY DAY 51 g 3   • pantoprazole (PROTONIX) 40 mg tablet TAKE ONE TABLET BY MOUTH EVERY DAY 30 MINUTES BEFORE BREAKFAST 90 tablet 0   • pilocarpine (SALAGEN) 5 mg tablet Take 1 tablet (5 mg total) by mouth 3 (three) times a day 90 tablet 3   • predniSONE 5 mg tablet 30 mg qd x 2 days,25 mg qd x 2 days,20 mg qd x 2 days,15 mg qd x2 days,10 mg qd x2 days,5mg qd x2 days, 2 5 mg x 2 days 50 tablet 0   • rosuvastatin (CRESTOR) 5 mg tablet Take 1 tablet (5 mg total) by mouth daily 30 tablet 5   • SYMBICORT 160-4 5 MCG/ACT inhaler TAKE 2 PUFFS BY MOUTH TWICE A DAY 3 Inhaler 2   • SYRINGE-NEEDLE, DISP, 3 ML (B-D SYRINGE/NEEDLE 3CC/23GX1\") 23G X 1\" 3 ML MISC Use  For vitamin B12 injections 10 each 5   • " triamcinolone (KENALOG) 0 5 % ointment Apply 1 application topically 2 (two) times a day To affected area 30 g 0   • valACYclovir (VALTREX) 1,000 mg tablet Take by mouth     • Vyvanse 50 MG capsule Take 50 mg by mouth 2 (two) times a day       No current facility-administered medications for this visit  Social History:  Social History     Socioeconomic History   • Marital status: /Civil Union     Spouse name: None   • Number of children: 2   • Years of education: None   • Highest education level: None   Occupational History   • Occupation: currently works full-time   Tobacco Use   • Smoking status: Former     Packs/day: 0 50     Years: 5 00     Pack years: 2 50     Types: Cigarettes     Start date: 1993     Quit date: 2000     Years since quittin 9   • Smokeless tobacco: Former   • Tobacco comments:     Never a smoker, per [employer]   Vaping Use   • Vaping Use: Never used   Substance and Sexual Activity   • Alcohol use: No   • Drug use: No   • Sexual activity: Not Currently     Partners: Male     Birth control/protection: Post-menopausal   Other Topics Concern   • None   Social History Narrative        2 children    Working currently    Always uses seat belt    College student    Exercise frequency     Feels safe at home     Social Determinants of Health     Financial Resource Strain: Not on file   Food Insecurity: Not on file   Transportation Needs: Not on file   Physical Activity: Not on file   Stress: Not on file   Social Connections: Not on file   Intimate Partner Violence: Not on file   Housing Stability: Not on file          Review of Systems   Constitutional: Negative for chills and fever  HENT: Negative for sore throat  Respiratory: Negative for cough and shortness of breath  Cardiovascular: Negative for chest pain and leg swelling  Gastrointestinal: Negative for nausea and vomiting  Musculoskeletal: Positive for arthralgias  Skin: Negative for rash and wound  "  Neurological: Negative for weakness and numbness  Hematological: Negative  Psychiatric/Behavioral: Negative for behavioral problems and confusion  Objective:      Ht 5' 2\" (1 575 m)   Wt 82 1 kg (181 lb)   LMP  (LMP Unknown)   BMI 33 11 kg/m²          Physical Exam  Vitals reviewed  Constitutional:       General: She is not in acute distress  Appearance: She is not toxic-appearing or diaphoretic  HENT:      Head: Normocephalic and atraumatic  Eyes:      Extraocular Movements: Extraocular movements intact  Cardiovascular:      Rate and Rhythm: Normal rate and regular rhythm  Pulses: Normal pulses  Dorsalis pedis pulses are 2+ on the right side and 2+ on the left side  Posterior tibial pulses are 2+ on the right side and 2+ on the left side  Pulmonary:      Effort: Pulmonary effort is normal  No respiratory distress  Musculoskeletal:         General: Tenderness and deformity present  No signs of injury  Cervical back: Normal range of motion and neck supple  Right lower leg: No edema  Left lower leg: No edema  Right foot: No foot drop  Left foot: No foot drop  Comments: Dorsal and medial hypertrophy and pain in right 1st MPJ  Severely limited ROM right 1st MPJ  Skin:     General: Skin is warm  Capillary Refill: Capillary refill takes less than 2 seconds  Coloration: Skin is not cyanotic or mottled  Findings: No abscess, ecchymosis or wound  Nails: There is no clubbing  Neurological:      General: No focal deficit present  Mental Status: She is alert and oriented to person, place, and time  Cranial Nerves: No cranial nerve deficit  Sensory: No sensory deficit  Motor: No weakness  Coordination: Coordination normal    Psychiatric:         Mood and Affect: Mood normal          Behavior: Behavior normal          Thought Content:  Thought content normal          Judgment: Judgment normal  "

## 2023-05-16 NOTE — LETTER
May 16, 2023     Juan Reyes MD  [address] [facility] [address]    Patient: Gato Cook   YOB: 1965   Date of Visit: 5/16/2023       Dear Dr Denis Trujillo: Thank you for referring Gato Cook to me for evaluation  Below are my notes for this consultation  If you have questions, please do not hesitate to call me  I look forward to following your patient along with you  Sincerely,        Chloé Hernández DPM        CC: No Recipients  Wayne Brito  5/16/2023  6:54 PM  Sign when Signing Visit                 PATIENT:  Gato Cook  1965       ASSESSMENT:    1  Hallux rigidus, right foot        2  Retained orthopedic hardware        3  Bunion, right foot  Ambulatory referral to Podiatry    XR foot 3+ vw right               PLAN:  1  Reviewed medical records  Reviewed the note from PCP  Patient was counseled and educated on the condition and the diagnosis  2  X-ray was obtained and personally reviewed  The radiological findings were discussed with the patient  Fairly severe DJD noted right 1st MPJ with significant osteophytes  No significantly increased LYNNETTE  3  The exam and symptoms are consistent with hallux rigidus of right foot rather than recurring bunion  The diagnosis, treatment options and prognosis were discussed with the patient  4  Discussed options including surgical treatment vs conservative care  Discussed different types of surgical procedure  5  The patient feels conservative cares do not help at this time and wishes to proceed with surgical treatment  She wished to have total joint replacement  Will plan right 1st MPJ implant and removing of hardware from previous surgery  Explained surgical details and post-op course  Discussed all risks and complications related to the patient's condition and surgery  The benefits of surgery were also discussed    The patient understood that the surgery would not guarantee desirable outcome  All questions and concerns were addressed and the consent was signed  6  Sent her for PAT and medical clearance  Imaging: I have personally reviewed pertinent films in PACS  Labs, pathology, and Other Studies: I have personally reviewed pertinent reports  I have spent a total time of 45 minutes on 23 in caring for this patient including Diagnostic results, Impressions, Counseling / Coordination of care, Documenting in the medical record and Reviewing / ordering tests, medicine, procedures    Subjective:     HPI  The patient was referred to my office for evaluation of right foot pain  She had bunion surgery about 10 years ago  She feels it is coming back  She notices increased deformity and pain in the past year  She has hypertrophy of right great toe joint  She tried different shoes, pads, and meds without resolving her pain  She has difficulty wearing regular shoes  She feels her balance is poor due to foot pain  Denied any injury  No associated numbness or paresthesia  The following portions of the patient's history were reviewed and updated as appropriate: allergies, current medications, past family history, past medical history, past social history, past surgical history and problem list   All pertinent labs and images were reviewed        Past Medical History  Past Medical History:   Diagnosis Date   • Anxiety     Disorder, per [employer]scripts   • Arthritis    • Disease of thyroid gland    • Dry eye syndrome    • Hypoparathyroidism ([address] )    • Osteoarthritis    • Psoriatic arthritis ([facility] [address] )        Past Surgical History  Past Surgical History:   Procedure Laterality Date   • BUNIONECTOMY     •  SECTION      2 X's   • GASTRIC BYPASS      For Morbid Obesity, per [employer]   • HIP SURGERY     • MOUTH SURGERY     • SINUS SURGERY          Allergies:  Cyclosporine, Cephalexin, Moxifloxacin, and Sulfamethoxazole-trimethoprim    Medications:  Current Outpatient Medications   Medication Sig Dispense Refill   • amoxicillin-clavulanate (Augmentin) 875-125 mg per tablet Take 1 tablet by mouth 2 (two) times a day for 14 days 28 tablet 0   • amphetamine-dextroamphetamine (ADDERALL) 15 MG tablet Take 1 tablet by mouth daily as needed     • buPROPion (WELLBUTRIN XL) 300 mg 24 hr tablet Take 1 tablet by mouth daily     • cyanocobalamin 1,000 mcg/mL INJECT 1 ML ONCE A WEEK X 4 TIMES THEN EVERY 2 WEEKS X 4 TIMES THEN USE 1 ML EVERY 3-4 WEEKS 10 mL 3   • cycloSPORINE, PF, (Cequa) 0 09 % SOLN      • diclofenac sodium (VOLTAREN) 1 % USE 2 GRAMS 4 TIMES DAILY AS NEEDED FOR UPPER BODY JOINTS AND 4 GRAMS FOR LOWER BODY JOINTS 500 g 1   • ergocalciferol (VITAMIN D2) 50,000 units Take 1 capsule (50,000 Units total) by mouth every 14 (fourteen) days 1 cap every 2 weeks 2 capsule 11   • escitalopram (LEXAPRO) 20 mg tablet Take 1 tablet by mouth daily     • Fe-Succ Ac-C-Thre Ac-B12-FA (FERREX 150 FORTE PLUS)  MG CAPS Take by mouth     • folic acid (FOLVITE) 1 mg tablet Take 1 tablet (1 mg total) by mouth daily 30 tablet 5   • gabapentin (NEURONTIN) 100 mg capsule Take 300 mg by mouth 2 (two) times a day as needed       • levalbuterol (Xopenex HFA) 45 mcg/act inhaler Inhale 2 puffs every 6 (six) hours as needed for wheezing or shortness of breath (cough) 1 Inhaler 3   • levothyroxine 100 mcg tablet Take 1 tablet (100 mcg total) by mouth daily 90 tablet 0   • meloxicam (MOBIC) 15 mg tablet TAKE 1 TABLET ONCE A DAY AFTER FOOD AS NEEDED FOR JOINT PAIN 90 tablet 1   • methotrexate 2 5 MG tablet Take 4 tablets (10 mg total) by mouth once a week 16 tablet 3   • metoprolol tartrate (LOPRESSOR) 50 mg tablet TAKE 1 TABLET BY MOUTH EVERY DAY 90 tablet 3   • mometasone (NASONEX) 50 mcg/act nasal spray SPRAY 2 SPRAYS INTO EACH NOSTRIL EVERY DAY 51 g 3   • pantoprazole (PROTONIX) 40 mg tablet TAKE ONE TABLET BY MOUTH EVERY DAY 30 MINUTES BEFORE BREAKFAST 90 tablet 0   • pilocarpine (SALAGEN) 5 mg "tablet Take 1 tablet (5 mg total) by mouth 3 (three) times a day 90 tablet 3   • predniSONE 5 mg tablet 30 mg qd x 2 days,25 mg qd x 2 days,20 mg qd x 2 days,15 mg qd x2 days,10 mg qd x2 days,5mg qd x2 days, 2 5 mg x 2 days 50 tablet 0   • rosuvastatin (CRESTOR) 5 mg tablet Take 1 tablet (5 mg total) by mouth daily 30 tablet 5   • SYMBICORT 160-4 5 MCG/ACT inhaler TAKE 2 PUFFS BY MOUTH TWICE A DAY 3 Inhaler 2   • SYRINGE-NEEDLE, DISP, 3 ML (B-D SYRINGE/NEEDLE 3CC/23GX1\") 23G X 1\" 3 ML MISC Use  For vitamin B12 injections 10 each 5   • triamcinolone (KENALOG) 0 5 % ointment Apply 1 application topically 2 (two) times a day To affected area 30 g 0   • valACYclovir (VALTREX) 1,000 mg tablet Take by mouth     • Vyvanse 50 MG capsule Take 50 mg by mouth 2 (two) times a day       No current facility-administered medications for this visit         Social History:  Social History     Socioeconomic History   • Marital status: /Civil Union     Spouse name: None   • Number of children: 2   • Years of education: None   • Highest education level: None   Occupational History   • Occupation: currently works full-time   Tobacco Use   • Smoking status: Former     Packs/day: 0 50     Years: 5 00     Pack years: 2 50     Types: Cigarettes     Start date: 1993     Quit date: 2000     Years since quittin 9   • Smokeless tobacco: Former   • Tobacco comments:     Never a smoker, per [employer]scripts   Vaping Use   • Vaping Use: Never used   Substance and Sexual Activity   • Alcohol use: No   • Drug use: No   • Sexual activity: Not Currently     Partners: Male     Birth control/protection: Post-menopausal   Other Topics Concern   • None   Social History Narrative        2 children    Working currently    Always uses seat belt    College student    Exercise frequency     Feels safe at home     Social Determinants of Health     Financial Resource Strain: Not on file   Food Insecurity: Not on file   Transportation Needs: " "Not on file   Physical Activity: Not on file   Stress: Not on file   Social Connections: Not on file   Intimate Partner Violence: Not on file   Housing Stability: Not on file          Review of Systems   Constitutional: Negative for chills and fever  HENT: Negative for sore throat  Respiratory: Negative for cough and shortness of breath  Cardiovascular: Negative for chest pain and leg swelling  Gastrointestinal: Negative for nausea and vomiting  Musculoskeletal: Positive for arthralgias  Skin: Negative for rash and wound  Neurological: Negative for weakness and numbness  Hematological: Negative  Psychiatric/Behavioral: Negative for behavioral problems and confusion  Objective:      Ht 5' 2\" (1 575 m)   Wt 82 1 kg (181 lb)   LMP  (LMP Unknown)   BMI 33 11 kg/m²         Physical Exam  Vitals reviewed  Constitutional:       General: She is not in acute distress  Appearance: She is not toxic-appearing or diaphoretic  HENT:      Head: Normocephalic and atraumatic  Eyes:      Extraocular Movements: Extraocular movements intact  Cardiovascular:      Rate and Rhythm: Normal rate and regular rhythm  Pulses: Normal pulses  Dorsalis pedis pulses are 2+ on the right side and 2+ on the left side  Posterior tibial pulses are 2+ on the right side and 2+ on the left side  Pulmonary:      Effort: Pulmonary effort is normal  No respiratory distress  Musculoskeletal:         General: Tenderness and deformity present  No signs of injury  Cervical back: Normal range of motion and neck supple  Right lower leg: No edema  Left lower leg: No edema  Right foot: No foot drop  Left foot: No foot drop  Comments: Dorsal and medial hypertrophy and pain in right 1st MPJ  Severely limited ROM right 1st MPJ  Skin:     General: Skin is warm  Capillary Refill: Capillary refill takes less than 2 seconds        Coloration: Skin is not cyanotic or " mottled  Findings: No abscess, ecchymosis or wound  Nails: There is no clubbing  Neurological:      General: No focal deficit present  Mental Status: She is alert and oriented to person, place, and time  Cranial Nerves: No cranial nerve deficit  Sensory: No sensory deficit  Motor: No weakness  Coordination: Coordination normal    Psychiatric:         Mood and Affect: Mood normal          Behavior: Behavior normal          Thought Content:  Thought content normal          Judgment: Judgment normal

## 2023-05-17 PROBLEM — M21.611 BUNION, RIGHT FOOT: Status: ACTIVE | Noted: 2023-05-17

## 2023-06-01 DIAGNOSIS — R11.0 NAUSEA: Primary | ICD-10-CM

## 2023-06-01 DIAGNOSIS — Z79.899 HIGH RISK MEDICATION USE: ICD-10-CM

## 2023-06-01 RX ORDER — ONDANSETRON 4 MG/1
4 TABLET, FILM COATED ORAL EVERY 12 HOURS PRN
Qty: 28 TABLET | Refills: 0 | Status: SHIPPED | OUTPATIENT
Start: 2023-06-01 | End: 2023-06-15

## 2023-06-01 RX ORDER — FOLIC ACID 1 MG/1
1 TABLET ORAL DAILY
Qty: 90 TABLET | Refills: 3 | Status: SHIPPED | OUTPATIENT
Start: 2023-06-01 | End: 2023-08-30

## 2023-06-02 ENCOUNTER — TELEPHONE (OUTPATIENT)
Dept: PODIATRY | Facility: CLINIC | Age: 58
End: 2023-06-02

## 2023-06-02 ENCOUNTER — PREP FOR PROCEDURE (OUTPATIENT)
Dept: PODIATRY | Facility: CLINIC | Age: 58
End: 2023-06-02

## 2023-06-02 DIAGNOSIS — M20.21 HALLUX RIGIDUS OF RIGHT FOOT: Primary | ICD-10-CM

## 2023-06-02 DIAGNOSIS — Z96.9 RETAINED ORTHOPEDIC HARDWARE: ICD-10-CM

## 2023-06-02 RX ORDER — CHLORHEXIDINE GLUCONATE 0.12 MG/ML
15 RINSE ORAL ONCE
OUTPATIENT
Start: 2023-07-14

## 2023-06-02 RX ORDER — CHLORHEXIDINE GLUCONATE 4 G/100ML
SOLUTION TOPICAL DAILY PRN
OUTPATIENT
Start: 2023-07-14

## 2023-06-13 DIAGNOSIS — R11.0 NAUSEA: ICD-10-CM

## 2023-06-13 DIAGNOSIS — Z79.899 HIGH RISK MEDICATION USE: ICD-10-CM

## 2023-06-13 RX ORDER — ONDANSETRON 4 MG/1
4 TABLET, FILM COATED ORAL EVERY 12 HOURS PRN
Qty: 28 TABLET | Refills: 0 | Status: SHIPPED | OUTPATIENT
Start: 2023-06-13 | End: 2023-06-27

## 2023-06-14 ENCOUNTER — TELEPHONE (OUTPATIENT)
Dept: RHEUMATOLOGY | Facility: CLINIC | Age: 58
End: 2023-06-14

## 2023-06-14 NOTE — TELEPHONE ENCOUNTER
Please initiate PA for Rinvoq 15mg tablet   (1 tab daily)    Diagnosis: Psoriatic arthritis    Tried and failed: Humira, methotrexate, prednisone, meloxicam, Voltaren gel

## 2023-06-15 ENCOUNTER — TELEPHONE (OUTPATIENT)
Dept: RHEUMATOLOGY | Facility: CLINIC | Age: 58
End: 2023-06-15

## 2023-06-15 DIAGNOSIS — L40.50 PSORIATIC ARTHROPATHY (HCC): Primary | ICD-10-CM

## 2023-06-15 DIAGNOSIS — Z79.899 HIGH RISK MEDICATION USE: ICD-10-CM

## 2023-06-15 RX ORDER — UPADACITINIB 15 MG/1
1 TABLET, EXTENDED RELEASE ORAL DAILY
Qty: 30 TABLET | Refills: 6 | Status: SHIPPED | OUTPATIENT
Start: 2023-06-15 | End: 2023-07-15

## 2023-06-19 DIAGNOSIS — K21.9 CHRONIC GERD: ICD-10-CM

## 2023-06-19 RX ORDER — PANTOPRAZOLE SODIUM 40 MG/1
TABLET, DELAYED RELEASE ORAL
Qty: 90 TABLET | Refills: 0 | Status: SHIPPED | OUTPATIENT
Start: 2023-06-19

## 2023-07-05 ENCOUNTER — OFFICE VISIT (OUTPATIENT)
Dept: FAMILY MEDICINE CLINIC | Facility: CLINIC | Age: 58
End: 2023-07-05
Payer: COMMERCIAL

## 2023-07-05 VITALS
HEART RATE: 69 BPM | RESPIRATION RATE: 16 BRPM | BODY MASS INDEX: 32.32 KG/M2 | HEIGHT: 63 IN | WEIGHT: 182.4 LBS | DIASTOLIC BLOOD PRESSURE: 78 MMHG | TEMPERATURE: 97.5 F | OXYGEN SATURATION: 99 % | SYSTOLIC BLOOD PRESSURE: 118 MMHG

## 2023-07-05 DIAGNOSIS — Z01.818 PRE-OPERATIVE CLEARANCE: Primary | ICD-10-CM

## 2023-07-05 DIAGNOSIS — K91.2 POSTGASTRECTOMY MALABSORPTION: ICD-10-CM

## 2023-07-05 DIAGNOSIS — Z90.3 POSTGASTRECTOMY MALABSORPTION: ICD-10-CM

## 2023-07-05 DIAGNOSIS — M21.611 BUNION, RIGHT FOOT: ICD-10-CM

## 2023-07-05 DIAGNOSIS — E03.9 HYPOTHYROIDISM, UNSPECIFIED TYPE: ICD-10-CM

## 2023-07-05 DIAGNOSIS — I10 ESSENTIAL HYPERTENSION: ICD-10-CM

## 2023-07-05 DIAGNOSIS — J45.40 MODERATE PERSISTENT REACTIVE AIRWAY DISEASE WITHOUT COMPLICATION: ICD-10-CM

## 2023-07-05 DIAGNOSIS — L40.50 PSORIASIS WITH ARTHROPATHY (HCC): Chronic | ICD-10-CM

## 2023-07-05 PROCEDURE — 93000 ELECTROCARDIOGRAM COMPLETE: CPT | Performed by: FAMILY MEDICINE

## 2023-07-05 PROCEDURE — 99214 OFFICE O/P EST MOD 30 MIN: CPT | Performed by: FAMILY MEDICINE

## 2023-07-05 RX ORDER — DOXYCYCLINE HYCLATE 100 MG/1
100 CAPSULE ORAL
Qty: 14 CAPSULE | Refills: 0 | Status: SHIPPED | OUTPATIENT
Start: 2023-07-05 | End: 2023-07-12

## 2023-07-05 RX ORDER — METHYLPREDNISOLONE 4 MG/1
TABLET ORAL
Qty: 21 EACH | Refills: 0 | Status: SHIPPED | OUTPATIENT
Start: 2023-07-05 | End: 2023-07-14 | Stop reason: HOSPADM

## 2023-07-08 PROBLEM — J32.9 SINUSITIS: Status: RESOLVED | Noted: 2019-03-11 | Resolved: 2023-07-08

## 2023-07-09 NOTE — PROGRESS NOTES
Name: Roz Villa      : 1965      MRN: [id number]  Encounter Provider: Susy Yao MD  Encounter Date: 2023   Encounter department: [address] [place] [address]     1. Pre-operative clearance  Comments:  Patient is acceptable risk for planned surgery, labs are pending  Orders:  -     POCT ECG    2. Bunion, right foot    3. Moderate persistent reactive airway disease without complication  Assessment & Plan:  Start Medrol Dosepak and doxycycline. Orders:  -     methylPREDNISolone 4 MG tablet therapy pack; Use as directed on package  -     doxycycline hyclate (VIBRAMYCIN) 100 mg capsule; Take 1 capsule (100 mg total) by mouth 2 (two) times daily after meals for 7 days    4. Postgastrectomy malabsorption  Assessment & Plan:  Pending complete blood work      5. Psoriasis with arthropathy [facility])  Assessment & Plan:  Patient is under care of rheumatology      6. Essential hypertension  Assessment & Plan:  Blood pressure is well controlled, continue metoprolol      7. Hypothyroidism, unspecified type  Assessment & Plan:  Continue levothyroxine, pending TFTs             Subjective     Patient presents for preop evaluation. Surgery scheduled with [facility], Dr. Javier Horner, . CHEILECTOMY WITH TOTAL JOINT REPLACEMENT RIGHT 1ST MPJ (Right: Foot)       REMOVAL HARDWARE FOOT RIGHT FOOT (Right: Foot)    Patient offers no complaints of chest pain, palpitations, shortness of breath or dizziness. She reports worsening of seasonal allergies and asthma symptoms within past few days due to humidity. She is concerned about recurrence of sinus infection and postnasal drip. Patient is afebrile. Pending labs including pre-op BMP and CBC      Review of Systems   Constitutional: Negative. HENT: Positive for congestion and postnasal drip. Eyes: Negative. Respiratory: Negative for chest tightness and shortness of breath. Cardiovascular: Negative. Gastrointestinal: Negative. Endocrine: Negative. Genitourinary: Negative. Musculoskeletal: Positive for arthralgias. Allergic/Immunologic: Positive for environmental allergies. Neurological: Negative. Psychiatric/Behavioral: Negative.         Past Medical History:   Diagnosis Date   • Anxiety     Disorder, per [employer]   • Arthritis    • Disease of thyroid gland    • Dry eye syndrome    • Hypoparathyroidism ([address])    • Osteoarthritis    • Psoriatic arthritis ([address])      Past Surgical History:   Procedure Laterality Date   • BUNIONECTOMY     •  SECTION      2 X's   • GASTRIC BYPASS      For Morbid Obesity, per [employer]   • HIP SURGERY     • MOUTH SURGERY     • SINUS SURGERY       Family History   Problem Relation Age of Onset   • Dementia Mother    • Osteopenia Mother    • Leukemia Father    • Diabetes unspecified Father    • Hypothyroidism Sister    • Breast cancer Maternal Aunt      Social History     Socioeconomic History   • Marital status: /Civil Union     Spouse name: None   • Number of children: 2   • Years of education: None   • Highest education level: None   Occupational History   • Occupation: currently works full-time   Tobacco Use   • Smoking status: Former     Packs/day: 0.50     Years: 5.00     Total pack years: 2.50     Types: Cigarettes     Start date: 1993     Quit date: 2000     Years since quittin.1   • Smokeless tobacco: Former   • Tobacco comments:     Never a smoker, per [employer]   Vaping Use   • Vaping Use: Never used   Substance and Sexual Activity   • Alcohol use: No   • Drug use: No   • Sexual activity: Not Currently     Partners: Male     Birth control/protection: Post-menopausal   Other Topics Concern   • None   Social History Narrative        2 children    Working currently    Always uses seat belt    College student    Exercise frequency     Feels safe at home     Social Determinants of Health     Financial Resource Strain: Not on file   Food Insecurity: Not on file   Transportation Needs: Not on file   Physical Activity: Not on file   Stress: Not on file   Social Connections: Not on file   Intimate Partner Violence: Not on file   Housing Stability: Not on file     Current Outpatient Medications on File Prior to Visit   Medication Sig   • amphetamine-dextroamphetamine (ADDERALL) 15 MG tablet Take 1 tablet by mouth daily as needed   • buPROPion (WELLBUTRIN XL) 300 mg 24 hr tablet Take 1 tablet by mouth daily   • cyanocobalamin 1,000 mcg/mL INJECT 1 ML ONCE A WEEK X 4 TIMES THEN EVERY 2 WEEKS X 4 TIMES THEN USE 1 ML EVERY 3-4 WEEKS   • diclofenac sodium (VOLTAREN) 1 % USE 2 GRAMS 4 TIMES DAILY AS NEEDED FOR UPPER BODY JOINTS AND 4 GRAMS FOR LOWER BODY JOINTS   • ergocalciferol (VITAMIN D2) 50,000 units Take 1 capsule (50,000 Units total) by mouth every 14 (fourteen) days 1 cap every 2 weeks   • escitalopram (LEXAPRO) 20 mg tablet Take 1 tablet by mouth daily   • folic acid (FOLVITE) 1 mg tablet Take 1 tablet (1 mg total) by mouth daily   • gabapentin (NEURONTIN) 100 mg capsule Take 300 mg by mouth 2 (two) times a day as needed     • levalbuterol (Xopenex HFA) 45 mcg/act inhaler Inhale 2 puffs every 6 (six) hours as needed for wheezing or shortness of breath (cough)   • levothyroxine 100 mcg tablet Take 1 tablet (100 mcg total) by mouth daily   • meloxicam (MOBIC) 15 mg tablet Take 1 tablet (15 mg total) by mouth daily as needed for moderate pain   • metoprolol tartrate (LOPRESSOR) 50 mg tablet TAKE 1 TABLET BY MOUTH EVERY DAY   • mometasone (NASONEX) 50 mcg/act nasal spray 2 sprays into each nostril daily   • pantoprazole (PROTONIX) 40 mg tablet TAKE ONE TABLET BY MOUTH EVERY DAY 30 MINUTES BEFORE BREAKFAST   • pilocarpine (SALAGEN) 5 mg tablet Take 1 tablet (5 mg total) by mouth 3 (three) times a day   • rosuvastatin (CRESTOR) 5 mg tablet Take 1 tablet (5 mg total) by mouth daily   • SYMBICORT 160-4.5 MCG/ACT inhaler TAKE 2 PUFFS BY MOUTH TWICE A DAY   • SYRINGE-NEEDLE, DISP, 3 ML (B-D SYRINGE/NEEDLE 3CC/23GX1") 23G X 1" 3 ML MISC Use  For vitamin B12 injections   • triamcinolone (KENALOG) 0.5 % ointment Apply 1 application topically 2 (two) times a day To affected area   • valACYclovir (VALTREX) 1,000 mg tablet Take 2,000 mg by mouth 2 (two) times a day as needed 2 Tablets Twice Daily As Needed (4 tablets total)   • Vyvanse 50 MG capsule Take 50 mg by mouth 2 (two) times a day   • Upadacitinib ER (Rinvoq) 15 MG TB24 Take 1 tablet by mouth daily (Patient not taking: Reported on 7/5/2023)     Allergies   Allergen Reactions   • Cyclosporine Hives, Itching and Rash   • Cephalexin Rash     Reaction Date: 28Jul2011;  Cephalosporin per patient    • Moxifloxacin Rash     Reaction Date: 18Apr2011;    • Sulfamethoxazole-Trimethoprim Rash     Immunization History   Administered Date(s) Administered   • COVID-19 PFIZER VACCINE 0.3 ML IM 01/17/2021, 02/05/2021, 11/20/2021   • INFLUENZA 03/12/2019   • Influenza Quadrivalent Preservative Free 3 years and older IM 11/28/2016   • Influenza, seasonal, injectable 10/14/2009, 09/16/2011, 01/10/2013   • Pneumococcal Polysaccharide PPV23 02/01/2013   • Tuberculin Skin Test-PPD Intradermal 12/13/2011       Objective     /78 (BP Location: Left arm, Patient Position: Sitting, Cuff Size: Large)   Pulse 69   Temp 97.5 °F (36.4 °C) (Temporal)   Resp 16   Ht 5' 2.5" (1.588 m)   Wt 82.7 kg (182 lb 6.4 oz)   LMP  (LMP Unknown)   SpO2 99%   BMI 32.83 kg/m²     Physical Exam  Vitals and nursing note reviewed. Constitutional:       General: She is not in acute distress. Appearance: Normal appearance. She is well-developed. She is not ill-appearing. HENT:      Head: Normocephalic and atraumatic. Right Ear: Tympanic membrane normal.      Left Ear: Tympanic membrane normal.      Nose: Congestion present.       Mouth/Throat:      Mouth: Mucous membranes are moist.      Pharynx: No oropharyngeal exudate or posterior oropharyngeal erythema. Eyes:      Conjunctiva/sclera: Conjunctivae normal.   Neck:      Thyroid: No thyromegaly. Vascular: No carotid bruit. Cardiovascular:      Rate and Rhythm: Normal rate and regular rhythm. Heart sounds: Normal heart sounds. No murmur heard. Pulmonary:      Effort: Pulmonary effort is normal. Prolonged expiration present. No respiratory distress. Breath sounds: Normal breath sounds. No wheezing or rhonchi. Abdominal:      General: There is no abdominal bruit. Musculoskeletal:         General: Normal range of motion. Cervical back: Neck supple. Right lower leg: No edema. Left lower leg: No edema. Skin:     General: Skin is warm. Neurological:      Mental Status: She is alert and oriented to person, place, and time. Cranial Nerves: No cranial nerve deficit. Coordination: Coordination normal.   Psychiatric:         Mood and Affect: Mood normal.         Behavior: Behavior normal.         Thought Content:  Thought content normal.       Results for orders placed or performed in visit on 07/05/23   POCT ECG    Narrative    EKG scanned into Media    Impression    Normal sinus rhythm, 68 bpm, no acute ischemic changes       Selena Miranda MD

## 2023-07-10 ENCOUNTER — APPOINTMENT (OUTPATIENT)
Dept: LAB | Facility: CLINIC | Age: 58
End: 2023-07-10
Payer: COMMERCIAL

## 2023-07-10 DIAGNOSIS — E03.9 HYPOTHYROIDISM, UNSPECIFIED TYPE: ICD-10-CM

## 2023-07-10 DIAGNOSIS — E78.2 MIXED HYPERLIPIDEMIA: ICD-10-CM

## 2023-07-10 DIAGNOSIS — Z01.818 PRE-OPERATIVE CLEARANCE: ICD-10-CM

## 2023-07-10 DIAGNOSIS — E21.3 HYPERPARATHYROIDISM (HCC): ICD-10-CM

## 2023-07-10 DIAGNOSIS — E55.9 VITAMIN D DEFICIENCY: ICD-10-CM

## 2023-07-10 LAB
25(OH)D3 SERPL-MCNC: 51.9 NG/ML (ref 30–100)
ALBUMIN SERPL BCP-MCNC: 4.1 G/DL (ref 3.5–5)
ALP SERPL-CCNC: 85 U/L (ref 34–104)
ALT SERPL W P-5'-P-CCNC: 33 U/L (ref 7–52)
ANION GAP SERPL CALCULATED.3IONS-SCNC: 8 MMOL/L
AST SERPL W P-5'-P-CCNC: 26 U/L (ref 13–39)
BASOPHILS # BLD AUTO: 0.08 THOUSANDS/ÂΜL (ref 0–0.1)
BASOPHILS NFR BLD AUTO: 1 % (ref 0–1)
BILIRUB SERPL-MCNC: 0.49 MG/DL (ref 0.2–1)
BUN SERPL-MCNC: 21 MG/DL (ref 5–25)
CALCIUM SERPL-MCNC: 8.9 MG/DL (ref 8.4–10.2)
CHLORIDE SERPL-SCNC: 99 MMOL/L (ref 96–108)
CHOLEST SERPL-MCNC: 191 MG/DL
CO2 SERPL-SCNC: 30 MMOL/L (ref 21–32)
CREAT SERPL-MCNC: 0.99 MG/DL (ref 0.6–1.3)
EOSINOPHIL # BLD AUTO: 0.16 THOUSAND/ÂΜL (ref 0–0.61)
EOSINOPHIL NFR BLD AUTO: 1 % (ref 0–6)
ERYTHROCYTE [DISTWIDTH] IN BLOOD BY AUTOMATED COUNT: 12.5 % (ref 11.6–15.1)
GFR SERPL CREATININE-BSD FRML MDRD: 63 ML/MIN/1.73SQ M
GLUCOSE P FAST SERPL-MCNC: 138 MG/DL (ref 65–99)
HCT VFR BLD AUTO: 38.2 % (ref 34.8–46.1)
HDLC SERPL-MCNC: 78 MG/DL
HGB BLD-MCNC: 12.2 G/DL (ref 11.5–15.4)
IMM GRANULOCYTES # BLD AUTO: 0.1 THOUSAND/UL (ref 0–0.2)
IMM GRANULOCYTES NFR BLD AUTO: 1 % (ref 0–2)
LDLC SERPL CALC-MCNC: 93 MG/DL (ref 0–100)
LYMPHOCYTES # BLD AUTO: 1.96 THOUSANDS/ÂΜL (ref 0.6–4.47)
LYMPHOCYTES NFR BLD AUTO: 18 % (ref 14–44)
MCH RBC QN AUTO: 31.1 PG (ref 26.8–34.3)
MCHC RBC AUTO-ENTMCNC: 31.9 G/DL (ref 31.4–37.4)
MCV RBC AUTO: 97 FL (ref 82–98)
MONOCYTES # BLD AUTO: 0.53 THOUSAND/ÂΜL (ref 0.17–1.22)
MONOCYTES NFR BLD AUTO: 5 % (ref 4–12)
NEUTROPHILS # BLD AUTO: 8.23 THOUSANDS/ÂΜL (ref 1.85–7.62)
NEUTS SEG NFR BLD AUTO: 74 % (ref 43–75)
NRBC BLD AUTO-RTO: 0 /100 WBCS
PLATELET # BLD AUTO: 342 THOUSANDS/UL (ref 149–390)
PMV BLD AUTO: 9.1 FL (ref 8.9–12.7)
POTASSIUM SERPL-SCNC: 4.3 MMOL/L (ref 3.5–5.3)
PROT SERPL-MCNC: 7 G/DL (ref 6.4–8.4)
PTH-INTACT SERPL-MCNC: 117.2 PG/ML (ref 12–88)
RBC # BLD AUTO: 3.92 MILLION/UL (ref 3.81–5.12)
SODIUM SERPL-SCNC: 137 MMOL/L (ref 135–147)
TRIGL SERPL-MCNC: 100 MG/DL
TSH SERPL DL<=0.05 MIU/L-ACNC: 5.08 UIU/ML (ref 0.45–4.5)
WBC # BLD AUTO: 11.06 THOUSAND/UL (ref 4.31–10.16)

## 2023-07-10 PROCEDURE — 85025 COMPLETE CBC W/AUTO DIFF WBC: CPT

## 2023-07-10 PROCEDURE — 83970 ASSAY OF PARATHORMONE: CPT

## 2023-07-10 PROCEDURE — 82306 VITAMIN D 25 HYDROXY: CPT

## 2023-07-10 PROCEDURE — 80053 COMPREHEN METABOLIC PANEL: CPT

## 2023-07-10 PROCEDURE — 80061 LIPID PANEL: CPT

## 2023-07-10 PROCEDURE — 84443 ASSAY THYROID STIM HORMONE: CPT

## 2023-07-10 PROCEDURE — 36415 COLL VENOUS BLD VENIPUNCTURE: CPT

## 2023-07-11 ENCOUNTER — TELEPHONE (OUTPATIENT)
Dept: FAMILY MEDICINE CLINIC | Facility: CLINIC | Age: 58
End: 2023-07-11

## 2023-07-11 DIAGNOSIS — K91.2 POSTGASTRECTOMY MALABSORPTION: ICD-10-CM

## 2023-07-11 DIAGNOSIS — E21.3 HYPERPARATHYROIDISM (HCC): Primary | Chronic | ICD-10-CM

## 2023-07-11 DIAGNOSIS — E03.9 HYPOTHYROIDISM, UNSPECIFIED TYPE: ICD-10-CM

## 2023-07-11 DIAGNOSIS — Z90.3 POSTGASTRECTOMY MALABSORPTION: ICD-10-CM

## 2023-07-11 NOTE — TELEPHONE ENCOUNTER
Please contact patient. I sent her [employer] message regarding recent blood work results. Please kindly ask you to review. I placed blood work orders for patient, labs should be drawn few days prior to her next office visit.     Thank you

## 2023-07-14 ENCOUNTER — ANESTHESIA (OUTPATIENT)
Dept: PERIOP | Facility: HOSPITAL | Age: 58
End: 2023-07-14
Payer: COMMERCIAL

## 2023-07-14 ENCOUNTER — APPOINTMENT (OUTPATIENT)
Dept: RADIOLOGY | Facility: HOSPITAL | Age: 58
End: 2023-07-14
Payer: COMMERCIAL

## 2023-07-14 ENCOUNTER — TELEPHONE (OUTPATIENT)
Dept: PODIATRY | Facility: CLINIC | Age: 58
End: 2023-07-14

## 2023-07-14 ENCOUNTER — HOSPITAL ENCOUNTER (OUTPATIENT)
Facility: HOSPITAL | Age: 58
Setting detail: OUTPATIENT SURGERY
Discharge: HOME | End: 2023-07-14
Attending: PODIATRIST | Admitting: PODIATRIST
Payer: COMMERCIAL

## 2023-07-14 ENCOUNTER — ANESTHESIA EVENT (OUTPATIENT)
Dept: PERIOP | Facility: HOSPITAL | Age: 58
End: 2023-07-14
Payer: COMMERCIAL

## 2023-07-14 VITALS
OXYGEN SATURATION: 97 % | WEIGHT: 178 LBS | HEART RATE: 56 BPM | RESPIRATION RATE: 19 BRPM | BODY MASS INDEX: 31.54 KG/M2 | HEIGHT: 63 IN | TEMPERATURE: 97.2 F | SYSTOLIC BLOOD PRESSURE: 144 MMHG | DIASTOLIC BLOOD PRESSURE: 70 MMHG

## 2023-07-14 DIAGNOSIS — M20.21 HALLUX RIGIDUS, RIGHT FOOT: Primary | ICD-10-CM

## 2023-07-14 DIAGNOSIS — G89.18 POST-OPERATIVE PAIN: ICD-10-CM

## 2023-07-14 DIAGNOSIS — Z98.890 POST-OPERATIVE STATE: Primary | ICD-10-CM

## 2023-07-14 PROCEDURE — C1776 JOINT DEVICE (IMPLANTABLE): HCPCS | Performed by: PODIATRIST

## 2023-07-14 PROCEDURE — 20680 REMOVAL OF IMPLANT DEEP: CPT | Performed by: PODIATRIST

## 2023-07-14 PROCEDURE — 73630 X-RAY EXAM OF FOOT: CPT

## 2023-07-14 PROCEDURE — 28291 CORRJ HALUX RIGDUS W/IMPLT: CPT | Performed by: PODIATRIST

## 2023-07-14 PROCEDURE — C9290 INJ, BUPIVACAINE LIPOSOME: HCPCS | Performed by: PODIATRIST

## 2023-07-14 PROCEDURE — 99024 POSTOP FOLLOW-UP VISIT: CPT | Performed by: PODIATRIST

## 2023-07-14 DEVICE — IMPLANTABLE DEVICE
Type: IMPLANTABLE DEVICE | Site: FOOT | Status: FUNCTIONAL
Brand: SWANSON

## 2023-07-14 RX ORDER — ONDANSETRON 2 MG/ML
4 INJECTION INTRAMUSCULAR; INTRAVENOUS ONCE AS NEEDED
Status: DISCONTINUED | OUTPATIENT
Start: 2023-07-14 | End: 2023-07-14 | Stop reason: HOSPADM

## 2023-07-14 RX ORDER — FENTANYL CITRATE/PF 50 MCG/ML
25 SYRINGE (ML) INJECTION
Status: DISCONTINUED | OUTPATIENT
Start: 2023-07-14 | End: 2023-07-14 | Stop reason: HOSPADM

## 2023-07-14 RX ORDER — CLINDAMYCIN PHOSPHATE 900 MG/50ML
900 INJECTION INTRAVENOUS ONCE
Status: COMPLETED | OUTPATIENT
Start: 2023-07-14 | End: 2023-07-14

## 2023-07-14 RX ORDER — LIDOCAINE HYDROCHLORIDE 10 MG/ML
INJECTION, SOLUTION EPIDURAL; INFILTRATION; INTRACAUDAL; PERINEURAL AS NEEDED
Status: DISCONTINUED | OUTPATIENT
Start: 2023-07-14 | End: 2023-07-14

## 2023-07-14 RX ORDER — CHLORHEXIDINE GLUCONATE 0.12 MG/ML
15 RINSE ORAL ONCE
Status: DISCONTINUED | OUTPATIENT
Start: 2023-07-14 | End: 2023-07-14 | Stop reason: HOSPADM

## 2023-07-14 RX ORDER — CHLORHEXIDINE GLUCONATE 4 G/100ML
SOLUTION TOPICAL DAILY PRN
Status: DISCONTINUED | OUTPATIENT
Start: 2023-07-14 | End: 2023-07-14 | Stop reason: HOSPADM

## 2023-07-14 RX ORDER — FENTANYL CITRATE 50 UG/ML
INJECTION, SOLUTION INTRAMUSCULAR; INTRAVENOUS AS NEEDED
Status: DISCONTINUED | OUTPATIENT
Start: 2023-07-14 | End: 2023-07-14

## 2023-07-14 RX ORDER — DOXYCYCLINE 100 MG/1
100 TABLET ORAL 2 TIMES DAILY
Qty: 14 TABLET | Refills: 0 | Status: SHIPPED | OUTPATIENT
Start: 2023-07-14 | End: 2023-07-21

## 2023-07-14 RX ORDER — SODIUM CHLORIDE, SODIUM LACTATE, POTASSIUM CHLORIDE, CALCIUM CHLORIDE 600; 310; 30; 20 MG/100ML; MG/100ML; MG/100ML; MG/100ML
INJECTION, SOLUTION INTRAVENOUS CONTINUOUS PRN
Status: DISCONTINUED | OUTPATIENT
Start: 2023-07-14 | End: 2023-07-14

## 2023-07-14 RX ORDER — MIDAZOLAM HYDROCHLORIDE 2 MG/2ML
INJECTION, SOLUTION INTRAMUSCULAR; INTRAVENOUS AS NEEDED
Status: DISCONTINUED | OUTPATIENT
Start: 2023-07-14 | End: 2023-07-14

## 2023-07-14 RX ORDER — PROPOFOL 10 MG/ML
INJECTION, EMULSION INTRAVENOUS AS NEEDED
Status: DISCONTINUED | OUTPATIENT
Start: 2023-07-14 | End: 2023-07-14

## 2023-07-14 RX ORDER — OXYCODONE HYDROCHLORIDE AND ACETAMINOPHEN 5; 325 MG/1; MG/1
1 TABLET ORAL EVERY 4 HOURS PRN
Qty: 30 TABLET | Refills: 0 | Status: SHIPPED | OUTPATIENT
Start: 2023-07-14 | End: 2023-07-19

## 2023-07-14 RX ORDER — HYDROMORPHONE HCL IN WATER/PF 6 MG/30 ML
0.2 PATIENT CONTROLLED ANALGESIA SYRINGE INTRAVENOUS
Status: DISCONTINUED | OUTPATIENT
Start: 2023-07-14 | End: 2023-07-14 | Stop reason: HOSPADM

## 2023-07-14 RX ORDER — PROPOFOL 10 MG/ML
INJECTION, EMULSION INTRAVENOUS CONTINUOUS PRN
Status: DISCONTINUED | OUTPATIENT
Start: 2023-07-14 | End: 2023-07-14

## 2023-07-14 RX ADMIN — CLINDAMYCIN IN 5 PERCENT DEXTROSE 900 MG: 18 INJECTION, SOLUTION INTRAVENOUS at 12:03

## 2023-07-14 RX ADMIN — MIDAZOLAM 2 MG: 1 INJECTION INTRAMUSCULAR; INTRAVENOUS at 12:06

## 2023-07-14 RX ADMIN — FENTANYL CITRATE 50 MCG: 50 INJECTION, SOLUTION INTRAMUSCULAR; INTRAVENOUS at 13:45

## 2023-07-14 RX ADMIN — SODIUM CHLORIDE, SODIUM LACTATE, POTASSIUM CHLORIDE, AND CALCIUM CHLORIDE: .6; .31; .03; .02 INJECTION, SOLUTION INTRAVENOUS at 11:43

## 2023-07-14 RX ADMIN — FENTANYL CITRATE 50 MCG: 50 INJECTION, SOLUTION INTRAMUSCULAR; INTRAVENOUS at 12:14

## 2023-07-14 RX ADMIN — PROPOFOL 120 MCG/KG/MIN: 10 INJECTION, EMULSION INTRAVENOUS at 12:08

## 2023-07-14 RX ADMIN — PROPOFOL 20 MG: 10 INJECTION, EMULSION INTRAVENOUS at 12:08

## 2023-07-14 RX ADMIN — LIDOCAINE HYDROCHLORIDE 20 MG: 10 INJECTION, SOLUTION EPIDURAL; INFILTRATION; INTRACAUDAL; PERINEURAL at 12:08

## 2023-07-14 NOTE — OP NOTE
OPERATIVE REPORT - Podiatry  PATIENT NAME: José Miguel Perez    :  1965  MRN: [id number]  Pt Location:  OR ROOM 02    SURGERY DATE: 2023    Surgeon(s) and Role:     * Alberto Lynn DPM - Primary     * Fang Moreno DPM - Assisting    Pre-op Diagnosis:  Hallux rigidus of right foot [M20.21]  Retained orthopedic hardware [Z96.9]    Post-Op Diagnosis Codes:     * Hallux rigidus of right foot [M20.21]     * Retained orthopedic hardware [Z96.9]    Procedure(s) (LRB):  CHEILECTOMY WITH TOTAL JOINT REPLACEMENT RIGHT 1ST MPJ (Right)  REMOVAL HARDWARE FOOT RIGHT FOOT (Right)    Specimen(s):  * No specimens in log *    Estimated Blood Loss:   Minimal    Drains:  * No LDAs found *    Anesthesia Type:   Choice with 16 ml of 1% Lidocaine and 0.5% Bupivacaine in a 1:1 mixture and 20 ml of Exparel 0.5% solution. Hemostasis:  Ankle Tourniquet     Materials:  Implant Name Type Inv. Item Serial No.  Lot No. LRB No. Used Action   ALEXANDER FLEXIBLE TOE IMPLANTS W/ GROMMETS 4S - [id number]  ALEXANDER FLEXIBLE TOE IMPLANTS W/ GROMMETS 4S  JACOME D [facility] [id number] Right 1 Implanted     3-0 Vicryl  4-0 Vicryl  4-0 Monocryl     Operative Findings:  1. Normal Healthy Bleeding Tissue  2. Quality Bone Stock  3. Significant destruction of 1st MTP joint    Complications:   None    Procedure and Technique:     Under mild sedation, the patient was brought into the operating room and placed on the operating room table in the supine position. IV sedation was achieved by anesthesia team and a universal timeout was performed where all parties are in agreement of correct patient, correct procedure and correct site. A pneumatic tourniquet was then placed over the patient's right lower extremity with ample padding. A griffith block was performed consisting of 16 ml of 1% Lidocaine and 0.5% Bupivacaine in a 1:1 mixture. The foot was then prepped and draped in the usual aseptic manner.  An esmarch bandage was used to [employer] the foot and the pneumatic tourniquet was then inflated to 250 mmHg. Attention was then directed to the dorsomedial aspect of the first ray, where a linear longitudinal incision was made. The incision was deepened through the subcutaneous tissues using sharp and blunt dissection. Care was taken to identify and retract all vital neurovascular structures. All bleeders were ligated and cauterized as necessary. At this time, a linear longitudinal capsular and periosteal incision was made at the first metatarsophalangeal joint. The capsule was reflected from the medial, dorsal, and lateral aspect of the first metatarsal head. At this time, the hardware from a prior procedure was located and was removed intact using a sagittal saw, forceps, and dental pick to remove bone and soft tissue overlying the screw heads. A hex screwdriver was this used to remove the two screws. The joint was then inspected for any cartilaginous defects, which were present and severe reduction in joint cartilage was noted with spurring noted at the dorsal, medial, and lateral aspects of the joint. At this time, a sagittal saw was utilized to resect the head of the first metatarsal head and base of the first proximal phalanx with care taken to ensure proper alignment. Utilizing a combination of sagittal saw, rongeur, and haylie all prominent osteophytes were removed from the dorsal, medial, lateral and plantar surfaces of the joint. The wound was then flushed with copious amounts of sterile saline. At this time, the bisection of the right first metatarsal and first proximal phalanx was located and using a sharp tamp to open the medullary canal. Using progressively larger sizers and a mallet starting at 1 and ending at size 4, the medullary canal was opened to created adequate space for the stems of the total Lo implant. Metal grommets were placed in the medullary canal of the first metatarsal and proximal phalanx base and tamped into place. The total Lo implant was inserted into the joint and tested for alignment and ROM. The wound was then flushed with copious amounts of sterile saline. Capsular closure was then obtained utilizing 3-0 Vicryl. Deep fascia tissue was closed utilizing 4-0 Vicryl. Subcutaneous and skin closure was obtained utilizing 4-0 monocryl placed in a running type of fashion with steri strips to secure the incision site. The tourniquet was deflated at approximately 76 min and normal hyperemic response was noted to all digits. The patient tolerated the procedure and anesthesia well without immediate complications and transferred to PACU with vital signs stable. As with many limb salvage procedures, we contemplate the possibility of performing further stages to this procedure. Procedures may include debridements, delayed closure, plastic surgery techniques, or more proximal amputations. This procedure may be considered part of a multi-staged limb salvage treatment plan. Dr. Sandro Tripp was present during the entire procedure and participated in all key aspects. SIGNATURE: Ryan Garcia DPM  DATE: July 14, 2023  TIME: 1:54 PM      Portions of the record may have been created with voice recognition software. Occasional wrong word or "sound a like" substitutions may have occurred due to the inherent limitations of voice recognition software. Read the chart carefully and recognize, using context, where substitutions have occurred.

## 2023-07-14 NOTE — DISCHARGE INSTR - AVS FIRST PAGE
Discharge Summary Outpatient Procedure Podiatry -   Mariaelena Macias 63 y.o. female MRN: [id number]  Unit/Bed#: OR POOL Encounter: [id number]    Admission Date: 7/14/2023     Admitting Diagnosis: Hallux rigidus of right foot [M20.21]  Retained orthopedic hardware [Z96.9]    Discharge Diagnosis: same    Procedures Performed: CHEILECTOMY WITH TOTAL JOINT REPLACEMENT RIGHT 1ST MPJ: 63790 (CPT®)  REMOVAL HARDWARE FOOT RIGHT FOOT: 05254 (CPT®)    Complications: none    Condition at Discharge: stable    Discharge instructions/Information to patient and family:   See after visit summary for information provided to patient and family. Provisions for Follow-Up Care/Important appointments:  See after visit summary for information related to follow-up care and any pertinent home health orders. Discharge Medications:  See after visit summary for reconciled discharge medications provided to patient and family. Dr. Constance Skiff Instructions    1. Take your prescribed medication as directed. 2. Upon arrival at home, lie down and elevate your surgical foot on 2 pillows. 3. Stay off your feet as much as possible for the first 24-48 hours. This is when your feet first swell and may become painful. After 48 hours you may begin limited walking following these restrictions:      Weight-bearing as tolerated to the right heel in surgical shoe with assistance of a walker     4. Drink large quantities of water. Consume no alcohol. Continue a well-balanced diet. 5. Report any unusual discomfort or fever to this office. 6. A limited amount of discomfort and swelling is to be expected. In some cases the skin may take on a bruised appearance. The surgical cleansing solution that was applied to your foot prior to the operation is dark in color and the operation site may appear to be oozing when it actually is not. 7. A slight amount of blood is to be expected, and is no cause for alarm.  Do not remove the dressings. If there is active bleeding and if the bleeding persists, add additional gauze to the bandage, apply direct pressure, elevate your feet and call this office. 8. Do not get the dressings wet. As regular bathing may be inconvenient, sponge baths are recommended. 9. When anesthesia wears off and if any discomfort should be present, apply an ice pack directly over the operated area for 15 minute intervals for several hours or until the pain leaves. (USE IN EXCESS OF 15 MINUTES COULD CAUSE FROSTBITE). Do not use hot water bags or electric pads. A convenient icepack can be made by placing ice cubes in a plastic bag and covering this with a towel. 10. Take over-the-counter laxative for constipation, this is common with use of narcotic medications.

## 2023-07-14 NOTE — ANESTHESIA POSTPROCEDURE EVALUATION
Post-Op Assessment Note    CV Status:  Stable  Pain Score: 0    Pain management: adequate     Mental Status:  Alert and awake   Hydration Status:  Euvolemic   PONV Controlled:  Controlled   Airway Patency:  Patent      Post Op Vitals Reviewed: Yes      Staff: CRNA         No notable events documented.     BP   138/72   Temp   97.2   Pulse  59   Resp   16   SpO2   100%

## 2023-07-14 NOTE — DISCHARGE SUMMARY
Discharge Summary Outpatient Procedure Podiatry -   Saniya Escobar 63 y.o. female MRN: [id number]  Unit/Bed#: OR POOL Encounter: [id number]    Admission Date: 7/14/2023     Admitting Diagnosis: Hallux rigidus of right foot [M20.21]  Retained orthopedic hardware [Z96.9]    Discharge Diagnosis: same    Procedures Performed: CHEILECTOMY WITH TOTAL JOINT REPLACEMENT RIGHT 1ST MPJ: 25945 (CPT®)  REMOVAL HARDWARE FOOT RIGHT FOOT: 19347 (CPT®)    Complications: none    Condition at Discharge: stable    Discharge instructions/Information to patient and family:   See after visit summary for information provided to patient and family. Provisions for Follow-Up Care/Important appointments:  See after visit summary for information related to follow-up care and any pertinent home health orders. Discharge Medications:  See after visit summary for reconciled discharge medications provided to patient and family.

## 2023-07-14 NOTE — TELEPHONE ENCOUNTER
Caller: ofelia brownlee    Doctor: Zana Santana    Reason for call: Needs to change antibiotic. Patient is allergic to antibiotic.     Call back#: [phone]

## 2023-07-14 NOTE — ANESTHESIA PREPROCEDURE EVALUATION
[phone]       Whose call is being returned: MD Kayla Swanson       Details to clarify the request: States received message from MD Kayla Swanson and was returning call. Not sure what the call was about per pt but wanted to call back.            DANIEL Procedure:  CHEILECTOMY WITH TOTAL JOINT REPLACEMENT RIGHT 1ST MPJ (Right: Foot)  REMOVAL HARDWARE FOOT RIGHT FOOT (Right: Foot)    Relevant Problems   ANESTHESIA  previously woke up from anesthesia combative      CARDIO   (+) Essential hypertension      ENDO   (+) Hyperparathyroidism (HCC)   (+) Hypothyroidism      GI/HEPATIC   (+) Chronic GERD      HEMATOLOGY   (+) Anemia   (+) Iron deficiency anemia, unspecified      MUSCULOSKELETAL   (+) Psoriasis with arthropathy (HCC)      NEURO/PSYCH   (+) Depression with anxiety   (+) Generalized anxiety disorder      ADHD  H/o gastric bypass  Reactive airway disease  sjogren's syndrome      Physical Exam    Airway    Mallampati score: I  TM Distance: >3 FB  Neck ROM: full     Dental   Comment: None loose,     Cardiovascular      Pulmonary      Other Findings           Latest Reference Range & Units 07/10/23 16:23   Sodium 135 - 147 mmol/L 137   Potassium 3.5 - 5.3 mmol/L 4.3   Chloride 96 - 108 mmol/L 99   CO2 21 - 32 mmol/L 30   Anion Gap mmol/L 8   BUN 5 - 25 mg/dL 21   Creatinine 0.60 - 1.30 mg/dL 0.99   GLUCOSE FASTING 65 - 99 mg/dL 138 (H)   Calcium 8.4 - 10.2 mg/dL 8.9   AST 13 - 39 U/L 26   ALT 7 - 52 U/L 33   Alkaline Phosphatase 34 - 104 U/L 85   Total Protein 6.4 - 8.4 g/dL 7.0   Albumin 3.5 - 5.0 g/dL 4.1   TOTAL BILIRUBIN 0.20 - 1.00 mg/dL 0.49   eGFR ml/min/1.73sq m 63   (H): Data is abnormally high       Latest Reference Range & Units 07/10/23 16:23   WBC 4.31 - 10.16 Thousand/uL 11.06 (H)   Red Blood Cell Count 3.81 - 5.12 Million/uL 3.92   Hemoglobin 11.5 - 15.4 g/dL 12.2   HCT 34.8 - 46.1 % 38.2   MCV 82 - 98 fL 97   MCH 26.8 - 34.3 pg 31.1   MCHC 31.4 - 37.4 g/dL 31.9   RDW 11.6 - 15.1 % 12.5   Platelet Count 556 - 390 Thousands/uL 342   MPV 8.9 - 12.7 fL 9.1   nRBC /100 WBCs 0   (H): Data is abnormally high        Anesthesia Plan  ASA Score- 2     Anesthesia Type- IV sedation with anesthesia with ASA Monitors.          Additional Monitors:   Airway Plan: Comment: Water with meds at 09:00, otherwise NPO after MN    Patient educated on the possibility for awareness under sedation and of the possibility of airway intervention in the event of an airway or procedural emergency  . Plan Factors-Exercise tolerance (METS): >4 METS. Chart reviewed. Patient summary reviewed. Patient is not a current smoker. Induction- intravenous. Postoperative Plan-     Informed Consent- Anesthetic plan and risks discussed with patient. I personally reviewed this patient with the CRNA. Discussed and agreed on the Anesthesia Plan with the CRNA. Naldo Sandoval

## 2023-07-14 NOTE — PROGRESS NOTES
Patient seen in preop.      Vitals reviewed - there has been no change in patient's medical history    Patient is suitable for indicated surgical procedure

## 2023-07-17 DIAGNOSIS — Z98.890 POST-OPERATIVE STATE: ICD-10-CM

## 2023-07-17 DIAGNOSIS — M20.21 HALLUX RIGIDUS, RIGHT FOOT: Primary | ICD-10-CM

## 2023-07-17 RX ORDER — CLINDAMYCIN HYDROCHLORIDE 300 MG/1
300 CAPSULE ORAL 3 TIMES DAILY
Qty: 21 CAPSULE | Refills: 0 | Status: SHIPPED | OUTPATIENT
Start: 2023-07-17 | End: 2023-07-24

## 2023-07-25 ENCOUNTER — OFFICE VISIT (OUTPATIENT)
Dept: PODIATRY | Facility: CLINIC | Age: 58
End: 2023-07-25

## 2023-07-25 VITALS
DIASTOLIC BLOOD PRESSURE: 77 MMHG | HEART RATE: 64 BPM | HEIGHT: 64 IN | BODY MASS INDEX: 30.39 KG/M2 | WEIGHT: 178 LBS | SYSTOLIC BLOOD PRESSURE: 131 MMHG

## 2023-07-25 DIAGNOSIS — M20.21 HALLUX RIGIDUS, RIGHT FOOT: Primary | ICD-10-CM

## 2023-07-25 PROCEDURE — 99024 POSTOP FOLLOW-UP VISIT: CPT | Performed by: PODIATRIST

## 2023-07-25 NOTE — PROGRESS NOTES
PATIENT:  Jhony Han      1965    ASSESSMENT     1. Hallux rigidus, right foot               PLAN  Patient is doing well post-operatively. Sutures left intact. Incision was cleaned with betadine and DSD applied to be kept C/D/I. Continue post-op care as instructed. Stressed on patient compliance about proper off-loading, staying off of feet, and proper dressing care. Instructed her to wear surgical shoe at all times. Call if any increase in pain, fevers, calf pain, shortness of breath, or general distress is noted. Patient instructed to go to ER if call is not returned immediately. HISTORY OF PRESENT ILLNESS  Patient presents for post-op appointment. Post-op pain is minimal.  The patient is feeling well and in good spirits. Patient reported no post-op concern. She presents with sneakers today. She changed the dressing last week. REVIEW OF SYSTEMS  GENERAL: No fever or chills. HEART: No chest pain, or palpitation  RESPIRATORY:  No SOB or cough  GI: No Nausea, vomit or diarrhea  NEUROLOGIC: No syncope or acute weakness  MUSCULOSKELETAL: No calf pain or edema. PHYSICAL EXAMINATION    /77   Pulse 64   Ht 5' 4" (1.626 m)   Wt 80.7 kg (178 lb)   LMP  (LMP Unknown)   BMI 30.55 kg/m²     GENERAL  The patient appears in NAD / non-toxic. Afebrile. VSS    VASCULAR EXAM  Pedal pulses and vascular status are intact. No calf pain or edema bilaterally. No cyanosis. DERMATOLOGIC EXAM  Incision is coapted and healing well. There is no steri strips. She applied clear tapes over the incision. No signs of infection. No drainage. Normal post-op edema and ecchymosis. No necrosis or dehiscence. NEUROLOGIC EXAM  AAO X 3. No focal neurologic deficit. Neurologic status is intact BLE. MUSCULOSKELETAL EXAM  Good surgical correction. Normal post-op findings. ROM intact. No fluctuation or crepitus.

## 2023-08-01 ENCOUNTER — OFFICE VISIT (OUTPATIENT)
Dept: PODIATRY | Facility: CLINIC | Age: 58
End: 2023-08-01

## 2023-08-01 VITALS — WEIGHT: 177.91 LBS | HEIGHT: 64 IN | BODY MASS INDEX: 30.37 KG/M2

## 2023-08-01 DIAGNOSIS — M20.21 HALLUX RIGIDUS, RIGHT FOOT: Primary | ICD-10-CM

## 2023-08-01 PROCEDURE — 99024 POSTOP FOLLOW-UP VISIT: CPT | Performed by: PODIATRIST

## 2023-08-01 NOTE — PROGRESS NOTES
PATIENT:  Zahira Mondragon      1965    ASSESSMENT     1. Hallux rigidus, right foot               PLAN  Patient is doing well post-operatively. Sutures removed and steri strips applied. Do not remove steri strips. Instructed skin care. Continue post-op care as instructed. Stressed on patient compliance about proper off-loading/staying off of feet. .  Instructed her to wear surgical shoe at all times. Call if any increase in pain, fevers, calf pain, shortness of breath, or general distress is noted. Patient instructed to go to ER if call is not returned immediately. RA in 3 weeks. HISTORY OF PRESENT ILLNESS  Patient presents for post-op appointment. Post-op pain is minimal.  Patient reported no post-op concern. Her dressing got wet and she removed it. She peeled off steri strips. REVIEW OF SYSTEMS  GENERAL: No fever or chills. HEART: No chest pain, or palpitation  RESPIRATORY:  No SOB or cough  GI: No Nausea, vomit or diarrhea  NEUROLOGIC: No syncope or acute weakness  MUSCULOSKELETAL: No calf pain or edema. PHYSICAL EXAMINATION    Ht 5' 4" (1.626 m)   Wt 80.7 kg (177 lb 14.6 oz)   LMP  (LMP Unknown)   BMI 30.54 kg/m²     GENERAL  The patient appears in NAD / non-toxic. Afebrile. VSS    VASCULAR EXAM  Pedal pulses and vascular status are intact. No calf pain or edema bilaterally. No cyanosis. DERMATOLOGIC EXAM  Incision is coapted and healed. There is no steri strips. No signs of infection. No drainage. Decreased post-op edema and ecchymosis. No necrosis or dehiscence. NEUROLOGIC EXAM  AAO X 3. No focal neurologic deficit. Neurologic status is intact BLE. MUSCULOSKELETAL EXAM  Good surgical correction. Normal post-op findings. ROM intact. No fluctuation or crepitus.

## 2023-08-22 ENCOUNTER — OFFICE VISIT (OUTPATIENT)
Dept: PODIATRY | Facility: CLINIC | Age: 58
End: 2023-08-22

## 2023-08-22 VITALS
HEIGHT: 64 IN | WEIGHT: 177 LBS | DIASTOLIC BLOOD PRESSURE: 79 MMHG | SYSTOLIC BLOOD PRESSURE: 128 MMHG | BODY MASS INDEX: 30.22 KG/M2 | HEART RATE: 71 BPM

## 2023-08-22 DIAGNOSIS — M20.21 HALLUX RIGIDUS, RIGHT FOOT: Primary | ICD-10-CM

## 2023-08-22 PROCEDURE — 99024 POSTOP FOLLOW-UP VISIT: CPT | Performed by: PODIATRIST

## 2023-08-22 NOTE — PROGRESS NOTES
PATIENT:  Dario New      1965    ASSESSMENT     1. Hallux rigidus, right foot               PLAN  Patient is doing well post-operatively. Instructed skin care. Okay to slowly advance shoes as tolerated with limited walking. Continue post-op care as instructed. Call if any increase in pain, fevers, calf pain, shortness of breath, or general distress is noted. Patient instructed to go to ER if call is not returned immediately. RA in 3 weeks. HISTORY OF PRESENT ILLNESS  Patient presents for post-op appointment. No pain. Patient reported no post-op concern. He is very happy with the outcome. REVIEW OF SYSTEMS  GENERAL: No fever or chills. HEART: No chest pain, or palpitation  RESPIRATORY:  No SOB or cough  GI: No Nausea, vomit or diarrhea  NEUROLOGIC: No syncope or acute weakness  MUSCULOSKELETAL: No calf pain or edema. PHYSICAL EXAMINATION    /79   Pulse 71   Ht 5' 4" (1.626 m)   Wt 80.3 kg (177 lb)   LMP  (LMP Unknown)   BMI 30.38 kg/m²     GENERAL  The patient appears in NAD / non-toxic. Afebrile. VSS    VASCULAR EXAM  Pedal pulses and vascular status are intact. No calf pain or edema bilaterally. No cyanosis. DERMATOLOGIC EXAM  No wound. No signs of infection. No drainage. Decreased post-op edema and ecchymosis. No necrosis or dehiscence. NEUROLOGIC EXAM  AAO X 3. No focal neurologic deficit. Neurologic status is intact BLE. MUSCULOSKELETAL EXAM  Good surgical correction. Normal post-op findings. ROM intact. No fluctuation or crepitus.

## 2023-09-15 ENCOUNTER — RA CDI HCC (OUTPATIENT)
Dept: OTHER | Facility: HOSPITAL | Age: 58
End: 2023-09-15

## 2023-09-15 NOTE — PROGRESS NOTES
[facility] coding opportunities       Chart reviewed, no opportunity found: CHART REVIEWED, NO OPPORTUNITY FOUND        Patients Insurance        Commercial Insurance: [place]

## 2023-09-29 DIAGNOSIS — E03.9 HYPOTHYROIDISM, UNSPECIFIED TYPE: ICD-10-CM

## 2023-09-29 RX ORDER — LEVOTHYROXINE SODIUM 0.1 MG/1
100 TABLET ORAL DAILY
Qty: 90 TABLET | Refills: 0 | Status: SHIPPED | OUTPATIENT
Start: 2023-09-29

## 2023-10-03 ENCOUNTER — OFFICE VISIT (OUTPATIENT)
Dept: PODIATRY | Facility: CLINIC | Age: 58
End: 2023-10-03

## 2023-10-03 VITALS
DIASTOLIC BLOOD PRESSURE: 82 MMHG | BODY MASS INDEX: 30.22 KG/M2 | WEIGHT: 177 LBS | HEIGHT: 64 IN | SYSTOLIC BLOOD PRESSURE: 131 MMHG | HEART RATE: 69 BPM

## 2023-10-03 DIAGNOSIS — M79.2 NEURALGIA OF RIGHT FOOT: Primary | ICD-10-CM

## 2023-10-03 DIAGNOSIS — M20.21 HALLUX RIGIDUS, RIGHT FOOT: ICD-10-CM

## 2023-10-03 DIAGNOSIS — E55.9 VITAMIN D DEFICIENCY: ICD-10-CM

## 2023-10-03 PROCEDURE — 99024 POSTOP FOLLOW-UP VISIT: CPT | Performed by: PODIATRIST

## 2023-10-03 RX ORDER — ERGOCALCIFEROL 1.25 MG/1
50000 CAPSULE ORAL
Qty: 6 CAPSULE | Refills: 2 | Status: SHIPPED | OUTPATIENT
Start: 2023-10-03

## 2023-10-03 NOTE — PROGRESS NOTES
PATIENT:  Arch Simmonds      1965    ASSESSMENT     1. Neuralgia of right foot  Ambulatory referral to Physical Therapy      2. Hallux rigidus, right foot  Ambulatory referral to Physical Therapy             PLAN  Patient is doing well post-operatively. She has some neurologic symptom after being on her feet for a while. Could be post-op neuralgia vs sciatica. Referred her to PT. Pt to call the office in a few weeks if her condition does not improve. HISTORY OF PRESENT ILLNESS  Patient presents for post-op appointment. No pain. Tolerates regular shoes well. She is very happy with the outcome. She has been experiencing some shooting sensation RLE after being on her feet for a while. Denied any new injury. REVIEW OF SYSTEMS  GENERAL: No fever or chills. HEART: No chest pain, or palpitation  RESPIRATORY:  No SOB or cough  GI: No Nausea, vomit or diarrhea  NEUROLOGIC: No syncope or acute weakness  MUSCULOSKELETAL: No calf pain or edema. PHYSICAL EXAMINATION    /82   Pulse 69   Ht 5' 4" (1.626 m)   Wt 80.3 kg (177 lb)   LMP  (LMP Unknown)   BMI 30.38 kg/m²     GENERAL  The patient appears in NAD / non-toxic. Afebrile. VSS    VASCULAR EXAM  Pedal pulses and vascular status are intact. No calf pain or edema bilaterally. No cyanosis. DERMATOLOGIC EXAM  No wound. No signs of infection. No drainage. No necrosis or dehiscence. NEUROLOGIC EXAM  AAO X 3. No focal neurologic deficit. Neurologic status is intact BLE. MUSCULOSKELETAL EXAM  Good surgical correction. Normal post-op findings. ROM intact. No fluctuation or crepitus. No reproducible pain or symptoms with palpation or ROM.

## 2023-10-14 ENCOUNTER — APPOINTMENT (EMERGENCY)
Dept: RADIOLOGY | Facility: HOSPITAL | Age: 58
End: 2023-10-14
Payer: COMMERCIAL

## 2023-10-14 ENCOUNTER — HOSPITAL ENCOUNTER (EMERGENCY)
Facility: HOSPITAL | Age: 58
Discharge: HOME | End: 2023-10-14
Attending: EMERGENCY MEDICINE
Payer: COMMERCIAL

## 2023-10-14 VITALS
DIASTOLIC BLOOD PRESSURE: 74 MMHG | RESPIRATION RATE: 18 BRPM | TEMPERATURE: 98.6 F | HEART RATE: 73 BPM | OXYGEN SATURATION: 95 % | SYSTOLIC BLOOD PRESSURE: 143 MMHG

## 2023-10-14 DIAGNOSIS — S22.20XA CLOSED FRACTURE OF STERNUM: Primary | ICD-10-CM
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
## 2023-10-14 PROCEDURE — 99285 EMERGENCY DEPT VISIT HI MDM: CPT | Performed by: EMERGENCY MEDICINE

## 2023-10-14 PROCEDURE — 70450 CT HEAD/BRAIN W/O DYE: CPT

## 2023-10-14 PROCEDURE — 99284 EMERGENCY DEPT VISIT MOD MDM: CPT

## 2023-10-14 PROCEDURE — G1004 CDSM NDSC: HCPCS

## 2023-10-14 PROCEDURE — 93005 ELECTROCARDIOGRAM TRACING: CPT

## 2023-10-14 PROCEDURE — 71250 CT THORAX DX C-: CPT

## 2023-10-14 PROCEDURE — 72125 CT NECK SPINE W/O DYE: CPT

## 2023-10-14 RX ORDER — OXYCODONE HYDROCHLORIDE AND ACETAMINOPHEN 5; 325 MG/1; MG/1
1 TABLET ORAL EVERY 8 HOURS PRN
Qty: 21 TABLET | Refills: 0 | Status: SHIPPED | OUTPATIENT
Start: 2023-10-14 | End: 2023-10-21

## 2023-10-14 NOTE — ED NOTES
Pt visibly unable to sit still during assessment with resident. Pt has a flight of ideas at this time.      Erika Schwab, RN  10/14/23 2604

## 2023-10-15 NOTE — DISCHARGE INSTRUCTIONS
Please follow up with your PCP. Continue to monitor symptoms at home. Please return to the Emergency Department if you experience worsening of your current symptoms or any new/other concerning symptoms.
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
## 2023-10-16 LAB
ATRIAL RATE: 76 BPM
P AXIS: 50 DEGREES
PR INTERVAL: 152 MS
QRS AXIS: 11 DEGREES
QRSD INTERVAL: 72 MS
QT INTERVAL: 392 MS
QTC INTERVAL: 441 MS
T WAVE AXIS: 37 DEGREES
VENTRICULAR RATE: 76 BPM

## 2023-10-16 PROCEDURE — 93010 ELECTROCARDIOGRAM REPORT: CPT | Performed by: INTERNAL MEDICINE
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
## 2023-11-02 DIAGNOSIS — E78.2 MIXED HYPERLIPIDEMIA: ICD-10-CM

## 2023-11-02 DIAGNOSIS — M35.05 SJOGREN SYNDROME WITH INFLAMMATORY ARTHRITIS (HCC): ICD-10-CM

## 2023-11-02 DIAGNOSIS — K21.9 CHRONIC GERD: ICD-10-CM

## 2023-11-02 RX ORDER — PANTOPRAZOLE SODIUM 40 MG/1
TABLET, DELAYED RELEASE ORAL
Qty: 90 TABLET | Refills: 0 | Status: SHIPPED | OUTPATIENT
Start: 2023-11-02

## 2023-11-02 RX ORDER — ROSUVASTATIN CALCIUM 5 MG/1
5 TABLET, COATED ORAL DAILY
Qty: 30 TABLET | Refills: 5 | Status: SHIPPED | OUTPATIENT
Start: 2023-11-02

## 2023-11-06 DIAGNOSIS — M35.05 SJOGREN SYNDROME WITH INFLAMMATORY ARTHRITIS (HCC): ICD-10-CM

## 2023-11-06 RX ORDER — PILOCARPINE HYDROCHLORIDE 5 MG/1
5 TABLET, FILM COATED ORAL 3 TIMES DAILY
Qty: 90 TABLET | Refills: 3 | Status: SHIPPED | OUTPATIENT
Start: 2023-11-06 | End: 2023-11-06 | Stop reason: SDUPTHER

## 2023-11-06 RX ORDER — PILOCARPINE HYDROCHLORIDE 5 MG/1
5 TABLET, FILM COATED ORAL 3 TIMES DAILY
Qty: 90 TABLET | Refills: 3 | Status: SHIPPED | OUTPATIENT
Start: 2023-11-06

## 2023-11-22 DIAGNOSIS — K21.9 CHRONIC GERD: ICD-10-CM

## 2023-11-22 RX ORDER — PANTOPRAZOLE SODIUM 40 MG/1
TABLET, DELAYED RELEASE ORAL
Qty: 90 TABLET | Refills: 0 | Status: SHIPPED | OUTPATIENT
Start: 2023-11-22

## 2023-12-13 DIAGNOSIS — L40.50 PSORIASIS WITH ARTHROPATHY (HCC): ICD-10-CM

## 2023-12-13 RX ORDER — MELOXICAM 15 MG/1
TABLET ORAL
Qty: 90 TABLET | Refills: 1 | Status: SHIPPED | OUTPATIENT
Start: 2023-12-13

## 2024-01-10 DIAGNOSIS — E03.9 HYPOTHYROIDISM, UNSPECIFIED TYPE: ICD-10-CM

## 2024-01-10 RX ORDER — LEVOTHYROXINE SODIUM 0.1 MG/1
100 TABLET ORAL DAILY
Qty: 90 TABLET | Refills: 0 | Status: SHIPPED | OUTPATIENT
Start: 2024-01-10

## 2024-02-01 DIAGNOSIS — K21.9 CHRONIC GERD: ICD-10-CM

## 2024-02-01 RX ORDER — PANTOPRAZOLE SODIUM 40 MG/1
TABLET, DELAYED RELEASE ORAL
Qty: 90 TABLET | Refills: 0 | Status: SHIPPED | OUTPATIENT
Start: 2024-02-01

## 2024-02-15 DIAGNOSIS — J32.9 SINUSITIS, UNSPECIFIED CHRONICITY, UNSPECIFIED LOCATION: ICD-10-CM

## 2024-02-15 DIAGNOSIS — L40.50 PSORIASIS WITH ARTHROPATHY (HCC): Primary | Chronic | ICD-10-CM

## 2024-02-17 RX ORDER — MOMETASONE FUROATE 50 UG/1
2 SPRAY, METERED NASAL DAILY
Qty: 51 G | Refills: 0 | Status: SHIPPED | OUTPATIENT
Start: 2024-02-17

## 2024-02-17 RX ORDER — GABAPENTIN 100 MG/1
300 CAPSULE ORAL 2 TIMES DAILY
Qty: 180 CAPSULE | Refills: 5 | Status: SHIPPED | OUTPATIENT
Start: 2024-02-17

## 2024-02-21 PROBLEM — Z00.00 ENCOUNTER FOR WELLNESS EXAMINATION IN ADULT: Status: RESOLVED | Noted: 2022-08-24 | Resolved: 2024-02-21

## 2024-03-29 DIAGNOSIS — M35.05 SJOGREN SYNDROME WITH INFLAMMATORY ARTHRITIS (HCC): ICD-10-CM

## 2024-04-01 RX ORDER — PILOCARPINE HYDROCHLORIDE 5 MG/1
5 TABLET, FILM COATED ORAL 3 TIMES DAILY
Qty: 90 TABLET | Refills: 3 | Status: SHIPPED | OUTPATIENT
Start: 2024-04-01

## 2024-04-15 DIAGNOSIS — K21.9 CHRONIC GERD: ICD-10-CM

## 2024-04-15 DIAGNOSIS — E03.9 HYPOTHYROIDISM, UNSPECIFIED TYPE: ICD-10-CM

## 2024-04-15 RX ORDER — PANTOPRAZOLE SODIUM 40 MG/1
TABLET, DELAYED RELEASE ORAL
Qty: 90 TABLET | Refills: 0 | Status: SHIPPED | OUTPATIENT
Start: 2024-04-15

## 2024-04-15 RX ORDER — LEVOTHYROXINE SODIUM 0.1 MG/1
100 TABLET ORAL DAILY
Qty: 90 TABLET | Refills: 0 | Status: SHIPPED | OUTPATIENT
Start: 2024-04-15

## 2024-04-18 DIAGNOSIS — E55.9 VITAMIN D DEFICIENCY: ICD-10-CM

## 2024-04-24 RX ORDER — ERGOCALCIFEROL 1.25 MG/1
50000 CAPSULE ORAL
Qty: 6 CAPSULE | Refills: 0 | Status: SHIPPED | OUTPATIENT
Start: 2024-04-24

## 2024-06-11 ENCOUNTER — CLINICAL SUPPORT (OUTPATIENT)
Dept: URGENT CARE | Facility: CLINIC | Age: 59
End: 2024-06-11

## 2024-06-11 ENCOUNTER — APPOINTMENT (OUTPATIENT)
Dept: URGENT CARE | Facility: CLINIC | Age: 59
End: 2024-06-11

## 2024-06-11 DIAGNOSIS — Z02.1 PHYSICAL EXAM, PRE-EMPLOYMENT: ICD-10-CM

## 2024-06-11 LAB — RUBV IGG SERPL IA-ACNC: 16.4 IU/ML

## 2024-06-11 PROCEDURE — 86735 MUMPS ANTIBODY: CPT

## 2024-06-11 PROCEDURE — 86762 RUBELLA ANTIBODY: CPT

## 2024-06-11 PROCEDURE — 86787 VARICELLA-ZOSTER ANTIBODY: CPT

## 2024-06-11 PROCEDURE — 86765 RUBEOLA ANTIBODY: CPT

## 2024-06-11 PROCEDURE — 86706 HEP B SURFACE ANTIBODY: CPT

## 2024-06-11 PROCEDURE — 86480 TB TEST CELL IMMUN MEASURE: CPT

## 2024-06-12 LAB
GAMMA INTERFERON BACKGROUND BLD IA-ACNC: 0.01 IU/ML
HBV SURFACE AB SER-ACNC: 4.97 MIU/ML
M TB IFN-G BLD-IMP: NEGATIVE
M TB IFN-G CD4+ BCKGRND COR BLD-ACNC: 0 IU/ML
M TB IFN-G CD4+ BCKGRND COR BLD-ACNC: 0.01 IU/ML
MEV IGG SER QL IA: NORMAL
MITOGEN IGNF BCKGRD COR BLD-ACNC: 9.99 IU/ML
MUV IGG SER QL IA: ABNORMAL
VZV IGG SER QL IA: ABNORMAL

## 2024-06-19 DIAGNOSIS — Z79.899 HIGH RISK MEDICATION USE: ICD-10-CM

## 2024-06-19 DIAGNOSIS — L40.50 PSORIASIS WITH ARTHROPATHY (HCC): ICD-10-CM

## 2024-06-20 ENCOUNTER — TELEPHONE (OUTPATIENT)
Dept: RHEUMATOLOGY | Facility: CLINIC | Age: 59
End: 2024-06-20

## 2024-06-20 RX ORDER — MELOXICAM 15 MG/1
TABLET ORAL
Qty: 30 TABLET | Refills: 0 | Status: SHIPPED | OUTPATIENT
Start: 2024-06-20

## 2024-06-20 RX ORDER — FOLIC ACID 1 MG/1
1000 TABLET ORAL DAILY
Qty: 90 TABLET | Refills: 1 | Status: SHIPPED | OUTPATIENT
Start: 2024-06-20

## 2024-06-21 ENCOUNTER — APPOINTMENT (OUTPATIENT)
Dept: LAB | Facility: CLINIC | Age: 59
End: 2024-06-21
Payer: COMMERCIAL

## 2024-06-21 ENCOUNTER — OFFICE VISIT (OUTPATIENT)
Dept: FAMILY MEDICINE CLINIC | Facility: CLINIC | Age: 59
End: 2024-06-21
Payer: COMMERCIAL

## 2024-06-21 VITALS
HEIGHT: 64 IN | TEMPERATURE: 97.3 F | OXYGEN SATURATION: 99 % | SYSTOLIC BLOOD PRESSURE: 120 MMHG | RESPIRATION RATE: 16 BRPM | HEART RATE: 72 BPM | BODY MASS INDEX: 30.9 KG/M2 | WEIGHT: 181 LBS | DIASTOLIC BLOOD PRESSURE: 70 MMHG

## 2024-06-21 DIAGNOSIS — J45.40 MODERATE PERSISTENT REACTIVE AIRWAY DISEASE WITHOUT COMPLICATION: ICD-10-CM

## 2024-06-21 DIAGNOSIS — M54.50 CHRONIC BILATERAL LOW BACK PAIN WITHOUT SCIATICA: ICD-10-CM

## 2024-06-21 DIAGNOSIS — H25.9 AGE-RELATED CATARACT OF BOTH EYES, UNSPECIFIED AGE-RELATED CATARACT TYPE: ICD-10-CM

## 2024-06-21 DIAGNOSIS — E21.3 HYPERPARATHYROIDISM (HCC): ICD-10-CM

## 2024-06-21 DIAGNOSIS — B00.1 COLD SORE: ICD-10-CM

## 2024-06-21 DIAGNOSIS — Z00.00 ENCOUNTER FOR WELLNESS EXAMINATION IN ADULT: Primary | ICD-10-CM

## 2024-06-21 DIAGNOSIS — L40.50 PSORIASIS WITH ARTHROPATHY (HCC): Chronic | ICD-10-CM

## 2024-06-21 DIAGNOSIS — E55.9 VITAMIN D DEFICIENCY: ICD-10-CM

## 2024-06-21 DIAGNOSIS — R10.30 LOWER ABDOMINAL PAIN: ICD-10-CM

## 2024-06-21 DIAGNOSIS — Z12.31 ENCOUNTER FOR SCREENING MAMMOGRAM FOR BREAST CANCER: ICD-10-CM

## 2024-06-21 DIAGNOSIS — Z90.3 POSTGASTRECTOMY MALABSORPTION: ICD-10-CM

## 2024-06-21 DIAGNOSIS — K91.2 POSTGASTRECTOMY MALABSORPTION: ICD-10-CM

## 2024-06-21 DIAGNOSIS — B37.0 THRUSH, ORAL: ICD-10-CM

## 2024-06-21 DIAGNOSIS — G89.29 CHRONIC BILATERAL LOW BACK PAIN WITHOUT SCIATICA: ICD-10-CM

## 2024-06-21 DIAGNOSIS — E03.9 HYPOTHYROIDISM, UNSPECIFIED TYPE: ICD-10-CM

## 2024-06-21 DIAGNOSIS — M35.00 SJOGREN'S SYNDROME, WITH UNSPECIFIED ORGAN INVOLVEMENT (HCC): Chronic | ICD-10-CM

## 2024-06-21 DIAGNOSIS — F41.8 DEPRESSION WITH ANXIETY: ICD-10-CM

## 2024-06-21 DIAGNOSIS — I10 ESSENTIAL HYPERTENSION: ICD-10-CM

## 2024-06-21 LAB
25(OH)D3 SERPL-MCNC: 24 NG/ML (ref 30–100)
ALBUMIN SERPL BCG-MCNC: 3.9 G/DL (ref 3.5–5)
ALP SERPL-CCNC: 90 U/L (ref 34–104)
ALT SERPL W P-5'-P-CCNC: 28 U/L (ref 7–52)
ANION GAP SERPL CALCULATED.3IONS-SCNC: 8 MMOL/L (ref 4–13)
AST SERPL W P-5'-P-CCNC: 27 U/L (ref 13–39)
BASOPHILS # BLD AUTO: 0.09 THOUSANDS/ÂΜL (ref 0–0.1)
BASOPHILS NFR BLD AUTO: 1 % (ref 0–1)
BILIRUB SERPL-MCNC: 0.23 MG/DL (ref 0.2–1)
BUN SERPL-MCNC: 18 MG/DL (ref 5–25)
CALCIUM SERPL-MCNC: 8.8 MG/DL (ref 8.4–10.2)
CHLORIDE SERPL-SCNC: 102 MMOL/L (ref 96–108)
CHOLEST SERPL-MCNC: 163 MG/DL
CO2 SERPL-SCNC: 30 MMOL/L (ref 21–32)
CREAT SERPL-MCNC: 0.98 MG/DL (ref 0.6–1.3)
EOSINOPHIL # BLD AUTO: 0.29 THOUSAND/ÂΜL (ref 0–0.61)
EOSINOPHIL NFR BLD AUTO: 3 % (ref 0–6)
ERYTHROCYTE [DISTWIDTH] IN BLOOD BY AUTOMATED COUNT: 13.3 % (ref 11.6–15.1)
EST. AVERAGE GLUCOSE BLD GHB EST-MCNC: 128 MG/DL
FERRITIN SERPL-MCNC: 6 NG/ML (ref 11–307)
GFR SERPL CREATININE-BSD FRML MDRD: 63 ML/MIN/1.73SQ M
GLUCOSE P FAST SERPL-MCNC: 93 MG/DL (ref 65–99)
HBA1C MFR BLD: 6.1 %
HCT VFR BLD AUTO: 36 % (ref 34.8–46.1)
HDLC SERPL-MCNC: 77 MG/DL
HGB BLD-MCNC: 11.4 G/DL (ref 11.5–15.4)
IMM GRANULOCYTES # BLD AUTO: 0.03 THOUSAND/UL (ref 0–0.2)
IMM GRANULOCYTES NFR BLD AUTO: 0 % (ref 0–2)
IRON SATN MFR SERPL: 16 % (ref 15–50)
IRON SERPL-MCNC: 64 UG/DL (ref 50–212)
LDLC SERPL CALC-MCNC: 63 MG/DL (ref 0–100)
LYMPHOCYTES # BLD AUTO: 2.72 THOUSANDS/ÂΜL (ref 0.6–4.47)
LYMPHOCYTES NFR BLD AUTO: 30 % (ref 14–44)
MCH RBC QN AUTO: 29.3 PG (ref 26.8–34.3)
MCHC RBC AUTO-ENTMCNC: 31.7 G/DL (ref 31.4–37.4)
MCV RBC AUTO: 93 FL (ref 82–98)
MONOCYTES # BLD AUTO: 0.83 THOUSAND/ÂΜL (ref 0.17–1.22)
MONOCYTES NFR BLD AUTO: 9 % (ref 4–12)
NEUTROPHILS # BLD AUTO: 5.25 THOUSANDS/ÂΜL (ref 1.85–7.62)
NEUTS SEG NFR BLD AUTO: 57 % (ref 43–75)
NRBC BLD AUTO-RTO: 0 /100 WBCS
PLATELET # BLD AUTO: 379 THOUSANDS/UL (ref 149–390)
PMV BLD AUTO: 9.7 FL (ref 8.9–12.7)
POTASSIUM SERPL-SCNC: 4.3 MMOL/L (ref 3.5–5.3)
PROT SERPL-MCNC: 7.1 G/DL (ref 6.4–8.4)
PTH-INTACT SERPL-MCNC: 114 PG/ML (ref 12–88)
RBC # BLD AUTO: 3.89 MILLION/UL (ref 3.81–5.12)
SODIUM SERPL-SCNC: 140 MMOL/L (ref 135–147)
TIBC SERPL-MCNC: 401 UG/DL (ref 250–450)
TRIGL SERPL-MCNC: 115 MG/DL
TSH SERPL DL<=0.05 MIU/L-ACNC: 8.11 UIU/ML (ref 0.45–4.5)
UIBC SERPL-MCNC: 337 UG/DL (ref 155–355)
VIT B12 SERPL-MCNC: 210 PG/ML (ref 180–914)
WBC # BLD AUTO: 9.21 THOUSAND/UL (ref 4.31–10.16)

## 2024-06-21 PROCEDURE — 99214 OFFICE O/P EST MOD 30 MIN: CPT | Performed by: FAMILY MEDICINE

## 2024-06-21 PROCEDURE — 84443 ASSAY THYROID STIM HORMONE: CPT

## 2024-06-21 PROCEDURE — 83036 HEMOGLOBIN GLYCOSYLATED A1C: CPT

## 2024-06-21 PROCEDURE — 83970 ASSAY OF PARATHORMONE: CPT

## 2024-06-21 PROCEDURE — 82306 VITAMIN D 25 HYDROXY: CPT

## 2024-06-21 PROCEDURE — 83550 IRON BINDING TEST: CPT

## 2024-06-21 PROCEDURE — 82607 VITAMIN B-12: CPT

## 2024-06-21 PROCEDURE — 80061 LIPID PANEL: CPT

## 2024-06-21 PROCEDURE — 80053 COMPREHEN METABOLIC PANEL: CPT

## 2024-06-21 PROCEDURE — 99396 PREV VISIT EST AGE 40-64: CPT | Performed by: FAMILY MEDICINE

## 2024-06-21 PROCEDURE — 83540 ASSAY OF IRON: CPT

## 2024-06-21 PROCEDURE — 85025 COMPLETE CBC W/AUTO DIFF WBC: CPT

## 2024-06-21 PROCEDURE — 82728 ASSAY OF FERRITIN: CPT

## 2024-06-21 PROCEDURE — 36415 COLL VENOUS BLD VENIPUNCTURE: CPT

## 2024-06-21 RX ORDER — VALACYCLOVIR HYDROCHLORIDE 1 G/1
2000 TABLET, FILM COATED ORAL 2 TIMES DAILY
Qty: 4 TABLET | Refills: 3 | Status: SHIPPED | OUTPATIENT
Start: 2024-06-21 | End: 2024-06-22

## 2024-06-21 RX ORDER — CLOTRIMAZOLE 10 MG/1
10 LOZENGE ORAL; TOPICAL 4 TIMES DAILY
Qty: 40 TABLET | Refills: 0 | Status: SHIPPED | OUTPATIENT
Start: 2024-06-21

## 2024-06-21 RX ORDER — ERGOCALCIFEROL 1.25 MG/1
50000 CAPSULE ORAL
Qty: 6 CAPSULE | Refills: 0 | Status: SHIPPED | OUTPATIENT
Start: 2024-06-21 | End: 2024-06-25

## 2024-06-21 RX ORDER — ALBUTEROL SULFATE 90 UG/1
2 AEROSOL, METERED RESPIRATORY (INHALATION) EVERY 4 HOURS PRN
Qty: 8 G | Refills: 3 | Status: SHIPPED | OUTPATIENT
Start: 2024-06-21

## 2024-06-21 RX ORDER — METOPROLOL TARTRATE 50 MG/1
TABLET, FILM COATED ORAL
Qty: 90 TABLET | Refills: 3 | Status: SHIPPED | OUTPATIENT
Start: 2024-06-21 | End: 2024-06-23
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
## 2024-06-21 NOTE — PATIENT INSTRUCTIONS
Recent blood work indicates that you have no immunity to varicella, mumps and hepatitis B.  Please check with your employer/human resources regarding requirements for vaccinations.  We can administer those vaccines in the office if needed.  Please let me know  Routine blood work is ordered  Mammogram is past due/ordered, please schedule  Please proceed with x-ray of lumbar spine and hips  I recommend to schedule CT abdomen and pelvis due to persistent lower back pain and persistent lower abdominal pain

## 2024-06-23 PROBLEM — H25.9 AGE-RELATED CATARACT OF BOTH EYES: Status: ACTIVE | Noted: 2024-06-23

## 2024-06-23 PROBLEM — E55.9 VITAMIN D DEFICIENCY: Status: ACTIVE | Noted: 2024-06-23

## 2024-06-23 PROBLEM — R10.30 LOWER ABDOMINAL PAIN: Status: ACTIVE | Noted: 2024-06-23

## 2024-06-23 PROBLEM — Z98.890 POST-OPERATIVE STATE: Status: RESOLVED | Noted: 2023-07-14 | Resolved: 2024-06-23

## 2024-06-23 PROBLEM — B00.1 COLD SORE: Status: ACTIVE | Noted: 2024-06-23

## 2024-06-23 PROBLEM — M54.50 CHRONIC BILATERAL LOW BACK PAIN WITHOUT SCIATICA: Status: ACTIVE | Noted: 2024-06-23

## 2024-06-23 PROBLEM — G89.29 CHRONIC BILATERAL LOW BACK PAIN WITHOUT SCIATICA: Status: ACTIVE | Noted: 2024-06-23

## 2024-06-23 RX ORDER — METOPROLOL TARTRATE 50 MG/1
TABLET, FILM COATED ORAL
Start: 2024-06-23

## 2024-06-23 NOTE — ASSESSMENT & PLAN NOTE
Previously evaluated by endocrinology.  Patient was advised to follow-up with PCP.  Proceed with labs including PTH, CMP and vitamin D level
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
## 2024-06-25 DIAGNOSIS — K91.2 POSTGASTRECTOMY MALABSORPTION: Primary | ICD-10-CM

## 2024-06-25 DIAGNOSIS — E55.9 VITAMIN D DEFICIENCY: ICD-10-CM

## 2024-06-25 DIAGNOSIS — E03.9 HYPOTHYROIDISM, UNSPECIFIED TYPE: ICD-10-CM

## 2024-06-25 DIAGNOSIS — Z90.3 POSTGASTRECTOMY MALABSORPTION: Primary | ICD-10-CM

## 2024-06-25 RX ORDER — LEVOTHYROXINE SODIUM 112 UG/1
112 TABLET ORAL DAILY
Qty: 90 TABLET | Refills: 0 | Status: SHIPPED | OUTPATIENT
Start: 2024-06-25

## 2024-06-25 RX ORDER — FERROUS SULFATE 324(65)MG
324 TABLET, DELAYED RELEASE (ENTERIC COATED) ORAL DAILY
Qty: 90 TABLET | Refills: 1 | Status: SHIPPED | OUTPATIENT
Start: 2024-06-25

## 2024-06-25 RX ORDER — ERGOCALCIFEROL 1.25 MG/1
50000 CAPSULE ORAL WEEKLY
Qty: 12 CAPSULE | Refills: 0 | Status: SHIPPED | OUTPATIENT
Start: 2024-06-25

## 2024-06-28 ENCOUNTER — HOSPITAL ENCOUNTER (OUTPATIENT)
Dept: MAMMOGRAPHY | Facility: CLINIC | Age: 59
Discharge: HOME | End: 2024-06-28
Payer: COMMERCIAL

## 2024-06-28 VITALS — WEIGHT: 181 LBS | HEIGHT: 64 IN | BODY MASS INDEX: 30.9 KG/M2

## 2024-06-28 DIAGNOSIS — Z12.31 ENCOUNTER FOR SCREENING MAMMOGRAM FOR BREAST CANCER: ICD-10-CM

## 2024-06-28 PROCEDURE — 77067 SCR MAMMO BI INCL CAD: CPT

## 2024-06-28 PROCEDURE — 77063 BREAST TOMOSYNTHESIS BI: CPT

## 2024-07-25 ENCOUNTER — CATARACT EVALUATION (OUTPATIENT)
Dept: URBAN - METROPOLITAN AREA CLINIC 6 | Facility: CLINIC | Age: 59
End: 2024-07-25

## 2024-07-25 DIAGNOSIS — H25.813: ICD-10-CM

## 2024-07-25 DIAGNOSIS — H04.123: ICD-10-CM

## 2024-07-25 PROCEDURE — 99204 OFFICE O/P NEW MOD 45 MIN: CPT

## 2024-07-25 ASSESSMENT — KERATOMETRY
OD_K2POWER_DIOPTERS: 43.50
OS_AXISANGLE2_DEGREES: 71
OD_AXISANGLE_DEGREES: 14
OD_K1POWER_DIOPTERS: 42.25
OS_K1POWER_DIOPTERS: 42.25
OS_AXISANGLE_DEGREES: 161
OD_AXISANGLE2_DEGREES: 104
OS_K2POWER_DIOPTERS: 43.00

## 2024-07-25 ASSESSMENT — TONOMETRY
OD_IOP_MMHG: 20
OS_IOP_MMHG: 21

## 2024-07-25 ASSESSMENT — VISUAL ACUITY
OD_CC: 20/25
OD_GLARE: 20/40
OU_CC: J1+
OS_PH: 20/50+1
OS_CC: 20/60-2

## 2024-08-03 DIAGNOSIS — L40.50 PSORIASIS WITH ARTHROPATHY (HCC): ICD-10-CM

## 2024-08-05 RX ORDER — MELOXICAM 15 MG/1
TABLET ORAL
Qty: 30 TABLET | Refills: 2 | Status: SHIPPED | OUTPATIENT
Start: 2024-08-05

## 2024-08-07 ENCOUNTER — PRE-OP CATARACT MEASUREMENTS (OUTPATIENT)
Dept: URBAN - METROPOLITAN AREA CLINIC 6 | Facility: CLINIC | Age: 59
End: 2024-08-07

## 2024-08-07 ASSESSMENT — KERATOMETRY
OD_K1POWER_DIOPTERS: 42.25
OS_AXISANGLE_DEGREES: 168
OS_AXISANGLE2_DEGREES: 78
OD_AXISANGLE2_DEGREES: 104
OS_K2POWER_DIOPTERS: 43.00
OD_AXISANGLE2_DEGREES: 111
OS_K1POWER_DIOPTERS: 42.00
OD_AXISANGLE_DEGREES: 14
OS_AXISANGLE2_DEGREES: 71
OS_AXISANGLE_DEGREES: 161
OD_AXISANGLE_DEGREES: 021
OD_K2POWER_DIOPTERS: 43.50
OS_K2POWER_DIOPTERS: 42.75
OD_K2POWER_DIOPTERS: 43.00
OD_K1POWER_DIOPTERS: 41.75
OS_K1POWER_DIOPTERS: 42.25

## 2024-08-07 ASSESSMENT — TONOMETRY
OD_IOP_MMHG: 17
OS_IOP_MMHG: 17

## 2024-08-07 ASSESSMENT — VISUAL ACUITY
OS_CC: 20/100
OS_PH: 20/50-1
OD_CC: 20/30

## 2024-08-13 ENCOUNTER — OFFICE VISIT (OUTPATIENT)
Dept: FAMILY MEDICINE CLINIC | Facility: CLINIC | Age: 59
End: 2024-08-13
Payer: COMMERCIAL

## 2024-08-13 VITALS
HEART RATE: 71 BPM | WEIGHT: 180.2 LBS | RESPIRATION RATE: 16 BRPM | TEMPERATURE: 98 F | HEIGHT: 64 IN | OXYGEN SATURATION: 98 % | SYSTOLIC BLOOD PRESSURE: 124 MMHG | DIASTOLIC BLOOD PRESSURE: 78 MMHG | BODY MASS INDEX: 30.77 KG/M2

## 2024-08-13 DIAGNOSIS — L40.50 PSORIASIS WITH ARTHROPATHY (HCC): Chronic | ICD-10-CM

## 2024-08-13 DIAGNOSIS — J01.91 ACUTE RECURRENT SINUSITIS, UNSPECIFIED LOCATION: ICD-10-CM

## 2024-08-13 DIAGNOSIS — J32.9 SINUSITIS, UNSPECIFIED CHRONICITY, UNSPECIFIED LOCATION: ICD-10-CM

## 2024-08-13 DIAGNOSIS — J45.40 MODERATE PERSISTENT REACTIVE AIRWAY DISEASE WITHOUT COMPLICATION: ICD-10-CM

## 2024-08-13 DIAGNOSIS — H25.9 AGE-RELATED CATARACT OF BOTH EYES, UNSPECIFIED AGE-RELATED CATARACT TYPE: ICD-10-CM

## 2024-08-13 DIAGNOSIS — Z01.818 PRE-OP EXAMINATION: Primary | ICD-10-CM

## 2024-08-13 PROCEDURE — 99214 OFFICE O/P EST MOD 30 MIN: CPT | Performed by: FAMILY MEDICINE

## 2024-08-13 RX ORDER — MOMETASONE FUROATE MONOHYDRATE 50 UG/1
2 SPRAY, METERED NASAL DAILY
Qty: 51 G | Refills: 3 | Status: SHIPPED | OUTPATIENT
Start: 2024-08-13
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
## 2024-08-13 NOTE — PATIENT INSTRUCTIONS
Zyrtec/cetirizine 10 mg at bedtime  Please proceed with CT abdomen and pelvis, x-ray of lumbar spine and pelvis please schedule CT.  X-rays are walk-in.  CT scheduled at [phone]  Augmentin for 7 to 10 days for sinusitis  I will send surgical clearance to Dr. Sorensen

## 2024-09-07 DIAGNOSIS — E55.9 VITAMIN D DEFICIENCY: ICD-10-CM

## 2024-09-07 DIAGNOSIS — J45.40 MODERATE PERSISTENT REACTIVE AIRWAY DISEASE WITHOUT COMPLICATION: ICD-10-CM

## 2024-09-07 RX ORDER — BUDESONIDE AND FORMOTEROL FUMARATE DIHYDRATE 160; 4.5 UG/1; UG/1
2 AEROSOL RESPIRATORY (INHALATION) 2 TIMES DAILY
Qty: 10.2 G | Refills: 0 | Status: SHIPPED | OUTPATIENT
Start: 2024-09-07

## 2024-09-07 RX ORDER — ALBUTEROL SULFATE 90 UG/1
2 AEROSOL, METERED RESPIRATORY (INHALATION) EVERY 4 HOURS PRN
Qty: 8 G | Refills: 0 | Status: SHIPPED | OUTPATIENT
Start: 2024-09-07

## 2024-09-07 RX ORDER — ERGOCALCIFEROL 1.25 MG/1
50000 CAPSULE, LIQUID FILLED ORAL WEEKLY
Qty: 12 CAPSULE | Refills: 0 | Status: SHIPPED | OUTPATIENT
Start: 2024-09-07

## 2024-09-11 ENCOUNTER — 1 DAY POST-OP (OUTPATIENT)
Dept: URBAN - METROPOLITAN AREA CLINIC 6 | Facility: CLINIC | Age: 59
End: 2024-09-11

## 2024-09-11 DIAGNOSIS — H25.811: ICD-10-CM

## 2024-09-11 DIAGNOSIS — Z96.1: ICD-10-CM

## 2024-09-11 PROCEDURE — 92136 OPHTHALMIC BIOMETRY: CPT | Mod: 26,RT

## 2024-09-11 PROCEDURE — 99024 POSTOP FOLLOW-UP VISIT: CPT

## 2024-09-11 ASSESSMENT — KERATOMETRY
OD_AXISANGLE2_DEGREES: 111
OD_AXISANGLE_DEGREES: 021
OS_AXISANGLE2_DEGREES: 71
OS_AXISANGLE2_DEGREES: 78
OD_AXISANGLE_DEGREES: 14
OD_AXISANGLE2_DEGREES: 104
OS_AXISANGLE_DEGREES: 161
OS_K2POWER_DIOPTERS: 42.75
OD_K2POWER_DIOPTERS: 43.00
OD_K1POWER_DIOPTERS: 42.25
OS_K2POWER_DIOPTERS: 43.00
OS_K1POWER_DIOPTERS: 42.00
OD_K1POWER_DIOPTERS: 41.75
OS_K1POWER_DIOPTERS: 42.25
OS_AXISANGLE_DEGREES: 168
OD_K2POWER_DIOPTERS: 43.50

## 2024-09-11 ASSESSMENT — TONOMETRY
OS_IOP_MMHG: 23
OD_IOP_MMHG: 21

## 2024-09-11 ASSESSMENT — VISUAL ACUITY
OD_SC: J5
OD_SC: 20/100+1
OD_PH: 20/40
OS_SC: J5
OS_SC: 20/30-2

## 2024-09-25 ENCOUNTER — 1 DAY POST-OP (OUTPATIENT)
Dept: URBAN - METROPOLITAN AREA CLINIC 6 | Facility: CLINIC | Age: 59
End: 2024-09-25

## 2024-09-25 DIAGNOSIS — Z96.1: ICD-10-CM

## 2024-09-25 PROCEDURE — 99024 POSTOP FOLLOW-UP VISIT: CPT

## 2024-09-25 ASSESSMENT — KERATOMETRY
OD_K2POWER_DIOPTERS: 43.50
OS_AXISANGLE2_DEGREES: 78
OS_K1POWER_DIOPTERS: 42.25
OS_K2POWER_DIOPTERS: 42.75
OD_K1POWER_DIOPTERS: 42.25
OS_K1POWER_DIOPTERS: 42.00
OS_AXISANGLE_DEGREES: 161
OS_K2POWER_DIOPTERS: 43.00
OD_K2POWER_DIOPTERS: 43.00
OD_AXISANGLE2_DEGREES: 111
OD_AXISANGLE_DEGREES: 14
OS_AXISANGLE2_DEGREES: 71
OD_AXISANGLE2_DEGREES: 104
OD_AXISANGLE_DEGREES: 021
OD_K1POWER_DIOPTERS: 41.75
OS_AXISANGLE_DEGREES: 168

## 2024-09-25 ASSESSMENT — VISUAL ACUITY
OS_SC: 20/25
OD_SC: 20/50+2
OD_PH: 20/25

## 2024-09-25 ASSESSMENT — TONOMETRY: OD_IOP_MMHG: 18

## 2024-09-27 DIAGNOSIS — E03.9 HYPOTHYROIDISM, UNSPECIFIED TYPE: ICD-10-CM

## 2024-09-27 RX ORDER — LEVOTHYROXINE SODIUM 112 UG/1
112 TABLET ORAL DAILY
Qty: 90 TABLET | Refills: 0 | Status: SHIPPED | OUTPATIENT
Start: 2024-09-27

## 2024-09-28 DIAGNOSIS — Z00.6 ENCOUNTER FOR EXAMINATION FOR NORMAL COMPARISON OR CONTROL IN CLINICAL RESEARCH PROGRAM: ICD-10-CM

## 2024-09-29 ENCOUNTER — HOSPITAL ENCOUNTER (EMERGENCY)
Facility: HOSPITAL | Age: 59
Discharge: HOME | End: 2024-09-29
Attending: EMERGENCY MEDICINE | Admitting: EMERGENCY MEDICINE
Payer: COMMERCIAL

## 2024-09-29 ENCOUNTER — APPOINTMENT (EMERGENCY)
Dept: CT IMAGING | Facility: HOSPITAL | Age: 59
End: 2024-09-29
Payer: COMMERCIAL

## 2024-09-29 VITALS
SYSTOLIC BLOOD PRESSURE: 134 MMHG | RESPIRATION RATE: 14 BRPM | TEMPERATURE: 97.5 F | OXYGEN SATURATION: 95 % | HEART RATE: 71 BPM | DIASTOLIC BLOOD PRESSURE: 77 MMHG

## 2024-09-29 DIAGNOSIS — K04.7 DENTAL INFECTION: ICD-10-CM

## 2024-09-29 DIAGNOSIS — M27.2 ABSCESS OF MANDIBLE: Primary | ICD-10-CM

## 2024-09-29 DIAGNOSIS — K12.2 ORAL CELLULITIS: ICD-10-CM

## 2024-09-29 LAB
ALBUMIN SERPL BCG-MCNC: 3.9 G/DL (ref 3.5–5)
ALP SERPL-CCNC: 89 U/L (ref 34–104)
ALT SERPL W P-5'-P-CCNC: 16 U/L (ref 7–52)
ANION GAP SERPL CALCULATED.3IONS-SCNC: 7 MMOL/L (ref 4–13)
AST SERPL W P-5'-P-CCNC: 17 U/L (ref 13–39)
BASOPHILS # BLD AUTO: 0.04 THOUSANDS/ÂΜL (ref 0–0.1)
BASOPHILS NFR BLD AUTO: 1 % (ref 0–1)
BILIRUB SERPL-MCNC: 0.27 MG/DL (ref 0.2–1)
BUN SERPL-MCNC: 22 MG/DL (ref 5–25)
CALCIUM SERPL-MCNC: 8.7 MG/DL (ref 8.4–10.2)
CHLORIDE SERPL-SCNC: 102 MMOL/L (ref 96–108)
CO2 SERPL-SCNC: 29 MMOL/L (ref 21–32)
CREAT SERPL-MCNC: 1.12 MG/DL (ref 0.6–1.3)
EOSINOPHIL # BLD AUTO: 0.22 THOUSAND/ÂΜL (ref 0–0.61)
EOSINOPHIL NFR BLD AUTO: 3 % (ref 0–6)
ERYTHROCYTE [DISTWIDTH] IN BLOOD BY AUTOMATED COUNT: 12.7 % (ref 11.6–15.1)
GFR SERPL CREATININE-BSD FRML MDRD: 54 ML/MIN/1.73SQ M
GLUCOSE SERPL-MCNC: 126 MG/DL (ref 65–140)
HCT VFR BLD AUTO: 37.8 % (ref 34.8–46.1)
HGB BLD-MCNC: 12.1 G/DL (ref 11.5–15.4)
IMM GRANULOCYTES # BLD AUTO: 0.03 THOUSAND/UL (ref 0–0.2)
IMM GRANULOCYTES NFR BLD AUTO: 0 % (ref 0–2)
LYMPHOCYTES # BLD AUTO: 1.31 THOUSANDS/ÂΜL (ref 0.6–4.47)
LYMPHOCYTES NFR BLD AUTO: 15 % (ref 14–44)
MCH RBC QN AUTO: 30.2 PG (ref 26.8–34.3)
MCHC RBC AUTO-ENTMCNC: 32 G/DL (ref 31.4–37.4)
MCV RBC AUTO: 94 FL (ref 82–98)
MONOCYTES # BLD AUTO: 0.65 THOUSAND/ÂΜL (ref 0.17–1.22)
MONOCYTES NFR BLD AUTO: 8 % (ref 4–12)
NEUTROPHILS # BLD AUTO: 6.31 THOUSANDS/ÂΜL (ref 1.85–7.62)
NEUTS SEG NFR BLD AUTO: 73 % (ref 43–75)
NRBC BLD AUTO-RTO: 0 /100 WBCS
PLATELET # BLD AUTO: 295 THOUSANDS/UL (ref 149–390)
PMV BLD AUTO: 9.1 FL (ref 8.9–12.7)
POTASSIUM SERPL-SCNC: 4.1 MMOL/L (ref 3.5–5.3)
PROT SERPL-MCNC: 6.9 G/DL (ref 6.4–8.4)
RBC # BLD AUTO: 4.01 MILLION/UL (ref 3.81–5.12)
SODIUM SERPL-SCNC: 138 MMOL/L (ref 135–147)
WBC # BLD AUTO: 8.56 THOUSAND/UL (ref 4.31–10.16)

## 2024-09-29 PROCEDURE — 99283 EMERGENCY DEPT VISIT LOW MDM: CPT

## 2024-09-29 PROCEDURE — 70491 CT SOFT TISSUE NECK W/DYE: CPT

## 2024-09-29 PROCEDURE — 96374 THER/PROPH/DIAG INJ IV PUSH: CPT

## 2024-09-29 PROCEDURE — 85025 COMPLETE CBC W/AUTO DIFF WBC: CPT

## 2024-09-29 PROCEDURE — 80053 COMPREHEN METABOLIC PANEL: CPT

## 2024-09-29 PROCEDURE — 36415 COLL VENOUS BLD VENIPUNCTURE: CPT

## 2024-09-29 PROCEDURE — 99285 EMERGENCY DEPT VISIT HI MDM: CPT

## 2024-09-29 RX ORDER — DIPHENHYDRAMINE HCL 25 MG
12.5 TABLET ORAL ONCE
Status: COMPLETED | OUTPATIENT
Start: 2024-09-29 | End: 2024-09-29

## 2024-09-29 RX ORDER — DEXAMETHASONE 4 MG/1
10 TABLET ORAL ONCE
Status: COMPLETED | OUTPATIENT
Start: 2024-09-29 | End: 2024-09-29

## 2024-09-29 RX ADMIN — IOHEXOL 75 ML: 350 INJECTION, SOLUTION INTRAVENOUS at 20:36

## 2024-09-29 RX ADMIN — DIPHENHYDRAMINE HYDROCHLORIDE 12.5 MG: 25 TABLET ORAL at 19:43

## 2024-09-29 RX ADMIN — MORPHINE SULFATE 2 MG: 2 INJECTION, SOLUTION INTRAMUSCULAR; INTRAVENOUS at 19:28

## 2024-09-29 RX ADMIN — DEXAMETHASONE 10 MG: 4 TABLET ORAL at 21:21

## 2024-09-29 RX ADMIN — AMOXICILLIN AND CLAVULANATE POTASSIUM 1 TABLET: 875; 125 TABLET, FILM COATED ORAL at 21:16

## 2024-09-29 NOTE — ED PROVIDER NOTES
Final diagnoses:   Abscess of mandible   Oral cellulitis   Dental infection     ED Disposition       ED Disposition   Discharge    Condition   Stable    Date/Time   Sun Sep 29, 2024  9:11 PM    Comment   Paty Castromatthieu discharge to home/self care.                   Assessment & Plan       Medical Decision Making  Patient seen and examined noted to have abscess of mandible, oral cellulitis and dental infection.  Due to concern for the size of her abscess and facial swelling labs and imaging was initiated.  Labs did not show an elevated white blood cell count or any acute abnormalities.  CT showed a right mandibular and submandibular subcutaneous cellulitis as well as a subperiosteal 2 cm abscess.  Was unable to drain this because no area of fluctuance was identified on my exam.  Patient was given antibiotics here in the department and will complete a course at home.  Patient will follow-up with her dentist.  Strict return precautions were discussed which she expressed her understanding of.    Differential diagnosis includes but is not limited to dental shahnaz, dental infection, dental abscess, dry socket, gingivitis; doubt fab's angina.      Patient's labs notable for: Unremarkable    Imaging revealed:   Above     Patient appears well, is nontoxic appearing, she expresses understanding and agrees with plan of care at this time.  In light of this patient would benefit from outpatient management.    Patient was rx'd: Augmentin    Patient at time of discharge well-appearing in no acute distress, all questions answered. Patient agreeable to plan.  Patient's vitals, lab/imaging results, diagnosis, and treatment plan were discussed with the patient. All new/changed medications were discussed with patient, specifically, route of administration, how often and when to take, and where they can be picked up. Strict return precautions as well as close follow up with PCP was discussed with the patient and the patient was  "agreeable to my recommendations. Patient verbally acknowledged understanding of the above communications. Strict return precautions were provided. All labs reviewed and utilized in the medical decision making process (if labs were ordered). Portions of the record may have been created with voice recognition software.  Occasional wrong word or \"sound a like\" substitutions may have occurred due to the inherent limitations of voice recognition software.  Read the chart carefully and recognize, using context, where substitutions have occurred.      Amount and/or Complexity of Data Reviewed  Labs: ordered.  Radiology: ordered.    Risk  OTC drugs.  Prescription drug management.             Medications   morphine injection 2 mg (2 mg Intravenous Given 9/29/24 1928)   diphenhydrAMINE (BENADRYL) tablet 12.5 mg (12.5 mg Oral Given 9/29/24 1943)   iohexol (OMNIPAQUE) 350 MG/ML injection (MULTI-DOSE) 75 mL (75 mL Intravenous Given 9/29/24 2036)   amoxicillin-clavulanate (AUGMENTIN) 875-125 mg per tablet 1 tablet (1 tablet Oral Given 9/29/24 2116)   dexamethasone (DECADRON) tablet 10 mg (10 mg Oral Given 9/29/24 2121)       ED Risk Strat Scores                           SBIRT 22yo+      Flowsheet Row Most Recent Value   Initial Alcohol Screen: US AUDIT-C     1. How often do you have a drink containing alcohol? 0 Filed at: 09/29/2024 1821   2. How many drinks containing alcohol do you have on a typical day you are drinking?  0 Filed at: 09/29/2024 1821   3b. FEMALE Any Age, or MALE 65+: How often do you have 4 or more drinks on one occassion? 0 Filed at: 09/29/2024 1821   Audit-C Score 0 Filed at: 09/29/2024 1821   GUZMAN: How many times in the past year have you...    Used an illegal drug or used a prescription medication for non-medical reasons? Never Filed at: 09/29/2024 1821                            History of Present Illness       Chief Complaint   Patient presents with    Facial Swelling     Right lower facial swelling " started yesterday.        Past Medical History:   Diagnosis Date    Allergic 1993    Seasonal    Anemia     Anxiety     Disorder, per [employer]    Arthritis     Asthma 1993    Mild    Depression 1985    Disease of thyroid gland     Diverticulitis of colon     Dry eye syndrome     GERD (gastroesophageal reflux disease)     Hypertension 2015    Hypoparathyroidism (HCC)     Osteoarthritis     Psoriatic arthritis (HCC)     Scoliosis     Sjogren's disease (HCC)     Visual impairment     Wear glasses      Past Surgical History:   Procedure Laterality Date    BUNIONECTOMY       SECTION      2 X's    GASTRIC BYPASS      For Morbid Obesity, per [employer]    HIP SURGERY      JOINT REPLACEMENT  2023    Rt foot    MOUTH SURGERY      TN HALLUX RIGIDUS W/CHEILECTOMY 1ST MP JT W/IMPLT Right 2023    Procedure: CHEILECTOMY WITH TOTAL JOINT REPLACEMENT RIGHT 1ST MPJ;  Surgeon: Jacinto Gipson DPM;  Location: [facility];  Service: Podiatry    TN REMOVAL IMPLANT DEEP Right 2023    Procedure: REMOVAL HARDWARE FOOT RIGHT FOOT;  Surgeon: Jacinto Gipson DPM;  Location: [facility];  Service: Podiatry    SINUS SURGERY        Family History   Problem Relation Age of Onset    Dementia Mother     Osteopenia Mother     Mental illness Mother     Depression Mother     Thyroid disease Mother     Arthritis Mother     Autoimmune disease Mother     Hearing loss Mother     Leukemia Father     Diabetes unspecified Father     Diabetes Father     Cancer Father         Acute myologenous leukemia      Anxiety disorder Father         War V[employer] [place] [employer]    Hypothyroidism Sister     Breast cancer Maternal Grandmother         breast removed Left    Breast cancer Maternal Aunt     Cancer Paternal Uncle         same dx as мария barney Sibling      Social History     Tobacco Use    Smoking status: Former     Current packs/day: 0.00     Average packs/day: 0.5 packs/day for 6.6 years (3.3 ttl pk-yrs)     Types:  Cigarettes     Start date: 1993     Quit date: 2000     Years since quittin.3    Smokeless tobacco: Never    Tobacco comments:     Never a smoker, per [employer]   Vaping Use    Vaping status: Never Used   Substance Use Topics    Alcohol use: No    Drug use: No      E-Cigarette/Vaping    E-Cigarette Use Never User       E-Cigarette/Vaping Substances    Nicotine No     THC No     CBD No     Flavoring No     Other No     Unknown No       I have reviewed and agree with the history as documented.     Paty Rowe is a 59 y.o. female with a PMH of asthma, sinusitis, psoriasis, dental abscesses presenting to the ED on 2024 with facial swelling. Patient endorses that she has right lower facial swelling that started yesterday. Patient states that she has a history of dental abscesses and had some leftover Augmentin at home and has taken a dose of this. She has increased pain to the tooth around the area of swelling. She has not noticed purulent drainage into her mouth. Patient denies fevers, chills, nausea, vomiting, shortness of breath, difficulty swallowing, difficulty breathing or any other complaints at this time.             Review of Systems   Constitutional:  Negative for appetite change, chills and fever.   HENT:  Positive for facial swelling. Negative for drooling, sinus pressure, sinus pain, sore throat, trouble swallowing and voice change.    Respiratory:  Negative for choking, shortness of breath, wheezing and stridor.    Cardiovascular:  Negative for chest pain and palpitations.   Gastrointestinal:  Negative for nausea and vomiting.   Musculoskeletal:  Negative for neck pain and neck stiffness.   Skin:  Positive for color change.        erythema   Neurological:  Negative for syncope and speech difficulty.           Objective       ED Triage Vitals   Temperature Pulse Blood Pressure Respirations SpO2 Patient Position - Orthostatic VS   24 1818 24 1818 24  1818 09/29/24 1818 09/29/24 1818 09/29/24 1818   97.5 °F (36.4 °C) 80 129/64 18 99 % Sitting      Temp Source Heart Rate Source BP Location FiO2 (%) Pain Score    09/29/24 1818 09/29/24 1818 09/29/24 1818 -- 09/29/24 1928    Oral Monitor;Right Right arm  5      Vitals      Date and Time Temp Pulse SpO2 Resp BP Pain Score FACES Pain Rating User   09/29/24 2100 -- 71 95 % 14 134/77 -- --    09/29/24 2000 -- 71 95 % 16 118/70 -- --    09/29/24 1930 -- 71 98 % 16 117/64 -- --    09/29/24 1928 -- -- -- -- -- 5 --    09/29/24 1818 97.5 °F (36.4 °C) 80 99 % 18 129/64 -- -- C(S            Physical Exam  Vitals and nursing note reviewed.   Constitutional:       General: She is not in acute distress.     Appearance: Normal appearance. She is normal weight. She is not ill-appearing, toxic-appearing or diaphoretic.   HENT:      Head: Normocephalic and atraumatic.      Right Ear: Tympanic membrane, ear canal and external ear normal. There is no impacted cerumen.      Left Ear: Tympanic membrane, ear canal and external ear normal. There is no impacted cerumen.      Nose: Nose normal. No congestion or rhinorrhea.      Mouth/Throat:      Mouth: Mucous membranes are moist.      Dentition: Dental tenderness and gingival swelling present. No dental abscesses.      Pharynx: No uvula swelling.      Tonsils: No tonsillar exudate or tonsillar abscesses.      Comments: Swelling to patient's right lower jaw and the intraoral tissues however no fluctuance or drainable abscess on my exam.  Eyes:      General: No scleral icterus.        Right eye: No discharge.         Left eye: No discharge.      Extraocular Movements: Extraocular movements intact.      Conjunctiva/sclera: Conjunctivae normal.   Cardiovascular:      Rate and Rhythm: Normal rate and regular rhythm.      Heart sounds: Normal heart sounds. No murmur heard.     No friction rub. No gallop.   Pulmonary:      Effort: Pulmonary effort is normal. No respiratory distress.       Breath sounds: Normal breath sounds. No wheezing, rhonchi or rales.   Abdominal:      General: Abdomen is flat.   Musculoskeletal:         General: No signs of injury. Normal range of motion.      Cervical back: Normal range of motion and neck supple. No rigidity.   Skin:     General: Skin is warm.      Capillary Refill: Capillary refill takes less than 2 seconds.      Coloration: Skin is not pale.      Findings: No erythema.   Neurological:      General: No focal deficit present.      Mental Status: She is alert and oriented to person, place, and time. Mental status is at baseline.      Motor: No weakness.      Gait: Gait normal.   Psychiatric:         Mood and Affect: Mood normal.         Behavior: Behavior normal.         Results Reviewed       Procedure Component Value Units Date/Time    Comprehensive metabolic panel [[id number]] Collected: 09/29/24 1927    Lab Status: Final result Specimen: Blood from Arm, Left Updated: 09/29/24 1957     Sodium 138 mmol/L      Potassium 4.1 mmol/L      Chloride 102 mmol/L      CO2 29 mmol/L      ANION GAP 7 mmol/L      BUN 22 mg/dL      Creatinine 1.12 mg/dL      Glucose 126 mg/dL      Calcium 8.7 mg/dL      AST 17 U/L      ALT 16 U/L      Alkaline Phosphatase 89 U/L      Total Protein 6.9 g/dL      Albumin 3.9 g/dL      Total Bilirubin 0.27 mg/dL      eGFR 54 ml/min/1.73sq m     Narrative:      National Kidney Disease Foundation guidelines for Chronic Kidney Disease (CKD):     Stage 1 with normal or high GFR (GFR > 90 mL/min/1.73 square meters)    Stage 2 Mild CKD (GFR = 60-89 mL/min/1.73 square meters)    Stage 3A Moderate CKD (GFR = 45-59 mL/min/1.73 square meters)    Stage 3B Moderate CKD (GFR = 30-44 mL/min/1.73 square meters)    Stage 4 Severe CKD (GFR = 15-29 mL/min/1.73 square meters)    Stage 5 End Stage CKD (GFR <15 mL/min/1.73 square meters)  Note: GFR calculation is accurate only with a steady state creatinine    CBC and differential [[id number]] Collected: 09/29/24  1927    Lab Status: Final result Specimen: Blood from Arm, Left Updated: 09/29/24 1936     WBC 8.56 Thousand/uL      RBC 4.01 Million/uL      Hemoglobin 12.1 g/dL      Hematocrit 37.8 %      MCV 94 fL      MCH 30.2 pg      MCHC 32.0 g/dL      RDW 12.7 %      MPV 9.1 fL      Platelets 295 Thousands/uL      nRBC 0 /100 WBCs      Segmented % 73 %      Immature Grans % 0 %      Lymphocytes % 15 %      Monocytes % 8 %      Eosinophils Relative 3 %      Basophils Relative 1 %      Absolute Neutrophils 6.31 Thousands/µL      Absolute Immature Grans 0.03 Thousand/uL      Absolute Lymphocytes 1.31 Thousands/µL      Absolute Monocytes 0.65 Thousand/µL      Eosinophils Absolute 0.22 Thousand/µL      Basophils Absolute 0.04 Thousands/µL             CT soft tissue neck w contrast   Final Interpretation by Mann Adames MD (09/29 2050)      Right mandibular and submandibular subcutaneous cellulitis also involving the buccal soft tissues. Adjacent right mandibular subperiosteal 2 cm abscess.      Limited evaluation of the right mandibular teeth. Possible subperiosteal abscess involving the right mandibular first molar suggesting odontogenic source of infection.      The study was marked in EPIC for immediate notification.            Workstation performed: [id number]             Procedures    ED Medication and Procedure Management   Prior to Admission Medications   Prescriptions Last Dose Informant Patient Reported? Taking?   Diclofenac Sodium (VOLTAREN) 1 %   No No   Sig: Apply 4 g to lower extremity joints and 2 g to upper extremity joints 3-4 times a day as needed   albuterol (PROVENTIL HFA,VENTOLIN HFA) 90 mcg/act inhaler   No No   Sig: Inhale 2 puffs every 4 (four) hours as needed for wheezing or shortness of breath (cough)   amphetamine-dextroamphetamine (ADDERALL) 15 MG tablet  Self Yes No   Sig: Take 1 tablet by mouth daily as needed   buPROPion (WELLBUTRIN XL) 300 mg 24 hr tablet  Self Yes No   Sig: Take 1 tablet by  mouth daily   budesonide-formoterol (Symbicort) 160-4.5 mcg/act inhaler   No No   Sig: Inhale 2 puffs 2 (two) times a day Rinse mouth after use.   clotrimazole (MYCELEX) 10 mg sarhay   No No   Sig: Take 1 tablet (10 mg total) by mouth 4 (four) times a day   ergocalciferol (VITAMIN D2) 50,000 units   No No   Sig: Take 1 capsule (50,000 Units total) by mouth once a week   escitalopram (LEXAPRO) 20 mg tablet  Self Yes No   Sig: Take 1 tablet by mouth daily   ferrous sulfate 324 (65 Fe) mg   No No   Sig: Take 1 tablet (324 mg total) by mouth daily   folic acid (FOLVITE) 1 mg tablet   No No   Sig: TAKE ONE TABLET BY MOUTH EVERY DAY   gabapentin (NEURONTIN) 100 mg capsule   No No   Sig: Take 3 capsules (300 mg total) by mouth 2 (two) times a day   levothyroxine 112 mcg tablet   No No   Sig: Take 1 tablet (112 mcg total) by mouth daily   meloxicam (MOBIC) 15 mg tablet   No No   Sig: TAKE ONE TABLET BY MOUTH EVERY DAY AS NEEDED FOR MODERATE PAIN   metoprolol tartrate (LOPRESSOR) 50 mg tablet   No No   Sig: TAKE 1 TABLET BY MOUTH EVERY DAY   mometasone (NASONEX) 50 mcg/act nasal spray   No No   Si sprays into each nostril daily   pantoprazole (PROTONIX) 40 mg tablet   No No   Sig: TAKE ONE TABLET BY MOUTH EVERY DAY 30 MINUTES BEFORE BREAKFAST   pilocarpine (SALAGEN) 5 mg tablet   No No   Sig: TAKE ONE TABLET BY MOUTH THREE TIMES A DAY   rosuvastatin (CRESTOR) 5 mg tablet   No No   Sig: TAKE ONE TABLET BY MOUTH EVERY DAY   triamcinolone (KENALOG) 0.5 % ointment  Self No No   Sig: Apply 1 application topically 2 (two) times a day To affected area   valACYclovir (VALTREX) 1,000 mg tablet   No No   Sig: Take 2 tablets (2,000 mg total) by mouth 2 (two) times a day for 1 day 2 Tablets Twice Daily As Needed (4 tablets total)      Facility-Administered Medications: None     Discharge Medication List as of 2024  9:14 PM        START taking these medications    Details   amoxicillin-clavulanate (AUGMENTIN) 875-125 mg per  tablet Take 1 tablet by mouth every 12 (twelve) hours for 10 days, Starting Sun 9/29/2024, Until Wed 10/9/2024, Normal           CONTINUE these medications which have NOT CHANGED    Details   albuterol (PROVENTIL HFA,VENTOLIN HFA) 90 mcg/act inhaler Inhale 2 puffs every 4 (four) hours as needed for wheezing or shortness of breath (cough), Starting Sat 9/7/2024, Normal      amphetamine-dextroamphetamine (ADDERALL) 15 MG tablet Take 1 tablet by mouth daily as needed, Starting Wed 4/1/2020, Historical Med      budesonide-formoterol (Symbicort) 160-4.5 mcg/act inhaler Inhale 2 puffs 2 (two) times a day Rinse mouth after use., Starting Sat 9/7/2024, Normal      buPROPion (WELLBUTRIN XL) 300 mg 24 hr tablet Take 1 tablet by mouth daily, Historical Med      clotrimazole (MYCELEX) 10 mg sarahy Take 1 tablet (10 mg total) by mouth 4 (four) times a day, Starting Fri 6/21/2024, Normal      Diclofenac Sodium (VOLTAREN) 1 % Apply 4 g to lower extremity joints and 2 g to upper extremity joints 3-4 times a day as needed, Normal      ergocalciferol (VITAMIN D2) 50,000 units Take 1 capsule (50,000 Units total) by mouth once a week, Starting Sat 9/7/2024, Normal      escitalopram (LEXAPRO) 20 mg tablet Take 1 tablet by mouth daily, Historical Med      ferrous sulfate 324 (65 Fe) mg Take 1 tablet (324 mg total) by mouth daily, Starting Tue 6/25/2024, Normal      folic acid (FOLVITE) 1 mg tablet TAKE ONE TABLET BY MOUTH EVERY DAY, Starting Thu 6/20/2024, Normal      gabapentin (NEURONTIN) 100 mg capsule Take 3 capsules (300 mg total) by mouth 2 (two) times a day, Starting Sat 2/17/2024, Normal      levothyroxine 112 mcg tablet Take 1 tablet (112 mcg total) by mouth daily, Starting Fri 9/27/2024, Normal      meloxicam (MOBIC) 15 mg tablet TAKE ONE TABLET BY MOUTH EVERY DAY AS NEEDED FOR MODERATE PAIN, Normal      metoprolol tartrate (LOPRESSOR) 50 mg tablet TAKE 1 TABLET BY MOUTH EVERY DAY, No Print      mometasone (NASONEX) 50 mcg/act  nasal spray 2 sprays into each nostril daily, Starting Tue 8/13/2024, Normal      pantoprazole (PROTONIX) 40 mg tablet TAKE ONE TABLET BY MOUTH EVERY DAY 30 MINUTES BEFORE BREAKFAST, Normal      pilocarpine (SALAGEN) 5 mg tablet TAKE ONE TABLET BY MOUTH THREE TIMES A DAY, Starting Mon 4/1/2024, Normal      rosuvastatin (CRESTOR) 5 mg tablet TAKE ONE TABLET BY MOUTH EVERY DAY, Starting Thu 11/2/2023, Normal      triamcinolone (KENALOG) 0.5 % ointment Apply 1 application topically 2 (two) times a day To affected area, Starting Thu 6/16/2022, Normal      valACYclovir (VALTREX) 1,000 mg tablet Take 2 tablets (2,000 mg total) by mouth 2 (two) times a day for 1 day 2 Tablets Twice Daily As Needed (4 tablets total), Starting Fri 6/21/2024, Until Tue 8/13/2024, Normal           No discharge procedures on file.  ED SEPSIS DOCUMENTATION   Time reflects when diagnosis was documented in both MDM as applicable and the Disposition within this note       Time User Action Codes Description Comment    9/29/2024  9:11 PM Cadence Damian Add [H70.011] Subperiosteal abscess of mastoid, right     9/29/2024  9:11 PM Cadence Damian Remove [H70.011] Subperiosteal abscess of mastoid, right     9/29/2024  9:12 PM Cadence Damian Add [M27.2] Abscess of mandible     9/29/2024  9:12 PM Cadence Damian Add [K12.2] Oral cellulitis     9/29/2024  9:12 PM Cadence Damian Add [K04.7] Dental infection                  Cadence Damian PA-C  10/04/24 0654

## 2024-09-30 NOTE — DISCHARGE INSTRUCTIONS
Please take your antibiotic 2 times a day for the next 10 days.  Follow-up with your oral surgeon tomorrow morning.  Return to the emergency department if symptoms worsen.

## 2024-10-14 ENCOUNTER — 1 WEEK POST-OP (OUTPATIENT)
Dept: URBAN - METROPOLITAN AREA CLINIC 6 | Facility: CLINIC | Age: 59
End: 2024-10-14

## 2024-10-14 DIAGNOSIS — Z96.1: ICD-10-CM

## 2024-10-14 PROCEDURE — 99024 POSTOP FOLLOW-UP VISIT: CPT

## 2024-10-14 ASSESSMENT — KERATOMETRY
OD_AXISANGLE_DEGREES: 021
OS_K1POWER_DIOPTERS: 42.25
OD_AXISANGLE2_DEGREES: 111
OD_K2POWER_DIOPTERS: 43.50
OS_AXISANGLE_DEGREES: 168
OS_AXISANGLE2_DEGREES: 78
OD_K1POWER_DIOPTERS: 41.75
OD_K2POWER_DIOPTERS: 43.00
OD_AXISANGLE_DEGREES: 14
OS_AXISANGLE2_DEGREES: 71
OD_K1POWER_DIOPTERS: 42.25
OS_K1POWER_DIOPTERS: 42.00
OS_AXISANGLE_DEGREES: 161
OS_K2POWER_DIOPTERS: 42.75
OD_AXISANGLE2_DEGREES: 104
OS_K2POWER_DIOPTERS: 43.00

## 2024-10-14 ASSESSMENT — VISUAL ACUITY
OD_SC: 20/40+2
OD_PH: 20/30
OS_SC: 20/30+1

## 2024-10-14 ASSESSMENT — TONOMETRY
OD_IOP_MMHG: 19
OS_IOP_MMHG: 19

## 2024-10-16 DIAGNOSIS — K21.9 CHRONIC GERD: ICD-10-CM

## 2024-10-16 RX ORDER — PANTOPRAZOLE SODIUM 40 MG/1
TABLET, DELAYED RELEASE ORAL
Qty: 90 TABLET | Refills: 0 | Status: SHIPPED | OUTPATIENT
Start: 2024-10-16

## 2024-12-04 DIAGNOSIS — L40.50 PSORIASIS WITH ARTHROPATHY (HCC): ICD-10-CM

## 2024-12-05 RX ORDER — MELOXICAM 15 MG/1
TABLET ORAL
Qty: 30 TABLET | Refills: 5 | Status: SHIPPED | OUTPATIENT
Start: 2024-12-05

## 2025-01-03 DIAGNOSIS — K21.9 CHRONIC GERD: ICD-10-CM

## 2025-01-03 DIAGNOSIS — E78.2 MIXED HYPERLIPIDEMIA: ICD-10-CM

## 2025-01-03 DIAGNOSIS — E55.9 VITAMIN D DEFICIENCY: ICD-10-CM

## 2025-01-03 DIAGNOSIS — E03.9 HYPOTHYROIDISM, UNSPECIFIED TYPE: ICD-10-CM

## 2025-01-04 RX ORDER — ROSUVASTATIN CALCIUM 5 MG/1
5 TABLET, COATED ORAL DAILY
Qty: 30 TABLET | Refills: 5 | Status: SHIPPED | OUTPATIENT
Start: 2025-01-04

## 2025-01-04 RX ORDER — LEVOTHYROXINE SODIUM 112 UG/1
112 TABLET ORAL DAILY
Qty: 90 TABLET | Refills: 0 | Status: SHIPPED | OUTPATIENT
Start: 2025-01-04

## 2025-01-04 RX ORDER — ERGOCALCIFEROL 1.25 MG/1
50000 CAPSULE, LIQUID FILLED ORAL WEEKLY
Qty: 12 CAPSULE | Refills: 0 | Status: SHIPPED | OUTPATIENT
Start: 2025-01-04

## 2025-01-04 RX ORDER — PANTOPRAZOLE SODIUM 40 MG/1
TABLET, DELAYED RELEASE ORAL
Qty: 90 TABLET | Refills: 0 | Status: SHIPPED | OUTPATIENT
Start: 2025-01-04

## 2025-02-10 ENCOUNTER — FOLLOW UP (OUTPATIENT)
Dept: URBAN - METROPOLITAN AREA CLINIC 6 | Facility: CLINIC | Age: 60
End: 2025-02-10

## 2025-02-10 DIAGNOSIS — D31.32: ICD-10-CM

## 2025-02-10 DIAGNOSIS — H04.123: ICD-10-CM

## 2025-02-10 DIAGNOSIS — Z96.1: ICD-10-CM

## 2025-02-10 PROCEDURE — 92014 COMPRE OPH EXAM EST PT 1/>: CPT

## 2025-02-10 ASSESSMENT — KERATOMETRY
OS_AXISANGLE_DEGREES: 168
OS_AXISANGLE2_DEGREES: 71
OD_AXISANGLE2_DEGREES: 104
OD_K2POWER_DIOPTERS: 43.00
OD_K2POWER_DIOPTERS: 43.50
OD_AXISANGLE_DEGREES: 021
OS_K1POWER_DIOPTERS: 42.00
OD_AXISANGLE_DEGREES: 14
OS_K2POWER_DIOPTERS: 43.00
OD_K1POWER_DIOPTERS: 42.25
OS_K2POWER_DIOPTERS: 42.75
OD_AXISANGLE2_DEGREES: 111
OS_K1POWER_DIOPTERS: 42.25
OD_K1POWER_DIOPTERS: 41.75
OS_AXISANGLE2_DEGREES: 78
OS_AXISANGLE_DEGREES: 161

## 2025-02-10 ASSESSMENT — VISUAL ACUITY
OS_SC: 20/25-1
OD_SC: 20/30
OU_SC: J7

## 2025-02-10 ASSESSMENT — TONOMETRY
OD_IOP_MMHG: 16
OS_IOP_MMHG: 17

## 2025-03-19 ENCOUNTER — TELEPHONE (OUTPATIENT)
Dept: FAMILY MEDICINE CLINIC | Facility: CLINIC | Age: 60
End: 2025-03-19

## 2025-03-19 DIAGNOSIS — D50.8 OTHER IRON DEFICIENCY ANEMIA: ICD-10-CM

## 2025-03-19 DIAGNOSIS — Z90.3 POSTGASTRECTOMY MALABSORPTION: Primary | ICD-10-CM

## 2025-03-19 DIAGNOSIS — K21.9 CHRONIC GERD: ICD-10-CM

## 2025-03-19 DIAGNOSIS — K91.2 POSTGASTRECTOMY MALABSORPTION: Primary | ICD-10-CM

## 2025-03-19 DIAGNOSIS — E03.9 HYPOTHYROIDISM, UNSPECIFIED TYPE: ICD-10-CM

## 2025-03-19 DIAGNOSIS — E55.9 VITAMIN D DEFICIENCY: ICD-10-CM

## 2025-03-20 RX ORDER — PANTOPRAZOLE SODIUM 40 MG/1
40 TABLET, DELAYED RELEASE ORAL
Qty: 90 TABLET | Refills: 1 | Status: SHIPPED | OUTPATIENT
Start: 2025-03-20

## 2025-03-25 RX ORDER — ERGOCALCIFEROL 1.25 MG/1
50000 CAPSULE, LIQUID FILLED ORAL
Qty: 6 CAPSULE | Refills: 0 | Status: SHIPPED | OUTPATIENT
Start: 2025-03-25

## 2025-03-26 NOTE — TELEPHONE ENCOUNTER
Please contact the patient    I refilled vitamin D prescription.  I lowered the dose from once a week to every other week.    Patient has not proceeded with follow-up blood work that I ordered back in June 2024.    I will place complete new blood work panel orders.  She should proceed and schedule physical.    Thank you

## 2025-04-01 NOTE — TELEPHONE ENCOUNTER
Sent [employer] message for patient to schedule an appointment soon as Dr Sparks is booking out until beginning of June.

## 2025-04-11 DIAGNOSIS — E03.9 HYPOTHYROIDISM, UNSPECIFIED TYPE: ICD-10-CM

## 2025-04-11 RX ORDER — LEVOTHYROXINE SODIUM 112 UG/1
112 TABLET ORAL DAILY
Qty: 90 TABLET | Refills: 0 | Status: SHIPPED | OUTPATIENT
Start: 2025-04-11

## 2025-04-29 DIAGNOSIS — M35.05 SJOGREN SYNDROME WITH INFLAMMATORY ARTHRITIS (HCC): ICD-10-CM

## 2025-04-30 RX ORDER — PILOCARPINE HYDROCHLORIDE 5 MG/1
5 TABLET, FILM COATED ORAL 3 TIMES DAILY
Qty: 90 TABLET | Refills: 3 | OUTPATIENT
Start: 2025-04-30

## 2025-04-30 NOTE — TELEPHONE ENCOUNTER
Left message for patient to call back to schedule follow up appointment. Advised no refills at this time due to last seen 4/2023 needs to follow up.

## 2025-06-17 DIAGNOSIS — L40.50 PSORIASIS WITH ARTHROPATHY (HCC): ICD-10-CM

## 2025-06-18 RX ORDER — MELOXICAM 15 MG/1
TABLET ORAL
Qty: 30 TABLET | Refills: 0 | Status: SHIPPED | OUTPATIENT
Start: 2025-06-18

## 2025-06-18 NOTE — TELEPHONE ENCOUNTER
Patient needs an appointment. Please contact the patient to schedule an appointment. Last office visit: 08/13/24. Patient needs a 6 month appointment. Courtesy refill provided.

## 2025-07-15 DIAGNOSIS — E03.9 HYPOTHYROIDISM, UNSPECIFIED TYPE: ICD-10-CM

## 2025-07-15 DIAGNOSIS — I10 ESSENTIAL HYPERTENSION: ICD-10-CM

## 2025-07-17 ENCOUNTER — OFFICE VISIT (OUTPATIENT)
Dept: FAMILY MEDICINE CLINIC | Facility: CLINIC | Age: 60
End: 2025-07-17
Payer: COMMERCIAL

## 2025-07-17 VITALS
HEART RATE: 75 BPM | SYSTOLIC BLOOD PRESSURE: 114 MMHG | TEMPERATURE: 97.5 F | HEIGHT: 64 IN | OXYGEN SATURATION: 93 % | BODY MASS INDEX: 29.64 KG/M2 | RESPIRATION RATE: 16 BRPM | DIASTOLIC BLOOD PRESSURE: 82 MMHG | WEIGHT: 173.6 LBS

## 2025-07-17 DIAGNOSIS — I10 ESSENTIAL HYPERTENSION: Primary | ICD-10-CM

## 2025-07-17 DIAGNOSIS — J45.40 MODERATE PERSISTENT REACTIVE AIRWAY DISEASE WITHOUT COMPLICATION: ICD-10-CM

## 2025-07-17 DIAGNOSIS — E21.3 HYPERPARATHYROIDISM (HCC): ICD-10-CM

## 2025-07-17 DIAGNOSIS — E03.9 HYPOTHYROIDISM, UNSPECIFIED TYPE: ICD-10-CM

## 2025-07-17 DIAGNOSIS — M35.05 SJOGREN SYNDROME WITH INFLAMMATORY ARTHRITIS (HCC): ICD-10-CM

## 2025-07-17 DIAGNOSIS — L40.50 PSORIASIS WITH ARTHROPATHY (HCC): ICD-10-CM

## 2025-07-17 DIAGNOSIS — Z12.11 SCREENING FOR COLON CANCER: ICD-10-CM

## 2025-07-17 DIAGNOSIS — Z12.31 ENCOUNTER FOR SCREENING MAMMOGRAM FOR BREAST CANCER: ICD-10-CM

## 2025-07-17 PROCEDURE — 99396 PREV VISIT EST AGE 40-64: CPT | Performed by: FAMILY MEDICINE

## 2025-07-17 PROCEDURE — 99214 OFFICE O/P EST MOD 30 MIN: CPT | Performed by: FAMILY MEDICINE

## 2025-07-17 RX ORDER — METOPROLOL TARTRATE 50 MG
50 TABLET ORAL DAILY
Qty: 30 TABLET | Refills: 0 | Status: SHIPPED | OUTPATIENT
Start: 2025-07-17 | End: 2025-07-17 | Stop reason: SDUPTHER

## 2025-07-17 RX ORDER — PILOCARPINE HYDROCHLORIDE 5 MG/1
5 TABLET, FILM COATED ORAL 3 TIMES DAILY
Qty: 90 TABLET | Refills: 3 | Status: SHIPPED | OUTPATIENT
Start: 2025-07-17

## 2025-07-17 RX ORDER — LEVOTHYROXINE SODIUM 112 UG/1
112 TABLET ORAL DAILY
Qty: 30 TABLET | Refills: 0 | Status: SHIPPED | OUTPATIENT
Start: 2025-07-17 | End: 2025-07-17 | Stop reason: SDUPTHER

## 2025-07-17 RX ORDER — ALPRAZOLAM 0.5 MG
TABLET ORAL
COMMUNITY
Start: 2025-06-25

## 2025-07-17 RX ORDER — METOPROLOL TARTRATE 50 MG
50 TABLET ORAL DAILY
Qty: 30 TABLET | Refills: 0 | Status: SHIPPED | OUTPATIENT
Start: 2025-07-17

## 2025-07-17 RX ORDER — LEVOTHYROXINE SODIUM 112 UG/1
112 TABLET ORAL DAILY
Qty: 30 TABLET | Refills: 0 | Status: SHIPPED | OUTPATIENT
Start: 2025-07-17

## 2025-07-17 NOTE — ASSESSMENT & PLAN NOTE
Last labs done a year ago, has been stable on Levothyroxine 112 mcg. Will refill for one month, give time for her to get labs done, and adjust dose as needed.  Orders:  •  levothyroxine 112 mcg tablet; Take 1 tablet (112 mcg total) by mouth daily

## 2025-07-17 NOTE — ASSESSMENT & PLAN NOTE
Following with Endocrinology, recommended to take Vitamin D, currently taking Vitamin D 50,000 IU every 2 weeks. Recheck Vitamin D now.

## 2025-07-17 NOTE — ASSESSMENT & PLAN NOTE
BP at goal on current regimen, continue Lopressor at current dose.  Orders:  •  metoprolol tartrate (LOPRESSOR) 50 mg tablet; Take 1 tablet (50 mg total) by mouth in the morning

## 2025-07-17 NOTE — ASSESSMENT & PLAN NOTE
Does well with Pilocarpine, continue current regimen.   Orders:  •  pilocarpine (SALAGEN) 5 mg tablet; Take 1 tablet (5 mg total) by mouth 3 (three) times a day

## 2025-07-17 NOTE — PROGRESS NOTES
Adult Annual Physical  Name: Paty Rowe      : 1965      MRN: [id number]  Encounter Provider: Aristeo Cardenas DO  Encounter Date: 2025   Encounter department: [facility]    :  Assessment & Plan  Essential hypertension  BP at goal on current regimen, continue Lopressor at current dose.  Orders:  •  metoprolol tartrate (LOPRESSOR) 50 mg tablet; Take 1 tablet (50 mg total) by mouth in the morning    Hyperparathyroidism (HCC)  Following with Endocrinology, recommended to take Vitamin D, currently taking Vitamin D 50,000 IU every 2 weeks. Recheck Vitamin D now.       Psoriasis with arthropathy (HCC)         Moderate persistent reactive airway disease without complication         Hypothyroidism, unspecified type  Last labs done a year ago, has been stable on Levothyroxine 112 mcg. Will refill for one month, give time for her to get labs done, and adjust dose as needed.  Orders:  •  levothyroxine 112 mcg tablet; Take 1 tablet (112 mcg total) by mouth daily    Sjogren syndrome with inflammatory arthritis (HCC)  Does well with Pilocarpine, continue current regimen.   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       History of Present Illness     Adult Annual Physical:  Patient presents for annual physical.     Diet and Physical Activity:  - Diet/Nutrition: no special diet and well balanced diet.  - Exercise: no formal exercise.  "limited due to low back and hip pain with sciatica.    Depression Screening:    - PHQ-9 Score: 0    General Health:  - Sleep: sleeps well and sleeps poorly.  - Vision: wears glasses.  - Dental: regular dental visits.    Review of Systems      Objective   /82 (BP Location: Left arm, Patient Position: Sitting, Cuff Size: Standard)   Pulse 75   Temp 97.5 °F (36.4 °C) (Temporal)   Resp 16   Ht 5' 4\" (1.626 m)   Wt 78.7 kg (173 lb 9.6 oz)   LMP  (LMP Unknown)   SpO2 93%   BMI 29.80 kg/m²     Physical Exam  Vitals reviewed.   Constitutional:       Appearance: Normal appearance.   HENT:      Right Ear: External ear normal.      Left Ear: External ear normal.      Nose: Nose normal.     Eyes:      Extraocular Movements: Extraocular movements intact.      Conjunctiva/sclera: Conjunctivae normal.       Cardiovascular:      Rate and Rhythm: Normal rate and regular rhythm.      Heart sounds: Normal heart sounds.   Pulmonary:      Effort: Pulmonary effort is normal.      Breath sounds: Normal breath sounds.     Skin:     General: Skin is warm and dry.      Capillary Refill: Capillary refill takes less than 2 seconds.     Neurological:      Mental Status: She is alert.     Psychiatric:         Mood and Affect: Mood normal.         Behavior: Behavior normal.         "

## 2025-07-31 ENCOUNTER — HOSPITAL ENCOUNTER (OUTPATIENT)
Dept: MAMMOGRAPHY | Facility: HOSPITAL | Age: 60
Discharge: HOME | End: 2025-07-31
Payer: COMMERCIAL

## 2025-07-31 VITALS — BODY MASS INDEX: 29.53 KG/M2 | WEIGHT: 173 LBS | HEIGHT: 64 IN

## 2025-07-31 DIAGNOSIS — Z12.31 ENCOUNTER FOR SCREENING MAMMOGRAM FOR BREAST CANCER: ICD-10-CM

## 2025-07-31 PROCEDURE — 77067 SCR MAMMO BI INCL CAD: CPT

## 2025-07-31 PROCEDURE — 77063 BREAST TOMOSYNTHESIS BI: CPT

## 2025-08-05 DIAGNOSIS — L40.50 PSORIASIS WITH ARTHROPATHY (HCC): ICD-10-CM

## 2025-08-06 RX ORDER — MELOXICAM 15 MG/1
TABLET ORAL
Qty: 30 TABLET | Refills: 0 | Status: SHIPPED | OUTPATIENT
Start: 2025-08-06

## 2025-08-18 DIAGNOSIS — I10 ESSENTIAL HYPERTENSION: ICD-10-CM

## 2025-08-20 RX ORDER — METOPROLOL TARTRATE 50 MG
50 TABLET ORAL DAILY
Qty: 30 TABLET | Refills: 5 | Status: SHIPPED | OUTPATIENT
Start: 2025-08-20

## (undated) DEVICE — ACE WRAP 6 IN UNSTERILE

## (undated) DEVICE — INTENDED FOR TISSUE SEPARATION, AND OTHER PROCEDURES THAT REQUIRE A SHARP SURGICAL BLADE TO PUNCTURE OR CUT.: Brand: BARD-PARKER SAFETY BLADES SIZE 15, STERILE

## (undated) DEVICE — NEEDLE 18 G X 1 1/2

## (undated) DEVICE — BETHLEHEM UNIVERSAL  MIONR EXT: Brand: CARDINAL HEALTH

## (undated) DEVICE — BLADE ACL FINE 9.5X25.5X0.4MM

## (undated) DEVICE — CHLORAPREP HI-LITE 26ML ORANGE

## (undated) DEVICE — ACE WRAP 4 IN UNSTERILE

## (undated) DEVICE — SYRINGE 10ML LL

## (undated) DEVICE — PAD GROUNDING ADULT

## (undated) DEVICE — 10FR FRAZIER SUCTION HANDLE: Brand: CARDINAL HEALTH

## (undated) DEVICE — SUT VICRYL 3-0 SH 27 IN J416H

## (undated) DEVICE — SUT MONOCRYL 4-0 PS-2 27 IN Y426H

## (undated) DEVICE — GLOVE INDICATOR PI UNDERGLOVE SZ 7.5 BLUE

## (undated) DEVICE — SPLINT ORTHO-GLASS 4IN X 15FT

## (undated) DEVICE — BANDAGE, ESMARK LF STR 4"X9'(20/CS): Brand: CYPRESS

## (undated) DEVICE — 3M™ STERI-STRIP™ REINFORCED ADHESIVE SKIN CLOSURES, R1546, 1/4 IN X 4 IN (6 MM X 100 MM), 10 STRIPS/ENVELOPE: Brand: 3M™ STERI-STRIP™

## (undated) DEVICE — NEEDLE 25G X 1 1/2

## (undated) DEVICE — STOCKINETTE IMPERVIOUS

## (undated) DEVICE — KERLIX BANDAGE ROLL: Brand: KERLIX

## (undated) DEVICE — Device

## (undated) DEVICE — OCCLUSIVE GAUZE STRIP,3% BISMUTH TRIBROMOPHENATE IN PETROLATUM BLEND: Brand: XEROFORM

## (undated) DEVICE — 3M™ STERI-STRIP™ REINFORCED ADHESIVE SKIN CLOSURES, R1547, 1/2 IN X 4 IN (12 MM X 100 MM), 6 STRIPS/ENVELOPE: Brand: 3M™ STERI-STRIP™

## (undated) DEVICE — CURITY NON-ADHERENT STRIPS: Brand: CURITY

## (undated) DEVICE — SUT VICRYL 4-0 PS-2 27 IN J426H

## (undated) DEVICE — INTENDED FOR TISSUE SEPARATION, AND OTHER PROCEDURES THAT REQUIRE A SHARP SURGICAL BLADE TO PUNCTURE OR CUT.: Brand: BARD-PARKER ® CARBON RIB-BACK BLADES

## (undated) DEVICE — GLOVE SRG BIOGEL 7.5

## (undated) DEVICE — STRETCH BANDAGE: Brand: CURITY

## (undated) DEVICE — COBAN 4 IN STERILE